# Patient Record
Sex: FEMALE | Race: WHITE | NOT HISPANIC OR LATINO | Employment: PART TIME | URBAN - METROPOLITAN AREA
[De-identification: names, ages, dates, MRNs, and addresses within clinical notes are randomized per-mention and may not be internally consistent; named-entity substitution may affect disease eponyms.]

---

## 2019-06-11 ENCOUNTER — OFFICE VISIT CONVERTED (OUTPATIENT)
Dept: OTHER | Facility: HOSPITAL | Age: 69
End: 2019-06-11
Attending: NURSE PRACTITIONER

## 2019-06-11 ENCOUNTER — CONVERSION ENCOUNTER (OUTPATIENT)
Dept: OTHER | Facility: HOSPITAL | Age: 69
End: 2019-06-11

## 2019-06-11 ENCOUNTER — HOSPITAL ENCOUNTER (OUTPATIENT)
Dept: OTHER | Facility: HOSPITAL | Age: 69
Discharge: HOME | End: 2019-06-11
Attending: NURSE PRACTITIONER

## 2019-06-11 LAB
ALBUMIN SERPL-MCNC: 4 G/DL (ref 3.5–5)
ALBUMIN/GLOB SERPL: 1 {RATIO} (ref 1.4–2.6)
ALP SERPL-CCNC: 106 U/L (ref 43–160)
ALT SERPL-CCNC: 30 U/L (ref 10–40)
ANION GAP SERPL CALC-SCNC: 19 MMOL/L (ref 8–19)
AST SERPL-CCNC: 32 U/L (ref 15–50)
BASOPHILS # BLD AUTO: 0.04 10*3/UL (ref 0–0.2)
BASOPHILS NFR BLD AUTO: 0.7 % (ref 0–3)
BILIRUB SERPL-MCNC: 0.34 MG/DL (ref 0.2–1.3)
BUN SERPL-MCNC: 20 MG/DL (ref 5–25)
BUN/CREAT SERPL: 22 {RATIO} (ref 6–20)
CALCIUM SERPL-MCNC: 9.1 MG/DL (ref 8.7–10.4)
CHLORIDE SERPL-SCNC: 104 MMOL/L (ref 99–111)
CHOLEST SERPL-MCNC: 167 MG/DL (ref 107–200)
CHOLEST/HDLC SERPL: 3.3 {RATIO} (ref 3–6)
CONV ABS IMM GRAN: 0.01 10*3/UL (ref 0–0.2)
CONV CO2: 21 MMOL/L (ref 22–32)
CONV CREATININE URINE, RANDOM: 171.6 MG/DL (ref 10–300)
CONV IMMATURE GRAN: 0.2 % (ref 0–1.8)
CONV MICROALBUM.,U,RANDOM: 26.6 MG/L (ref 0–20)
CONV TOTAL PROTEIN: 8 G/DL (ref 6.3–8.2)
CREAT UR-MCNC: 0.92 MG/DL (ref 0.5–0.9)
DEPRECATED RDW RBC AUTO: 45.9 FL (ref 36.4–46.3)
EOSINOPHIL # BLD AUTO: 0.08 10*3/UL (ref 0–0.7)
EOSINOPHIL # BLD AUTO: 1.3 % (ref 0–7)
ERYTHROCYTE [DISTWIDTH] IN BLOOD BY AUTOMATED COUNT: 12.8 % (ref 11.7–14.4)
EST. AVERAGE GLUCOSE BLD GHB EST-MCNC: 186 MG/DL
GFR SERPLBLD BASED ON 1.73 SQ M-ARVRAT: >60 ML/MIN/{1.73_M2}
GLOBULIN UR ELPH-MCNC: 4 G/DL (ref 2–3.5)
GLUCOSE SERPL-MCNC: 169 MG/DL (ref 65–99)
HBA1C MFR BLD: 14.8 G/DL (ref 12–16)
HBA1C MFR BLD: 8.1 % (ref 3.5–5.7)
HCT VFR BLD AUTO: 45.8 % (ref 37–47)
HDLC SERPL-MCNC: 50 MG/DL (ref 40–60)
LDLC SERPL CALC-MCNC: 90 MG/DL (ref 70–100)
LYMPHOCYTES # BLD AUTO: 2.3 10*3/UL (ref 1–5)
MCH RBC QN AUTO: 31.2 PG (ref 27–31)
MCHC RBC AUTO-ENTMCNC: 32.3 G/DL (ref 33–37)
MCV RBC AUTO: 96.6 FL (ref 81–99)
MICROALBUMIN/CREAT UR: 15.5 MG/G{CRE} (ref 0–35)
MONOCYTES # BLD AUTO: 0.39 10*3/UL (ref 0.2–1.2)
MONOCYTES NFR BLD AUTO: 6.4 % (ref 3–10)
NEUTROPHILS # BLD AUTO: 3.23 10*3/UL (ref 2–8)
NEUTROPHILS NFR BLD AUTO: 53.4 % (ref 30–85)
NRBC CBCN: 0 % (ref 0–0.7)
OSMOLALITY SERPL CALC.SUM OF ELEC: 295 MOSM/KG (ref 273–304)
PLATELET # BLD AUTO: 177 10*3/UL (ref 130–400)
PMV BLD AUTO: 12.2 FL (ref 9.4–12.3)
POTASSIUM SERPL-SCNC: 4.6 MMOL/L (ref 3.5–5.3)
RBC # BLD AUTO: 4.74 10*6/UL (ref 4.2–5.4)
SODIUM SERPL-SCNC: 139 MMOL/L (ref 135–147)
T4 FREE SERPL-MCNC: 1.2 NG/DL (ref 0.9–1.8)
TRIGL SERPL-MCNC: 134 MG/DL (ref 40–150)
TSH SERPL-ACNC: 2.56 M[IU]/L (ref 0.27–4.2)
VARIANT LYMPHS NFR BLD MANUAL: 38 % (ref 20–45)
VLDLC SERPL-MCNC: 27 MG/DL (ref 5–37)
WBC # BLD AUTO: 6.05 10*3/UL (ref 4.8–10.8)

## 2019-09-10 ENCOUNTER — OFFICE VISIT CONVERTED (OUTPATIENT)
Dept: OTHER | Facility: HOSPITAL | Age: 69
End: 2019-09-10
Attending: NURSE PRACTITIONER

## 2019-09-10 ENCOUNTER — HOSPITAL ENCOUNTER (OUTPATIENT)
Dept: OTHER | Facility: HOSPITAL | Age: 69
Discharge: HOME | End: 2019-09-10
Attending: NURSE PRACTITIONER

## 2019-09-10 ENCOUNTER — CONVERSION ENCOUNTER (OUTPATIENT)
Dept: OTHER | Facility: HOSPITAL | Age: 69
End: 2019-09-10

## 2019-09-10 LAB
ALBUMIN SERPL-MCNC: 3.8 G/DL (ref 3.5–5)
ALBUMIN/GLOB SERPL: 1.1 {RATIO} (ref 1.4–2.6)
ALP SERPL-CCNC: 94 U/L (ref 43–160)
ALT SERPL-CCNC: 29 U/L (ref 10–40)
ANION GAP SERPL CALC-SCNC: 19 MMOL/L (ref 8–19)
AST SERPL-CCNC: 31 U/L (ref 15–50)
BILIRUB SERPL-MCNC: 0.37 MG/DL (ref 0.2–1.3)
BUN SERPL-MCNC: 15 MG/DL (ref 5–25)
BUN/CREAT SERPL: 19 {RATIO} (ref 6–20)
CALCIUM SERPL-MCNC: 9.2 MG/DL (ref 8.7–10.4)
CHLORIDE SERPL-SCNC: 105 MMOL/L (ref 99–111)
CONV CO2: 21 MMOL/L (ref 22–32)
CONV TOTAL PROTEIN: 7.3 G/DL (ref 6.3–8.2)
CREAT UR-MCNC: 0.79 MG/DL (ref 0.5–0.9)
EST. AVERAGE GLUCOSE BLD GHB EST-MCNC: 194 MG/DL
GFR SERPLBLD BASED ON 1.73 SQ M-ARVRAT: >60 ML/MIN/{1.73_M2}
GLOBULIN UR ELPH-MCNC: 3.5 G/DL (ref 2–3.5)
GLUCOSE SERPL-MCNC: 177 MG/DL (ref 65–99)
HBA1C MFR BLD: 8.4 % (ref 3.5–5.7)
OSMOLALITY SERPL CALC.SUM OF ELEC: 295 MOSM/KG (ref 273–304)
POTASSIUM SERPL-SCNC: 4.7 MMOL/L (ref 3.5–5.3)
SODIUM SERPL-SCNC: 140 MMOL/L (ref 135–147)

## 2019-12-10 ENCOUNTER — OFFICE VISIT CONVERTED (OUTPATIENT)
Dept: OTHER | Facility: HOSPITAL | Age: 69
End: 2019-12-10
Attending: NURSE PRACTITIONER

## 2019-12-10 ENCOUNTER — HOSPITAL ENCOUNTER (OUTPATIENT)
Dept: OTHER | Facility: HOSPITAL | Age: 69
Discharge: HOME | End: 2019-12-10
Attending: NURSE PRACTITIONER

## 2019-12-10 LAB
ALBUMIN SERPL-MCNC: 3.6 G/DL (ref 3.5–5)
ALBUMIN/GLOB SERPL: 0.9 {RATIO} (ref 1.4–2.6)
ALP SERPL-CCNC: 105 U/L (ref 43–160)
ALT SERPL-CCNC: 34 U/L (ref 10–40)
ANION GAP SERPL CALC-SCNC: 18 MMOL/L (ref 8–19)
AST SERPL-CCNC: 33 U/L (ref 15–50)
BILIRUB SERPL-MCNC: 0.34 MG/DL (ref 0.2–1.3)
BUN SERPL-MCNC: 18 MG/DL (ref 5–25)
BUN/CREAT SERPL: 20 {RATIO} (ref 6–20)
CALCIUM SERPL-MCNC: 9.3 MG/DL (ref 8.7–10.4)
CHLORIDE SERPL-SCNC: 105 MMOL/L (ref 99–111)
CHOLEST SERPL-MCNC: 160 MG/DL (ref 107–200)
CHOLEST/HDLC SERPL: 3.6 {RATIO} (ref 3–6)
CONV CO2: 21 MMOL/L (ref 22–32)
CONV CREATININE URINE, RANDOM: 158.1 MG/DL (ref 10–300)
CONV MICROALBUM.,U,RANDOM: 12.2 MG/L (ref 0–20)
CONV TOTAL PROTEIN: 7.4 G/DL (ref 6.3–8.2)
CREAT UR-MCNC: 0.89 MG/DL (ref 0.5–0.9)
EST. AVERAGE GLUCOSE BLD GHB EST-MCNC: 189 MG/DL
GFR SERPLBLD BASED ON 1.73 SQ M-ARVRAT: >60 ML/MIN/{1.73_M2}
GLOBULIN UR ELPH-MCNC: 3.8 G/DL (ref 2–3.5)
GLUCOSE SERPL-MCNC: 181 MG/DL (ref 65–99)
HBA1C MFR BLD: 8.2 % (ref 3.5–5.7)
HDLC SERPL-MCNC: 45 MG/DL (ref 40–60)
LDLC SERPL CALC-MCNC: 93 MG/DL (ref 70–100)
MICROALBUMIN/CREAT UR: 7.7 MG/G{CRE} (ref 0–35)
OSMOLALITY SERPL CALC.SUM OF ELEC: 296 MOSM/KG (ref 273–304)
POTASSIUM SERPL-SCNC: 4.2 MMOL/L (ref 3.5–5.3)
SODIUM SERPL-SCNC: 140 MMOL/L (ref 135–147)
TRIGL SERPL-MCNC: 112 MG/DL (ref 40–150)
VLDLC SERPL-MCNC: 22 MG/DL (ref 5–37)

## 2020-03-10 ENCOUNTER — CONVERSION ENCOUNTER (OUTPATIENT)
Dept: FAMILY MEDICINE CLINIC | Facility: CLINIC | Age: 70
End: 2020-03-10

## 2020-03-10 ENCOUNTER — HOSPITAL ENCOUNTER (OUTPATIENT)
Dept: FAMILY MEDICINE CLINIC | Facility: CLINIC | Age: 70
Discharge: HOME | End: 2020-03-10
Attending: NURSE PRACTITIONER

## 2020-03-10 ENCOUNTER — OFFICE VISIT CONVERTED (OUTPATIENT)
Dept: FAMILY MEDICINE CLINIC | Facility: CLINIC | Age: 70
End: 2020-03-10
Attending: NURSE PRACTITIONER

## 2020-03-10 LAB
ALBUMIN SERPL-MCNC: 3.9 G/DL (ref 3.5–5)
ALBUMIN/GLOB SERPL: 1.1 {RATIO} (ref 1.4–2.6)
ALP SERPL-CCNC: 84 U/L (ref 43–160)
ALT SERPL-CCNC: 32 U/L (ref 10–40)
ANION GAP SERPL CALC-SCNC: 16 MMOL/L (ref 8–19)
AST SERPL-CCNC: 30 U/L (ref 15–50)
BILIRUB SERPL-MCNC: 0.38 MG/DL (ref 0.2–1.3)
BUN SERPL-MCNC: 19 MG/DL (ref 5–25)
BUN/CREAT SERPL: 23 {RATIO} (ref 6–20)
CALCIUM SERPL-MCNC: 9.2 MG/DL (ref 8.7–10.4)
CHLORIDE SERPL-SCNC: 104 MMOL/L (ref 99–111)
CHOLEST SERPL-MCNC: 171 MG/DL (ref 107–200)
CHOLEST/HDLC SERPL: 3.9 {RATIO} (ref 3–6)
CONV CO2: 23 MMOL/L (ref 22–32)
CONV TOTAL PROTEIN: 7.4 G/DL (ref 6.3–8.2)
CREAT UR-MCNC: 0.84 MG/DL (ref 0.5–0.9)
EST. AVERAGE GLUCOSE BLD GHB EST-MCNC: 192 MG/DL
GFR SERPLBLD BASED ON 1.73 SQ M-ARVRAT: >60 ML/MIN/{1.73_M2}
GLOBULIN UR ELPH-MCNC: 3.5 G/DL (ref 2–3.5)
GLUCOSE SERPL-MCNC: 167 MG/DL (ref 65–99)
HBA1C MFR BLD: 8.3 % (ref 3.5–5.7)
HDLC SERPL-MCNC: 44 MG/DL (ref 40–60)
LDLC SERPL CALC-MCNC: 98 MG/DL (ref 70–100)
OSMOLALITY SERPL CALC.SUM OF ELEC: 292 MOSM/KG (ref 273–304)
POTASSIUM SERPL-SCNC: 4.5 MMOL/L (ref 3.5–5.3)
SODIUM SERPL-SCNC: 138 MMOL/L (ref 135–147)
TRIGL SERPL-MCNC: 144 MG/DL (ref 40–150)
VLDLC SERPL-MCNC: 29 MG/DL (ref 5–37)

## 2020-06-10 ENCOUNTER — CONVERSION ENCOUNTER (OUTPATIENT)
Dept: FAMILY MEDICINE CLINIC | Facility: CLINIC | Age: 70
End: 2020-06-10

## 2020-06-10 ENCOUNTER — OFFICE VISIT CONVERTED (OUTPATIENT)
Dept: FAMILY MEDICINE CLINIC | Facility: CLINIC | Age: 70
End: 2020-06-10
Attending: NURSE PRACTITIONER

## 2020-06-10 ENCOUNTER — HOSPITAL ENCOUNTER (OUTPATIENT)
Dept: FAMILY MEDICINE CLINIC | Facility: CLINIC | Age: 70
Discharge: HOME | End: 2020-06-10
Attending: NURSE PRACTITIONER

## 2020-06-10 LAB
ALBUMIN SERPL-MCNC: 3.9 G/DL (ref 3.5–5)
ALBUMIN/GLOB SERPL: 1.1 {RATIO} (ref 1.4–2.6)
ALP SERPL-CCNC: 91 U/L (ref 43–160)
ALT SERPL-CCNC: 33 U/L (ref 10–40)
ANION GAP SERPL CALC-SCNC: 15 MMOL/L (ref 8–19)
AST SERPL-CCNC: 38 U/L (ref 15–50)
BASOPHILS # BLD AUTO: 0.04 10*3/UL (ref 0–0.2)
BASOPHILS NFR BLD AUTO: 0.7 % (ref 0–3)
BILIRUB SERPL-MCNC: 0.4 MG/DL (ref 0.2–1.3)
BUN SERPL-MCNC: 17 MG/DL (ref 5–25)
BUN/CREAT SERPL: 20 {RATIO} (ref 6–20)
CALCIUM SERPL-MCNC: 9.1 MG/DL (ref 8.7–10.4)
CHLORIDE SERPL-SCNC: 104 MMOL/L (ref 99–111)
CHOLEST SERPL-MCNC: 166 MG/DL (ref 107–200)
CHOLEST/HDLC SERPL: 4 {RATIO} (ref 3–6)
CONV ABS IMM GRAN: 0.02 10*3/UL (ref 0–0.2)
CONV CO2: 22 MMOL/L (ref 22–32)
CONV CREATININE URINE, RANDOM: 180.9 MG/DL (ref 10–300)
CONV IMMATURE GRAN: 0.3 % (ref 0–1.8)
CONV MICROALBUM.,U,RANDOM: <12 MG/L (ref 0–20)
CONV TOTAL PROTEIN: 7.6 G/DL (ref 6.3–8.2)
CREAT UR-MCNC: 0.83 MG/DL (ref 0.5–0.9)
DEPRECATED RDW RBC AUTO: 46.4 FL (ref 36.4–46.3)
EOSINOPHIL # BLD AUTO: 0.11 10*3/UL (ref 0–0.7)
EOSINOPHIL # BLD AUTO: 1.8 % (ref 0–7)
ERYTHROCYTE [DISTWIDTH] IN BLOOD BY AUTOMATED COUNT: 12.7 % (ref 11.7–14.4)
EST. AVERAGE GLUCOSE BLD GHB EST-MCNC: 197 MG/DL
FOLATE SERPL-MCNC: 13.2 NG/ML (ref 4.8–20)
GFR SERPLBLD BASED ON 1.73 SQ M-ARVRAT: >60 ML/MIN/{1.73_M2}
GLOBULIN UR ELPH-MCNC: 3.7 G/DL (ref 2–3.5)
GLUCOSE SERPL-MCNC: 150 MG/DL (ref 65–99)
HBA1C MFR BLD: 8.5 % (ref 3.5–5.7)
HCT VFR BLD AUTO: 46.5 % (ref 37–47)
HDLC SERPL-MCNC: 42 MG/DL (ref 40–60)
HGB BLD-MCNC: 15.2 G/DL (ref 12–16)
LDLC SERPL CALC-MCNC: 96 MG/DL (ref 70–100)
LYMPHOCYTES # BLD AUTO: 2.39 10*3/UL (ref 1–5)
LYMPHOCYTES NFR BLD AUTO: 40.1 % (ref 20–45)
MCH RBC QN AUTO: 31.7 PG (ref 27–31)
MCHC RBC AUTO-ENTMCNC: 32.7 G/DL (ref 33–37)
MCV RBC AUTO: 96.9 FL (ref 81–99)
MICROALBUMIN/CREAT UR: 6.6 MG/G{CRE} (ref 0–35)
MONOCYTES # BLD AUTO: 0.39 10*3/UL (ref 0.2–1.2)
MONOCYTES NFR BLD AUTO: 6.5 % (ref 3–10)
NEUTROPHILS # BLD AUTO: 3.01 10*3/UL (ref 2–8)
NEUTROPHILS NFR BLD AUTO: 50.6 % (ref 30–85)
NRBC CBCN: 0 % (ref 0–0.7)
OSMOLALITY SERPL CALC.SUM OF ELEC: 286 MOSM/KG (ref 273–304)
PLATELET # BLD AUTO: 187 10*3/UL (ref 130–400)
PMV BLD AUTO: 11.8 FL (ref 9.4–12.3)
POTASSIUM SERPL-SCNC: 4.7 MMOL/L (ref 3.5–5.3)
RBC # BLD AUTO: 4.8 10*6/UL (ref 4.2–5.4)
SODIUM SERPL-SCNC: 136 MMOL/L (ref 135–147)
T4 FREE SERPL-MCNC: 1.1 NG/DL (ref 0.9–1.8)
TRIGL SERPL-MCNC: 141 MG/DL (ref 40–150)
TSH SERPL-ACNC: 1.7 M[IU]/L (ref 0.27–4.2)
VIT B12 SERPL-MCNC: 362 PG/ML (ref 211–911)
VLDLC SERPL-MCNC: 28 MG/DL (ref 5–37)
WBC # BLD AUTO: 5.96 10*3/UL (ref 4.8–10.8)

## 2020-09-09 ENCOUNTER — CONVERSION ENCOUNTER (OUTPATIENT)
Dept: FAMILY MEDICINE CLINIC | Facility: CLINIC | Age: 70
End: 2020-09-09

## 2020-09-09 ENCOUNTER — OFFICE VISIT CONVERTED (OUTPATIENT)
Dept: FAMILY MEDICINE CLINIC | Facility: CLINIC | Age: 70
End: 2020-09-09
Attending: NURSE PRACTITIONER

## 2020-09-09 ENCOUNTER — HOSPITAL ENCOUNTER (OUTPATIENT)
Dept: FAMILY MEDICINE CLINIC | Facility: CLINIC | Age: 70
Discharge: HOME | End: 2020-09-09
Attending: NURSE PRACTITIONER

## 2020-09-10 LAB
25(OH)D3 SERPL-MCNC: 12.8 NG/ML (ref 30–100)
ALBUMIN SERPL-MCNC: 3.8 G/DL (ref 3.5–5)
ALBUMIN/GLOB SERPL: 1 {RATIO} (ref 1.4–2.6)
ALP SERPL-CCNC: 94 U/L (ref 43–160)
ALT SERPL-CCNC: 28 U/L (ref 10–40)
ANION GAP SERPL CALC-SCNC: 16 MMOL/L (ref 8–19)
AST SERPL-CCNC: 34 U/L (ref 15–50)
BILIRUB SERPL-MCNC: 0.39 MG/DL (ref 0.2–1.3)
BUN SERPL-MCNC: 16 MG/DL (ref 5–25)
BUN/CREAT SERPL: 16 {RATIO} (ref 6–20)
CALCIUM SERPL-MCNC: 9.3 MG/DL (ref 8.7–10.4)
CHLORIDE SERPL-SCNC: 105 MMOL/L (ref 99–111)
CHOLEST SERPL-MCNC: 170 MG/DL (ref 107–200)
CHOLEST/HDLC SERPL: 4 {RATIO} (ref 3–6)
CONV CO2: 23 MMOL/L (ref 22–32)
CONV TOTAL PROTEIN: 7.5 G/DL (ref 6.3–8.2)
CREAT UR-MCNC: 0.97 MG/DL (ref 0.5–0.9)
EST. AVERAGE GLUCOSE BLD GHB EST-MCNC: 189 MG/DL
FOLATE SERPL-MCNC: 11.4 NG/ML (ref 4.8–20)
GFR SERPLBLD BASED ON 1.73 SQ M-ARVRAT: 58 ML/MIN/{1.73_M2}
GLOBULIN UR ELPH-MCNC: 3.7 G/DL (ref 2–3.5)
GLUCOSE SERPL-MCNC: 142 MG/DL (ref 65–99)
HBA1C MFR BLD: 8.2 % (ref 3.5–5.7)
HDLC SERPL-MCNC: 42 MG/DL (ref 40–60)
LDLC SERPL CALC-MCNC: 102 MG/DL (ref 70–100)
OSMOLALITY SERPL CALC.SUM OF ELEC: 292 MOSM/KG (ref 273–304)
POTASSIUM SERPL-SCNC: 4.6 MMOL/L (ref 3.5–5.3)
SODIUM SERPL-SCNC: 139 MMOL/L (ref 135–147)
TRIGL SERPL-MCNC: 130 MG/DL (ref 40–150)
VIT B12 SERPL-MCNC: 842 PG/ML (ref 211–911)
VLDLC SERPL-MCNC: 26 MG/DL (ref 5–37)

## 2020-09-25 ENCOUNTER — OFFICE VISIT CONVERTED (OUTPATIENT)
Dept: SURGERY | Facility: CLINIC | Age: 70
End: 2020-09-25
Attending: NURSE PRACTITIONER

## 2020-12-09 ENCOUNTER — HOSPITAL ENCOUNTER (OUTPATIENT)
Dept: MAMMOGRAPHY | Facility: HOSPITAL | Age: 70
Discharge: HOME | End: 2020-12-09
Attending: NURSE PRACTITIONER

## 2020-12-09 ENCOUNTER — TELEMEDICINE CONVERTED (OUTPATIENT)
Dept: FAMILY MEDICINE CLINIC | Facility: CLINIC | Age: 70
End: 2020-12-09
Attending: NURSE PRACTITIONER

## 2021-03-02 ENCOUNTER — HOSPITAL ENCOUNTER (OUTPATIENT)
Dept: FAMILY MEDICINE CLINIC | Facility: CLINIC | Age: 71
Discharge: HOME | End: 2021-03-02
Attending: NURSE PRACTITIONER

## 2021-03-02 ENCOUNTER — OFFICE VISIT CONVERTED (OUTPATIENT)
Dept: FAMILY MEDICINE CLINIC | Facility: CLINIC | Age: 71
End: 2021-03-02
Attending: NURSE PRACTITIONER

## 2021-03-02 ENCOUNTER — CONVERSION ENCOUNTER (OUTPATIENT)
Dept: FAMILY MEDICINE CLINIC | Facility: CLINIC | Age: 71
End: 2021-03-02

## 2021-03-02 LAB
ALBUMIN SERPL-MCNC: 3.7 G/DL (ref 3.5–5)
ALBUMIN/GLOB SERPL: 1.1 {RATIO} (ref 1.4–2.6)
ALP SERPL-CCNC: 82 U/L (ref 43–160)
ALT SERPL-CCNC: 26 U/L (ref 10–40)
ANION GAP SERPL CALC-SCNC: 15 MMOL/L (ref 8–19)
AST SERPL-CCNC: 30 U/L (ref 15–50)
BILIRUB SERPL-MCNC: 0.38 MG/DL (ref 0.2–1.3)
BUN SERPL-MCNC: 17 MG/DL (ref 5–25)
BUN/CREAT SERPL: 15 {RATIO} (ref 6–20)
CALCIUM SERPL-MCNC: 8.9 MG/DL (ref 8.7–10.4)
CHLORIDE SERPL-SCNC: 103 MMOL/L (ref 99–111)
CHOLEST SERPL-MCNC: 113 MG/DL (ref 107–200)
CHOLEST/HDLC SERPL: 2.5 {RATIO} (ref 3–6)
CONV CO2: 23 MMOL/L (ref 22–32)
CONV TOTAL PROTEIN: 7 G/DL (ref 6.3–8.2)
CREAT UR-MCNC: 1.14 MG/DL (ref 0.5–0.9)
EST. AVERAGE GLUCOSE BLD GHB EST-MCNC: 212 MG/DL
GFR SERPLBLD BASED ON 1.73 SQ M-ARVRAT: 48 ML/MIN/{1.73_M2}
GLOBULIN UR ELPH-MCNC: 3.3 G/DL (ref 2–3.5)
GLUCOSE SERPL-MCNC: 236 MG/DL (ref 65–99)
HBA1C MFR BLD: 9 % (ref 3.5–5.7)
HDLC SERPL-MCNC: 45 MG/DL (ref 40–60)
LDLC SERPL CALC-MCNC: 41 MG/DL (ref 70–100)
OSMOLALITY SERPL CALC.SUM OF ELEC: 293 MOSM/KG (ref 273–304)
POTASSIUM SERPL-SCNC: 4.1 MMOL/L (ref 3.5–5.3)
SODIUM SERPL-SCNC: 137 MMOL/L (ref 135–147)
TRIGL SERPL-MCNC: 133 MG/DL (ref 40–150)
VLDLC SERPL-MCNC: 27 MG/DL (ref 5–37)

## 2021-03-03 LAB — 25(OH)D3 SERPL-MCNC: 22.9 NG/ML (ref 30–100)

## 2021-05-10 NOTE — H&P
History and Physical      Patient Name: Jane Huang   Patient ID: [id number]   Sex: Female   YOB: 1950    Primary Care Provider: Chrissy HOOKER   Referring Provider: Chrissy HOOKER    Visit Date: 2020    Provider: NHUNG Jain   Location: [facility]   Location Address: [address] [place] [address]  [id number]   Location Phone: [phone]          Chief Complaint  · Requesting colonoscopy  · Age 50 or over  · FH of colon cancer  · Here today for a pre-surgical colon screening visit     Hx of sigmoid resection       History Of Present Illness  The patient is a 70 year old /White female presenting to the Surgical Specialist office on a referral from Chrissy HOOKER.   Jane Huang needs to have a screening colonoscopy.   Patient states that they have had a colonoscopy. 7 years ago   Patient currently complains of: no complaints and   Patient Does have family history of colon cancer. Sibling      Patient presents today without complaints for screening colonoscopy.  She denies any abdominal pain, change in bowel habit or rectal bleeding.  She admits to family history of colon cancer with her sister that is  related to colon cancer.    Patient had a sigmoid colon resection on 3/06 related to a sigmoid colon stricture.    Patient has stage II CKD. Miralax prep.    3/13: Colonoscopy (Pankaj): Diverticulosis.       Past Medical History  Disease Name Date Onset Notes   Allergic rhinitis, chronic --  --    Allergies --  --    Arthritis --  --    Arthritis --  --    Broken Bones --  --    Cataracts, bilateral --  --    Colitis --  --    Depression 2020 --    Diabetes --  --    Diabetes mellitus, type 2 2019 --    Diverticulitis --  --    Essential hypertension 2019 --    Essential tremor 2019 --    Forgetfulness --  --    Gall stones --  --    GERD --  --    High blood pressure --  --   "  HTN (hypertension), benign --  --    Insomnia, unspecified 09/09/2020 --    Kidney stones --  --    Reflux --  --    Screening breast examination --  --    Screening for colon cancer 3-18-13 --    Sinus trouble --  --    Vitamin B12 deficiency 09/09/2020 --          Past Surgical History  Procedure Name Date Notes   *Metal Implant --  --    Appendectomy --  --    Cholecstectomy --  --    Colon --  --    Colon Resection --  --    Colonoscopy 3-18-13 --    Gallbladder --  --    Hysterectomy --  --    Hysterectomy-Abdominal --  --    Joint Surgery --  --    Rotator Cuff repair --  --          Medication List  Name Date Started Instructions   amlodipine 5 mg oral tablet 09/09/2020 take 1 tablet (5 mg) by oral route once daily for 90 days   atorvastatin 10 mg oral tablet 09/10/2020 take 1 tablet (10 mg) by oral route once daily at bedtime for 30 days   baclofen 10 mg oral tablet  take 1 tablet by oral route daily   BD Ultra-Fine Short Pen Needle 31 gauge x 5/16\" miscellaneous needle 12/10/2019 use as directed for 90 days   benazepril 40 mg oral tablet 09/09/2020 take 1 tablet (40 mg) by oral route once daily for 90 days   cetirizine 10 mg oral tablet  take 1 tablet (10 mg) by oral route once daily   colestipol 1 gram oral tablet 06/10/2020 take 1 tablet (1 gram) by oral route 2 times per day swallowing whole with any liquid. Do not crush, chew and/or divide. for 90 days   gabapentin 300 mg oral capsule  take 1 capsule by oral route 2 times a day   glipizide 5 mg oral tablet 09/09/2020 take 2 tablet (5 mg) by oral route in the morning and take 1 tablet in the evening   hydrocodone-acetaminophen  mg oral tablet  take 1 tablet by oral route every 12 hours as needed for pain   Lantus Solostar U-100 Insulin 100 unit/mL (3 mL) subcutaneous insulin pen 06/10/2020 inject 65 units by subcutaneous route once daily   trazodone 50 mg oral tablet 09/09/2020 take 1/2 to 1 tablet by oral route once daily at bedtime for 30 days " "  Vitamin B-12 1,000 mcg oral tablet 09/09/2020 take 1 tablet by oral route daily for 90 days   Vitamin D2 1,250 mcg (50,000 unit) oral capsule 09/10/2020 take 1 capsule by oral route once a week for 90 days         Allergy List  Allergen Name Date Reaction Notes   PENICILLINS --  --  --        Allergies Reconciled  Family Medical History  Disease Name Relative/Age Notes   Chronic Obstructive Pulmonary Disease  --    Breast Neoplasm, Malignant  --    Stomach Neoplasm, Malignant  --    Stroke  --    Heart Disease  --    Cancer, Unspecified  thyroid   Diabetes, unspecified type Brother/  Mother/  Sister/   Mother; Brother; Sister; Child   Prostate cancer Brother/   Brother   Renal Calculus  Child   Family history of colon cancer Sister/70s   Sister/70s   Diabetes  --          Social History  Finding Status Start/Stop Quantity Notes   Alcohol Never --/-- --  09/25/2020 -    Recreational Drug Use Never --/-- --  --    Tobacco Never --/-- --  --          Review of Systems  · Constitutional  o Denies  o : fever, chills  · Eyes  o Denies  o : yellowish discoloration of eyes  · HENT  o Denies  o : difficulty swallowing  · Cardiovascular  o Denies  o : chest pain, chest pain on exertion  · Respiratory  o Denies  o : shortness of breath  · Gastrointestinal  o Denies  o : nausea, vomiting, diarrhea, constipation  · Genitourinary  o Denies  o : abnormal color of urine  · Integument  o Denies  o : rash  · Neurologic  o Denies  o : tingling or numbness  · Musculoskeletal  o Denies  o : joint pain  · Endocrine  o Denies  o : weight gain, weight loss      Vitals  Date Time BP Position Site L\R Cuff Size HR RR TEMP (F) WT  HT  BMI kg/m2 BSA m2 O2 Sat FR L/min FiO2 HC       09/25/2020 10:17 AM         237lbs 8oz 5'  3\" 42.07 2.19             Physical Examination  · Constitutional  o Appearance  o : well developed, well-nourished, patient in no apparent distress  · Head and Face  o Head  o :   § Inspection  § : atraumatic, " normocephalic  o Face  o :   § Inspection  § : no facial lesions  · Eyes  o Conjunctivae  o : conjunctivae normal  o Sclerae  o : sclerae white  · Neck  o Inspection/Palpation  o : normal appearance, no masses or tenderness, trachea midline  · Respiratory  o Respiratory Effort  o : breathing unlabored  · Skin and Subcutaneous Tissue  o General Inspection  o : no lesions present, no areas of discoloration, skin turgor normal, texture normal  · Neurologic  o Mental Status Examination  o :   § Orientation  § : grossly oriented to person, place and time  § Attention  § : attention normal, concentration abilities normal  § Fund of Knowledge  § : fund of knowledge within normal limits, patient aware of current events  o Gait and Station  o : normal gait, able to stand without difficulty  · Psychiatric  o Judgement and Insight  o : judgment and insight intact  o Mood and Affect  o : mood normal, affect appropriate              Assessment  · Family History of Colon Cancer     V16.0/Z80.0  · Screening for colon cancer     V76.51/Z12.11  · Pre-op testing     V72.84/Z01.818  · History of colon resection     V45.89/Z90.49    Problems Reconciled  Plan  · Orders  o Consent for Colonoscopy Screening-Possible risk/complications, benefits, and alternatives to surgical or invasive procedure have been explained to patient and/or legal guardian. -Patient has been evaluated and can tolerate anesthesia and/or sedation. Risks, benefits, and alternatives to anesthesia and sedation have been explained to patient and/or legal guardian. () - V72.84/Z01.818, V76.51/Z12.11, V45.89/Z90.49, V16.0/Z80.0 - 01/08/2021  o [facility] Pre-Op Covid-19 Screening ([address]) - V72.84/Z01.818, V76.51/Z12.11, V45.89/Z90.49, V16.0/Z80.0 - 01/03/2021   [address] @ 8:00am  · Medications  o Medications have been Reconciled  o Transition of Care or Provider Policy  · Instructions  o Surgical Facility: [facility]  o Handouts Provided  Pre-Procedure Instructions including date, time, and location of procedure.   o PLAN: Proceeed with colonoscopy. Patient understands risks/benefits and is willing to proceed.   o ***Surgical Orders***  o RISK AND BENEFITS:  o Given these options, the patient has verbally expressed an understanding of the risks of the surgery and finds these risks acceptable. Will proceed with surgery as soon as possible.  o O.R. PREP: Per protocol   o IV: Per Anesthesia  o Please sign permit for: Colonoscopy with possible biopsies by Dr. Lira.  o The above History and Physical Examination has been completed within 30 days of admission.  o ***Patient Status***  o Outpatient  o Follow up in the in the office post procedure.  o Advised patient she would need COVID-19 testing prior to procedure. Encourage patient self isolate in between testing and procedure. Patient verbalizes understands willing to proceed.  o Electronically Identified Patient Education Materials Provided Electronically            Electronically Signed by: NHUNG Jain -Author on October 7, 2020 01:50:57 PM

## 2021-05-13 NOTE — PROGRESS NOTES
Progress Note      Patient Name: Jane Huang   Patient ID: [id number]   Sex: Female   YOB: 1950    Primary Care Provider: Chrissy HOOKER    Visit Date: September 9, 2020    Provider: NHUNG Toledo   Location: [facility]   Location Address: [address] [place] [address], [address] [place] [address]  [id number]   Location Phone: [phone]          Chief Complaint  · 3 month follow up      History Of Present Illness  Jane Huang is a 70 year old /White female who presents for evaluation and treatment of:      3-month follow-up and med refills.    Diabetes type 2, insulin-dependent: Her last A1c was 8.5% and we increased her glipizide dose at that time.  She is currently on Lantus 65 units daily and glipizide 5 mg 2 tablets in the morning and 1 tablet in the evening.  She states her blood sugars have improved since the increase in glipizide dose but she states her blood sugar was 182 this morning.    Hypertension: Blood pressure is stable 126/80 on benazepril 40 mg and amlodipine 5 mg daily.    Vitamin B12 deficiency: Her vitamin B12 level was low normal previously was 362.  She was started on vitamin B12 oral tabs.  She states she does feel some better.    PHQ 9 score 16 today, she admits that she is somewhat depressed.  She complains of difficulty falling asleep at night.  She states she has difficulty shutting her mind off sometimes when she goes to bed.  She states her depression is mostly due to being isolated due to the COVID-19 pandemic.  No suicidal or homicidal ideation.    She is due for mammogram and does not think she is ever had a bone density scan.    She states that [facility] has been calling her and telling her she needs to have a colorectal screening.  Her last one was in 2013.  She does have a family history of colon cancer.       Past Medical History  Disease Name Date Onset Notes   Allergies --  --    Arthritis --  --    Broken Bones  "--  --    Cataracts, bilateral --  --    Colitis --  --    Depression 09/09/2020 --    Diabetes --  --    Diabetes mellitus, type 2 06/11/2019 --    Diverticulitis --  --    Essential hypertension 06/11/2019 --    Essential tremor 06/11/2019 --    Forgetfulness --  --    Gall stones --  --    HTN (hypertension), benign --  --    Insomnia, unspecified 09/09/2020 --    Kidney stones --  --    Reflux --  --    Screening breast examination --  --    Screening for colon cancer 3-18-13 --    Sinus trouble --  --    Vitamin B12 deficiency 09/09/2020 --          Past Surgical History  Procedure Name Date Notes   Cholecstectomy --  --    Colon Resection --  --    Colonoscopy 3-18-13 --    Hysterectomy --  --    Joint Surgery --  --    Rotator Cuff repair --  --          Medication List  Name Date Started Instructions   amlodipine 5 mg oral tablet 09/09/2020 take 1 tablet (5 mg) by oral route once daily for 90 days   baclofen 10 mg oral tablet  take 1 tablet by oral route daily   BD Ultra-Fine Short Pen Needle 31 gauge x 5/16\" miscellaneous needle 12/10/2019 use as directed for 90 days   benazepril 40 mg oral tablet 09/09/2020 take 1 tablet (40 mg) by oral route once daily for 90 days   cetirizine 10 mg oral tablet  take 1 tablet (10 mg) by oral route once daily   colestipol 1 gram oral tablet 06/10/2020 take 1 tablet (1 gram) by oral route 2 times per day swallowing whole with any liquid. Do not crush, chew and/or divide. for 90 days   gabapentin 300 mg oral capsule  take 1 capsule by oral route 2 times a day   glipizide 5 mg oral tablet 09/09/2020 take 2 tablet (5 mg) by oral route in the morning and take 1 tablet in the evening   hydrocodone-acetaminophen  mg oral tablet  take 1 tablet by oral route every 12 hours as needed for pain   Lantus Solostar U-100 Insulin 100 unit/mL (3 mL) subcutaneous insulin pen 06/10/2020 inject 65 units by subcutaneous route once daily   Vitamin B-12 1,000 mcg oral tablet 09/09/2020 " "take 1 tablet by oral route daily for 90 days         Allergy List  Allergen Name Date Reaction Notes   PENICILLINS --  --  --          Family Medical History  Disease Name Relative/Age Notes   Chronic Obstructive Pulmonary Disease  --    Breast Neoplasm, Malignant  --    Stomach Neoplasm, Malignant  --    Stroke  --    Heart Disease  --    Cancer, Unspecified  thyroid   Prostate Cancer  --    Diabetes  --          Social History  Finding Status Start/Stop Quantity Notes   Alcohol Never --/-- --  --    Recreational Drug Use Never --/-- --  --    Tobacco Never --/-- --  --          Immunizations  NameDate Admin Mfg Trade Name Lot Number Route Inj VIS Given VIS Publication   Wkhtnzvlo21/10/2019 [facility] FLUZONE-HIGH DOSE [id number] IM LD 12/10/2019    Comments: patient tolerated well   Prevnar 1312/10/2019 [facility] PREVNAR 13 [id number] IM RD 12/10/2019    Comments: patient tolerated well         Review of Systems  · Constitutional  o Denies  o : fever, fatigue, weight loss, weight gain  · Cardiovascular  o Denies  o : lower extremity edema, claudication, chest pressure, palpitations  · Respiratory  o Denies  o : shortness of breath, wheezing, cough, hemoptysis, dyspnea on exertion  · Gastrointestinal  o Denies  o : nausea, vomiting, diarrhea, constipation, abdominal pain  · Integument  o Denies  o : rash, itching  · Endocrine  o Admits  o : central obesity  o Denies  o : polyuria, polydipsia  · Psychiatric  o Admits  o : anxiety, depression  o Denies  o : difficulty sleeping, suicidal ideation, homicidal ideation      Vitals  Date Time BP Position Site L\R Cuff Size HR RR TEMP (F) WT  HT  BMI kg/m2 BSA m2 O2 Sat        09/09/2020 11:23 /80 Sitting    70 - R  97.3 238lbs 6oz 5'  3\" 42.23 2.19 96 %          Physical Examination  · Constitutional  o Appearance  o : no acute distress, well-nourished  · Head and Face  o Head  o :   § Inspection  § : atraumatic, normocephalic  · Respiratory  o Respiratory Effort  o : breathing " comfortably, symmetric chest rise  o Auscultation of Lungs  o : clear to asculatation bilaterally, no wheezes, rales, or rhonchii  · Cardiovascular  o Heart  o :   § Auscultation of Heart  § : regular rate and rhythm, no murmurs, rubs, or gallops  o Peripheral Vascular System  o :   § Extremities  § : no edema  · Neurologic  o Mental Status Examination  o :   § Orientation  § : grossly oriented to person, place and time  o Gait and Station  o :   § Gait Screening  § : normal gait  · Psychiatric  o General  o : normal mood and affect  o Presence of Abnormal Thoughts  o : no hallucinations, no delusions present, no psychotic thoughts, no homicidal ideation, no suicidal ideation, no evidence of obsessional thinking          Assessment  · Screening for colon cancer     V76.51/Z12.11  · Visit for screening mammogram     V76.12/Z12.31  · Depression     311/F32.9  · Type 2 diabetes mellitus with stage 2 chronic kidney disease, with long-term current use of insulin       Type 2 diabetes mellitus with diabetic chronic kidney disease     250.40/E11.22  Chronic kidney disease, stage 2 (mild)     250.40/N18.2  Long term (current) use of insulin     250.40/Z79.4  · Essential hypertension     401.9/I10  · Insomnia, unspecified     780.52/G47.00  · Post-menopause     V49.81/Z78.0  · Vitamin B12 deficiency     266.2/E53.8    Problems Reconciled  Plan  · Orders  o COLONOSCOPY REFERRAL (COLON) - V76.51/Z12.11 - 09/09/2020   Dr. Lira; family hx colon cancer  o Screening Mammography; Bilateral 3D (94107, 80233, ) - V76.12/Z12.31 - 09/09/2020   please schedule with DEXA; previous mammo at [facility]  o Diabetes 1 Panel (CMP, Lipid, A1c) [facility] (80458, 79138, 26558) - 250.40/E11.22, 250.40/N18.2, 250.40/Z79.4 - 09/09/2020  o DEXA Bone Density, 1 or more sites, axial skeleton [facility] (56126) - V49.81/Z78.0 - 09/09/2020   please schedule with mammo  o Vitamin D Level (51561) - V49.81/Z78.0 - 09/09/2020  o ACO-39: Current  medications updated and reviewed () - - 09/09/2020  o ACO-18: Positive screen for clinical depression using a standardized tool and a follow-up plan documented () - 311/F32.9, 780.52/G47.00 - 09/09/2020   16 pts.  o ACO-19: Colorectal cancer screening results documented and reviewed (3017F) - - 09/09/2020 03/18/2013  o Vitamin B-12 (35883) - 266.2/E53.8 - 09/09/2020  o Folate (Folic Acid) (09272) - 266.2/E53.8 - 09/09/2020  · Medications  o trazodone 50 mg oral tablet   SIG: take 1/2 to 1 tablet by oral route once daily at bedtime for 30 days   DISP: (30) tablets with 2 refills  Prescribed on 09/09/2020     o amlodipine 5 mg oral tablet   SIG: take 1 tablet (5 mg) by oral route once daily for 90 days   DISP: (90) tablets with 1 refills  Adjusted on 09/09/2020     o benazepril 40 mg oral tablet   SIG: take 1 tablet (40 mg) by oral route once daily for 90 days   DISP: (90) tablets with 1 refills  Adjusted on 09/09/2020     o glipizide 5 mg oral tablet   SIG: take 2 tablet (5 mg) by oral route in the morning and take 1 tablet in the evening   DISP: (270) tablets with 1 refills  Adjusted on 09/09/2020     o Vitamin B-12 1,000 mcg oral tablet   SIG: take 1 tablet by oral route daily for 90 days   DISP: (90) tablets with 3 refills  Adjusted on 09/09/2020     o Medications have been Reconciled  o Transition of Care or Provider Policy  · Instructions  o Continue blood sugar monitoring daily and record. Bring your log to office visits. Call the office for readings below 70 and above 250 or any complications.  o Discussed with patient blood pressure monitoring, hemoglobin A1C levels need to be below 7.0, and LDL (Lipid) goals below 70.  o Stop taking calcium supplements for at least 48 hours prior to your exam. Failure to stop supplements could alter results, and the radiologists will require you to reschedule your test.  o Patient was educated/instructed on their diagnosis, treatment and medications prior to  discharge from the clinic today.  o Patient instructed to seek medical attention urgently for new or worsening symptoms.  o Call the office with any concerns or questions.  o Discussed Covid-19 precautions including, but not limited to, social distancing, avoid touching your face, and hand washing.   · Disposition  o Return to clinic in 3 months     We discussed we will start her on trazodone 50 mg, start with a half a tablet nightly.  We discussed this may also help with her depression.             Electronically Signed by: NHUNG Toledo -Author on September 9, 2020 01:34:53 PM

## 2021-05-13 NOTE — PROGRESS NOTES
Progress Note      Patient Name: Jane Huang   Patient ID: [id number]   Sex: Female   YOB: 1950    Primary Care Provider: Chrissy HOOKER    Visit Date: Vannesa 10, 2020    Provider: NHUNG Toledo   Location: [facility]   Location Address: [address] [place] [address], [address]  [id number]   Location Phone: [phone]          Chief Complaint  · 3 month follow up  · discuss insulin (fatigue)      History Of Present Illness  Jane Huang is a 70 year old /White female who presents for evaluation and treatment of:      3-month follow-up.    Diabetes type 2, insulin-dependent: She is currently on Lantus 65 units once daily and glipizide 5 mg twice daily.  Her last A1c 8.3% on 3/10/2020.  She is complaining of fatigue and believes it is related to insulin.  She states her blood sugars have been between 130 and 140 in the mornings.    Hypertension: Blood pressure is stable 124/70 on amlodipine 5 mg once daily and benazepril 40 mg once daily.    She does follow with pain management for her chronic back pain and she is on hydrocodone and gabapentin.  She also did receive some steroid injections in her back previously.  She states they are wanting to do some more steroid injections but she is declining at this time.    She has chronic diarrhea post having a colon resection, states Colestid helps but she cannot take it every day because it will cause her to get constipated.  She states she manages the best she can.       Past Medical History  Allergies; Arthritis; Broken Bones; Cataracts, bilateral; Colitis; Diabetes; Diabetes mellitus, type 2; Diverticulitis; Essential hypertension; Essential tremor; Forgetfulness; Gall stones; HTN (hypertension), benign; Kidney stones; Reflux; Screening breast examination; Screening for colon cancer; Sinus trouble         Past Surgical History  Cholecstectomy; Colon Resection; Colonoscopy; Hysterectomy; Joint Surgery; Rotator Cuff  "repair         Medication List  amlodipine 5 mg oral tablet; baclofen 10 mg oral tablet; BD Ultra-Fine Short Pen Needle 31 gauge x 5/16\" miscellaneous needle; benazepril 40 mg oral tablet; cetirizine 10 mg oral tablet; colestipol 1 gram oral tablet; gabapentin 300 mg oral capsule; glipizide 5 mg oral tablet; hydrocodone-acetaminophen  mg oral tablet; Lantus Solostar U-100 Insulin 100 unit/mL (3 mL) subcutaneous insulin pen         Allergy List  PENICILLINS         Family Medical History  Chronic Obstructive Pulmonary Disease; Breast Neoplasm, Malignant; Stomach Neoplasm, Malignant; Stroke; Heart Disease; Cancer, Unspecified; Prostate cancer; Diabetes         Social History  Alcohol (Never); Recreational Drug Use (Never); Tobacco (Never)         Immunizations  Name Date Admin   Influenza    Prevnar 13          Review of Systems  · Constitutional  o Admits  o : fatigue  o Denies  o : fever, weight loss, weight gain  · Cardiovascular  o Denies  o : lower extremity edema, claudication, chest pressure, palpitations  · Respiratory  o Denies  o : shortness of breath, wheezing, cough, hemoptysis, dyspnea on exertion  · Gastrointestinal  o Denies  o : nausea, vomiting, diarrhea, constipation, abdominal pain  · Integument  o Denies  o : rash, itching  · Endocrine  o Admits  o : central obesity  o Denies  o : polyuria, polydipsia      Vitals  Date Time BP Position Site L\R Cuff Size HR RR TEMP (F) WT  HT  BMI kg/m2 BSA m2 O2 Sat        06/10/2020 11:09 /70 Sitting    78 - R  97.5 235lbs 4oz 5'  3\" 41.67 2.18 95 %          Physical Examination  · Constitutional  o Appearance  o : no acute distress, well-nourished  · Head and Face  o Head  o :   § Inspection  § : atraumatic, normocephalic  · Neck  o Thyroid  o : gland size normal, nontender, no nodules or masses present on palpation, symmetric  · Respiratory  o Respiratory Effort  o : breathing comfortably, symmetric chest rise  o Auscultation of Lungs  o : clear " to asculatation bilaterally, no wheezes, rales, or rhonchii  · Cardiovascular  o Heart  o :   § Auscultation of Heart  § : regular rate and rhythm, no murmurs, rubs, or gallops  o Peripheral Vascular System  o :   § Extremities  § : no edema  · Lymphatic  o Neck  o : no lymphadenopathy present  · Neurologic  o Mental Status Examination  o :   § Orientation  § : grossly oriented to person, place and time  o Gait and Station  o :   § Gait Screening  § : normal gait  · Psychiatric  o General  o : normal mood and affect          Assessment  · Type 2 diabetes mellitus with stage 2 chronic kidney disease, with long-term current use of insulin       Type 2 diabetes mellitus with diabetic chronic kidney disease     250.40/E11.22  Chronic kidney disease, stage 2 (mild)     250.40/N18.2  Long term (current) use of insulin     250.40/Z79.4  · Essential hypertension     401.9/I10  · Fatigue     780.79/R53.83    Problems Reconciled  Plan  · Orders  o Diabetes 1 Panel (CMP, Lipid, A1c) [facility] (82610, 60995, 81264) - 250.40/E11.22, 250.40/N18.2, 250.40/Z79.4, 401.9/I10 - 06/10/2020  o Urine microalbumin (59464) - 250.40/E11.22, 250.40/N18.2, 250.40/Z79.4 - 06/10/2020  o CBC with Auto Diff [facility] (71821) - 780.79/R53.83 - 06/10/2020  o Thyroid Profile (47307, 98230, THYII) - 780.79/R53.83 - 06/10/2020  o B12 Folate levels (B12FO) - 780.79/R53.83 - 06/10/2020  o ACO-39: Current medications updated and reviewed () - - 06/10/2020  o ACO-15: Pneumococcal Vaccine Administered or Previously Received (4040F) - - 06/10/2020  o ACO-13: Fall Risk Screening with no falls in past year or only one fall without injury in the past year (1101F) - - 06/10/2020  o ACO - Pt declines to or was not able to provide an Advance Care Plan or name a Surrogate Decision Maker (1124F) - - 06/10/2020  · Medications  o colestipol 1 gram oral tablet   SIG: take 1 tablet (1 gram) by oral route 2 times per day swallowing whole with any liquid. Do not crush, chew  and/or divide. for 90 days   DISP: (180) tablet with 1 refills  Adjusted on 06/10/2020     o glipizide 5 mg oral tablet   SIG: take 1 tablet (5 mg) by oral route 2 times per day before meals for 90 days   DISP: (180) tablets with 1 refills  Adjusted on 06/10/2020     o Lantus Solostar U-100 Insulin 100 unit/mL (3 mL) subcutaneous insulin pen   SIG: inject 65 units by subcutaneous route once daily   DISP: (6) 3 ml syringe with 5 refills  Adjusted on 06/10/2020     o Medications have been Reconciled  o Transition of Care or Provider Policy  · Instructions  o Continue blood sugar monitoring daily and record. Bring your log to office visits. Call the office for readings below 70 and above 250 or any complications.  o Discussed with patient blood pressure monitoring, hemoglobin A1C levels need to be below 7.0, and LDL (Lipid) goals below 70.  o Patient was educated/instructed on their diagnosis, treatment and medications prior to discharge from the clinic today.  o Patient instructed to seek medical attention urgently for new or worsening symptoms.  o Call the office with any concerns or questions.  o Discussed Covid-19 precautions including, but not limited to, social distancing, avoid touching your face, and hand washing.   · Disposition  o Return to clinic in 3 months            Electronically Signed by: Chrissy Collins APRN -Author on Vannesa 10, 2020 12:18:33 PM

## 2021-05-13 NOTE — PROGRESS NOTES
Progress Note      Patient Name: Jane Huang   Patient ID: [id number]   Sex: Female   YOB: 1950    Primary Care Provider: Chrissy HOOKER   Referring Provider: Chrissy HOOKER    Visit Date: December 9, 2020    Provider: NHUNG Toledo   Location: [facility]   Location Address: [address] [place] [address], [address]  [id number]   Location Phone: [phone]          Chief Complaint  · 3-month follow-up      History Of Present Illness  Video Conferencing Visit  Jane Huang is a 70 year old /White female who is presenting for evaluation via video conferencing via [employer]. Verbal consent obtained before beginning visit.   The following staff were present during this visit: NHUNG Gardner.   Jane Huang is a 70 year old /White female who presents for evaluation and treatment of:      Diabetes type 2, insulin-dependent: Her last A1c was 8.2% on 9/10/2020, she is currently on glipizide 5 mg 2 tablets in the morning and 1 tablet in the evening.  And she is also on Lantus 65 units daily.  She states her blood sugars range between 110 and 177.    Hypertension: She states her blood pressures been good, usually low at 105/60.  She denies any dizziness.  She is currently on amlodipine 5 mg and benazepril 40 mg daily.    She states she is due for her pneumococcal vaccine this month and would like a prescription sent to [facility].    She is going to get her mammogram and bone density scan this week.    She states she is scheduled for colonoscopy next month.  She states they have told her to decrease her insulin dose to half of the amount she currently takes 2 days prior to her procedure and not to take any the day of her procedure.  She is question about whether she should continue glipizide.  I advised her to not take any glipizide the day before or the day of her procedure.    She continues to complain of fatigue.  Her  vitamin D level was very low and she is currently taking vitamin D2 50,000 units.       Review of Systems  · Constitutional  o Admits  o : fatigue  o Denies  o : fever, weight loss, weight gain  · Cardiovascular  o Denies  o : lower extremity edema, claudication, chest pressure, palpitations  · Respiratory  o Denies  o : shortness of breath, wheezing, cough, hemoptysis, dyspnea on exertion  · Gastrointestinal  o Denies  o : nausea, vomiting, diarrhea, constipation, abdominal pain  · Integument  o Denies  o : rash  · Endocrine  o Admits  o : central obesity  o Denies  o : polyuria, polydipsia      Physical Examination  · Constitutional  o Appearance  o : no acute distress, well-nourished  · Head and Face  o Head  o :   § Inspection  § : atraumatic, normocephalic  · Respiratory  o Respiratory Effort  o : breathing comfortably, symmetric chest rise  · Neurologic  o Mental Status Examination  o :   § Orientation  § : grossly oriented to person, place and time  · Psychiatric  o General  o : normal mood and affect          Assessment  · Diabetes mellitus, type 2     250.00/E11.9  · Essential hypertension     401.9/I10  · Vitamin D deficiency     268.9/E55.9  I reassured her that it takes time for her vitamin D to build up and that that may help with her fatigue as well.  · Need for pneumococcal vaccination     V03.82/Z23  She received Prevnar thirteen 1 year ago. She will get pneumococcal 23 vaccine at her pharmacy      Plan  · Orders  o ACO-39: Current medications updated and reviewed (1159F, ) - - 12/09/2020  · Medications  o Pneumovax-23 25 mcg/0.5 mL injection syringe   SIG: inject 0.5 milliliter by intramuscular route once for 1 day   DISP: (1) Milliliter with 0 refills  Prescribed on 12/09/2020     o amlodipine 5 mg oral tablet   SIG: take 1 tablet (5 mg) by oral route once daily for 90 days   DISP: (90) Tablet with 1 refills  Adjusted on 12/09/2020     o atorvastatin 10 mg oral tablet   SIG: take 1 tablet (10  mg) by oral route once daily at bedtime for 90 days   DISP: (90) Tablet with 1 refills  Adjusted on 12/09/2020     o benazepril 40 mg oral tablet   SIG: take 1 tablet (40 mg) by oral route once daily for 90 days   DISP: (90) Tablet with 1 refills  Adjusted on 12/09/2020     o Vitamin D2 1,250 mcg (50,000 unit) oral capsule   SIG: take 1 capsule by oral route once a week for 90 days   DISP: (13) Capsule with 1 refills  Adjusted on 12/09/2020     o Medications have been Reconciled  o Transition of Care or Provider Policy  · Instructions  o Continue blood sugar monitoring daily and record. Bring your log to office visits. Call the office for readings below 70 and above 250 or any complications.  o Discussed with patient blood pressure monitoring, hemoglobin A1C levels need to be below 7.0, and LDL (Lipid) goals below 70.  o Patient advised to monitor blood pressure (B/P) at home and journal readings. Patient informed that a B/P reading at home of more than 130/80 is considered hypertension. For readings greater erbb806/90 or higher patient is advised to follow up in the office with readings for management. Patient advised to limit sodium intake.  o Patient was educated/instructed on their diagnosis, treatment and medications.  o Patient instructed to seek medical attention urgently for new or worsening symptoms.  o Call the office with any concerns or questions.  · Disposition  o Return to clinic in 3 months            Electronically Signed by: NHUNG Toledo -Author on December 9, 2020 11:44:15 AM

## 2021-05-14 VITALS
OXYGEN SATURATION: 95 % | SYSTOLIC BLOOD PRESSURE: 124 MMHG | DIASTOLIC BLOOD PRESSURE: 70 MMHG | WEIGHT: 240.12 LBS | HEIGHT: 64 IN | TEMPERATURE: 98 F | HEART RATE: 84 BPM | BODY MASS INDEX: 41 KG/M2

## 2021-05-14 VITALS
TEMPERATURE: 97.3 F | BODY MASS INDEX: 42.23 KG/M2 | HEART RATE: 70 BPM | HEIGHT: 63 IN | OXYGEN SATURATION: 96 % | DIASTOLIC BLOOD PRESSURE: 80 MMHG | WEIGHT: 238.37 LBS | SYSTOLIC BLOOD PRESSURE: 126 MMHG

## 2021-05-14 VITALS — HEIGHT: 63 IN | BODY MASS INDEX: 42.08 KG/M2 | WEIGHT: 237.5 LBS

## 2021-05-14 NOTE — PROGRESS NOTES
Progress Note      Patient Name: Jane Huang   Patient ID: [id number]   Sex: Female   YOB: 1950    Primary Care Provider: Chrissy HOOKER   Referring Provider: Chrissy HOOKER    Visit Date: March 2, 2021    Provider: NHUNG Toledo   Location: [facility]   Location Address: [address] [place] [address], [address]  [id number]   Location Phone: [phone]          Chief Complaint  · follow up      History Of Present Illness  Jane Huang is a 71 year old /White female who presents for evaluation and treatment of:      She is here for 3-month follow-up.    Diabetes type 2, insulin-dependent: Her last A1c was 8.2% on 9/10/2020.  She is currently on glipizide 5 mg 2 tablets in the morning and 1 tablet in the evening and she is on Lantus 65 units daily.  Her LDL was 102 on 9/9/2020 and she was started on atorvastatin 10 mg once daily.    Hypertension: Blood pressure stable 124/70 on amlodipine 5 mg and benazepril 40 mg daily.    History of vitamin D deficiency: She is currently on vitamin D2 50,000 units weekly.  Her vitamin D was 12.8 on 9/9/2020.    She states she was seen at urgent care this past week for cough.  She states she was tested for flu and Covid and was negative for both.  She was darted on Mucinex and Tessalon Perles.  She states she is starting to feel better but she does complain of feeling tired but she states she always feels tired.    She states she is supposed to get the Covid vaccine this Thursday.    She states that she has seen Dr. Olsen podiatrist in the past.  She states that her left second toe has some artificial metal due to a history from years ago.  She states she recently jammed her toe against a piece of furniture and is been hurting and bothering her since.  She would like a referral to the podiatrist.    She states she is been having some tremors in her left arm and hand that is worse when she tries to  "write or carry something.  She does not want any more medications or intervention at this time.       Past Medical History  Disease Name Date Onset Notes   Allergic rhinitis, chronic --  --    Allergies --  --    Arthritis --  --    Arthritis --  --    Broken Bones --  --    Cataracts, bilateral --  --    Colitis --  --    Depression 09/09/2020 --    Diabetes --  --    Diabetes mellitus, type 2 06/11/2019 --    Diverticulitis --  --    Essential hypertension 06/11/2019 --    Essential tremor 06/11/2019 --    Forgetfulness --  --    Gall stones --  --    GERD --  --    High blood pressure --  --    HTN (hypertension), benign --  --    Insomnia, unspecified 09/09/2020 --    Kidney stones --  --    Reflux --  --    Screening breast examination --  --    Screening for colon cancer 3-18-13 --    Sinus trouble --  --    Vitamin B12 deficiency 09/09/2020 --    Vitamin D deficiency 12/09/2020 --          Past Surgical History  Procedure Name Date Notes   *Metal Implant --  --    Appendectomy --  --    Cholecstectomy --  --    Colon --  --    Colon Resection --  --    Colonoscopy 3-18-13 --    Gallbladder --  --    Hysterectomy --  --    Hysterectomy-Abdominal --  --    Joint Surgery --  --    Rotator Cuff repair --  --          Medication List  Name Date Started Instructions   amlodipine 5 mg oral tablet 12/09/2020 take 1 tablet (5 mg) by oral route once daily for 90 days   atorvastatin 10 mg oral tablet 12/09/2020 take 1 tablet (10 mg) by oral route once daily at bedtime for 90 days   baclofen 10 mg oral tablet  take 1 tablet by oral route daily   BD Ultra-Fine Short Pen Needle 31 gauge x 5/16\" miscellaneous needle 12/10/2019 use as directed for 90 days   benazepril 40 mg oral tablet 12/09/2020 take 1 tablet (40 mg) by oral route once daily for 90 days   cetirizine 10 mg oral tablet  take 1 tablet (10 mg) by oral route once daily   colestipol 1 gram oral tablet 06/10/2020 take 1 tablet (1 gram) by oral route 2 times per " day swallowing whole with any liquid. Do not crush, chew and/or divide. for 90 days   ergocalciferol (vitamin D2) 1,250 mcg (50,000 unit) oral capsule 01/20/2021 take 1 capsule by oral route once weekly   gabapentin 300 mg oral capsule  take 1 capsule by oral route 2 times a day   glipizide 5 mg oral tablet 09/09/2020 take 2 tablet (5 mg) by oral route in the morning and take 1 tablet in the evening   hydrocodone-acetaminophen  mg oral tablet  take 1 tablet by oral route every 12 hours as needed for pain   LANTUS SOLOSTAR PEN INJ 3ML 12/09/2020 ADMINISTER 65 UNITS UNDER THE SKIN EVERY DAY   Lantus Solostar U-100 Insulin 100 unit/mL (3 mL) subcutaneous insulin pen 06/10/2020 inject 65 units by subcutaneous route once daily   Pneumovax-23 25 mcg/0.5 mL injection syringe 12/09/2020 inject 0.5 milliliter by intramuscular route once for 1 day   Vitamin B-12 1,000 mcg oral tablet 09/09/2020 take 1 tablet by oral route daily for 90 days   Vitamin D2 1,250 mcg (50,000 unit) oral capsule 01/11/2021 take 1 capsule by oral route once a week for 30 days         Allergy List  Allergen Name Date Reaction Notes   PENICILLINS --  --  --          Family Medical History  Disease Name Relative/Age Notes   Chronic Obstructive Pulmonary Disease  --    Breast Neoplasm, Malignant  --    Stomach Neoplasm, Malignant  --    Stroke  --    Heart Disease  --    Cancer, Unspecified  thyroid   Diabetes, unspecified type Brother/  Mother/  Sister/   Mother; Brother; Sister; Child   Prostate cancer Brother/   Brother   Renal Calculus  Child   Family history of colon cancer Sister/70s   Sister/70s   Diabetes  --          Social History  Finding Status Start/Stop Quantity Notes   Alcohol Never --/-- --  09/25/2020 -    Recreational Drug Use Never --/-- --  --    Tobacco Never --/-- --  --          Immunizations  NameDate Admin Mfg Trade Name Lot Number Route Inj VIS Given VIS Publication   [id number]/04/2020 [facility] FLUZONE-HIGH DOSE [id number] IM   "11/04/2020    Comments: TOLERATED WELL, LEFT OFFICE STABLE   Prevnar 1312/10/2019 [facility] PREVNAR 13 [id number] IM RD 12/10/2019    Comments: patient tolerated well         Review of Systems  · Constitutional  o Denies  o : fever, fatigue, weight loss, weight gain  · Cardiovascular  o Denies  o : lower extremity edema, claudication, chest pressure, palpitations  · Respiratory  o Admits  o : productive cough  o Denies  o : shortness of breath, wheezing, abnormal sputum production, hemoptysis  · Gastrointestinal  o Denies  o : nausea, vomiting, diarrhea, constipation, abdominal pain  · Genitourinary  o Denies  o : urgency, frequency  · Integument  o Denies  o : rash, itching  · Endocrine  o Admits  o : central obesity  o Denies  o : polyuria, polydipsia      Vitals  Date Time BP Position Site L\R Cuff Size HR RR TEMP (F) WT  HT  BMI kg/m2 BSA m2 O2 Sat FR L/min FiO2 HC       03/02/2021 02:10 /70 Sitting    84 - R  98 240lbs 2oz 5'  4\" 41.22 2.22 95 %            Physical Examination  · Constitutional  o Appearance  o : no acute distress, well-nourished  · Head and Face  o Head  o :   § Inspection  § : atraumatic, normocephalic  · Neck  o Thyroid  o : gland size normal, nontender, no nodules or masses present on palpation, symmetric  · Respiratory  o Respiratory Effort  o : breathing comfortably, symmetric chest rise  o Auscultation of Lungs  o : clear to asculatation bilaterally, no wheezes, rales, or rhonchii  · Cardiovascular  o Heart  o :   § Auscultation of Heart  § : regular rate and rhythm, no murmurs, rubs, or gallops  o Peripheral Vascular System  o :   § Extremities  § : no edema  · Lymphatic  o Neck  o : no lymphadenopathy present  · Skin and Subcutaneous Tissue  o General Inspection  o : no rashes present, no areas of discoloration  · Neurologic  o Mental Status Examination  o :   § Orientation  § : grossly oriented to person, place and time  o Gait and Station  o :   § Gait Screening  § : normal " gait  · Psychiatric  o General  o : normal mood and affect          Assessment  · Type 2 diabetes mellitus with stage 3a chronic kidney disease, with long-term current use of insulin       Type 2 diabetes mellitus with diabetic chronic kidney disease     250.40/E11.22  Chronic kidney disease, stage 3a     250.40/N18.31  Long term (current) use of insulin     250.40/Z79.4  Stable on Lantus and glipizide as listed.  · Essential hypertension     401.9/I10  Stable on meds as listed.  · Vitamin D deficiency     268.9/E55.9  · Essential tremor     333.1/G25.0  Pt declines for intervention/treatment/meds at this time.   · Toe deformity     735.9/M20.60  I will get her referred to podiatrist.      Plan  · Orders  o Diabetes 1 Panel (CMP, Lipid, A1c) [facility] (30765, 95666, 13388) - 250.40/E11.22, 250.40/N18.31, 250.40/Z79.4, 401.9/I10 - 03/02/2021  o Vitamin D Level (04659) - 268.9/E55.9 - 03/02/2021  o ACO-39: Current medications updated and reviewed (, 1159F) - - 03/02/2021  o PODIATRY CONSULTATION (PODIA) - 735.9/M20.60, 250.40/E11.22 - 03/02/2021   HX metal implant in left 2nd toe, recently stumped toe, hx dm, has seen Dr. Olsen in the past  · Medications  o amlodipine 5 mg oral tablet   SIG: take 1 tablet (5 mg) by oral route once daily for 90 days   DISP: (90) Tablet with 1 refills  Adjusted on 03/02/2021     o benazepril 40 mg oral tablet   SIG: take 1 tablet (40 mg) by oral route once daily for 90 days   DISP: (90) Tablet with 1 refills  Adjusted on 03/02/2021     o Lantus Solostar U-100 Insulin 100 unit/mL (3 mL) subcutaneous insulin pen   SIG: inject 65 units by subcutaneous route once daily   DISP: (6) Pre-filled Pen Syringe with 5 refills  Adjusted on 03/02/2021     o LANTUS SOLOSTAR PEN INJ 3ML   SIG: ADMINISTER 65 UNITS UNDER THE SKIN EVERY DAY   DISP: (18) Milliliter with -1 refills  Discontinued on 03/02/2021     o Medications have been Reconciled  o Transition of Care or Provider  Policy  · Instructions  o Continue blood sugar monitoring daily and record. Bring your log to office visits. Call the office for readings below 70 and above 250 or any complications.  o Daily foot care. Avoid walking barefoot. Annual Dilated Eye Exam.  o Discussed with patient blood pressure monitoring, hemoglobin A1C levels need to be below 7.0, and LDL (Lipid) goals below 70.  o Patient was educated/instructed on their diagnosis, treatment and medications prior to discharge from the clinic today.  o Patient instructed to seek medical attention urgently for new or worsening symptoms.  o Call the office with any concerns or questions.  · Disposition  o Return to clinic in 3 months            Electronically Signed by: NHUNG Toledo -Author on March 2, 2021 03:53:13 PM

## 2021-05-15 VITALS
OXYGEN SATURATION: 98 % | WEIGHT: 233.37 LBS | HEART RATE: 71 BPM | BODY MASS INDEX: 41.35 KG/M2 | TEMPERATURE: 97.8 F | HEIGHT: 63 IN | SYSTOLIC BLOOD PRESSURE: 122 MMHG | DIASTOLIC BLOOD PRESSURE: 78 MMHG

## 2021-05-15 VITALS
RESPIRATION RATE: 16 BRPM | BODY MASS INDEX: 41.11 KG/M2 | SYSTOLIC BLOOD PRESSURE: 128 MMHG | DIASTOLIC BLOOD PRESSURE: 74 MMHG | WEIGHT: 232 LBS | TEMPERATURE: 96.8 F | HEART RATE: 68 BPM | OXYGEN SATURATION: 98 % | HEIGHT: 63 IN

## 2021-05-15 VITALS
HEIGHT: 63 IN | BODY MASS INDEX: 40.49 KG/M2 | SYSTOLIC BLOOD PRESSURE: 132 MMHG | TEMPERATURE: 97.1 F | WEIGHT: 228.5 LBS | RESPIRATION RATE: 16 BRPM | DIASTOLIC BLOOD PRESSURE: 54 MMHG | OXYGEN SATURATION: 97 % | HEART RATE: 86 BPM

## 2021-05-15 VITALS
DIASTOLIC BLOOD PRESSURE: 70 MMHG | TEMPERATURE: 97.5 F | BODY MASS INDEX: 41.68 KG/M2 | HEIGHT: 63 IN | HEART RATE: 78 BPM | OXYGEN SATURATION: 95 % | SYSTOLIC BLOOD PRESSURE: 124 MMHG | WEIGHT: 235.25 LBS

## 2021-05-15 VITALS
DIASTOLIC BLOOD PRESSURE: 64 MMHG | HEART RATE: 68 BPM | TEMPERATURE: 97.1 F | HEIGHT: 63 IN | WEIGHT: 231 LBS | SYSTOLIC BLOOD PRESSURE: 128 MMHG | BODY MASS INDEX: 40.93 KG/M2 | OXYGEN SATURATION: 97 % | RESPIRATION RATE: 16 BRPM

## 2021-06-02 ENCOUNTER — OFFICE VISIT CONVERTED (OUTPATIENT)
Dept: FAMILY MEDICINE CLINIC | Facility: CLINIC | Age: 71
End: 2021-06-02
Attending: NURSE PRACTITIONER

## 2021-06-05 NOTE — PROGRESS NOTES
Progress Note      Patient Name: Jane Huang   Patient ID: [id number]   Sex: Female   YOB: 1950    Primary Care Provider: Chrissy HOOKER   Referring Provider: Chrissy HOOKER    Visit Date: June 2, 2021    Provider: NHUNG Toledo   Location: [facility]   Location Address: [address] [place] [address], [address]  [id number]   Location Phone: [phone]          Chief Complaint  · follow up      History Of Present Illness  Jane Huang is a 71 year old /White female who presents for evaluation and treatment of:      She is here for 3-month follow-up.    Diabetes type 2, insulin-dependent: She states her blood sugars have been fluctuating anywhere from 98-1 85 in the mornings.  She is currently on Lantus 70 units once daily and she is on glipizide 5 mg 2 tablets in the morning and 1 tablet in the evening.  Her last A1c was elevated at 9% 3 months ago.  She has been on steroids with her pain management.  She does have chronic kidney disease with her last GFR at 48.    Hypertension: Her blood pressure stable at 134/72 on amlodipine 5 mg and benazepril 40 mg daily.    Hyperlipidemia: Her last LDL was good at 41 3 months ago.    She states that the podiatrist wants to amputate the end of her second toe on her left foot because it drops down.  She does not want to do the surgery at this time.  She states is really not too bothersome for her.  She does not have any sores or blisters.    She has been complaining of acid reflux especially at nighttime.  She states she used to be on Zantac before it was recalled.       Past Medical History  Disease Name Date Onset Notes   Allergic rhinitis, chronic --  --    Allergies --  --    Arthritis --  --    Arthritis --  --    Broken Bones --  --    Cataracts, bilateral --  --    Colitis --  --    Depression 09/09/2020 --    Diabetes --  --    Diabetes mellitus, type 2 06/11/2019 --    Diverticulitis --  " --    Essential hypertension 06/11/2019 --    Essential tremor 06/11/2019 --    Forgetfulness --  --    Gall stones --  --    GERD --  --    High blood pressure --  --    HTN (hypertension), benign --  --    Insomnia, unspecified 09/09/2020 --    Kidney Stones --  --    Reflux --  --    Screening breast examination --  --    Screening for colon cancer 3-18-13 --    Sinus trouble --  --    Toe deformity 03/02/2021 --    Vitamin B12 deficiency 09/09/2020 --    Vitamin D deficiency 12/09/2020 --          Past Surgical History  Procedure Name Date Notes   *Metal Implant --  --    Appendectomy --  --    Cholecstectomy --  --    Colon --  --    Colon Resection --  --    Colonoscopy 3-18-13 --    Gallbladder --  --    Hysterectomy --  --    Hysterectomy-Abdominal --  --    Joint Surgery --  --    Rotator Cuff repair --  --          Medication List  Name Date Started Instructions   amlodipine 5 mg oral tablet 03/02/2021 take 1 tablet (5 mg) by oral route once daily for 90 days   atorvastatin 10 mg oral tablet 12/09/2020 take 1 tablet (10 mg) by oral route once daily at bedtime for 90 days   baclofen 10 mg oral tablet  take 1 tablet by oral route daily   BD Ultra-Fine Short Pen Needle 31 gauge x 5/16\" miscellaneous needle 12/10/2019 use as directed for 90 days   benazepril 40 mg oral tablet 03/02/2021 take 1 tablet (40 mg) by oral route once daily for 90 days   Calcium 600 600 mg calcium (1,500 mg) oral tablet  take 1 tablet by oral route 2 times a day   cetirizine 10 mg oral tablet  take 1 tablet (10 mg) by oral route once daily   colestipol 1 gram oral tablet 05/10/2021 take 1 tablet (1 gram) by oral route 2 times per day swallowing whole with any liquid. Do not crush, chew and/or divide. for 90 days   ergocalciferol (vitamin D2) 1,250 mcg (50,000 unit) oral capsule 01/20/2021 take 1 capsule by oral route once weekly   gabapentin 300 mg oral capsule  take 1 capsule by oral route 2 times a day   glipizide 5 mg oral tablet " 05/10/2021 take 2 tablet (5 mg) by oral route in the morning and take 1 tablet in the evening   hydrocodone-acetaminophen  mg oral tablet  take 1 tablet by oral route every 12 hours as needed for pain   Lantus Solostar U-100 Insulin 100 unit/mL (3 mL) subcutaneous insulin pen 06/02/2021 inject 70 units by subcutaneous route once daily   Pneumovax-23 25 mcg/0.5 mL injection syringe 12/09/2020 inject 0.5 milliliter by intramuscular route once for 1 day   Vitamin B-12 1,000 mcg oral tablet 09/09/2020 take 1 tablet by oral route daily for 90 days         Allergy List  Allergen Name Date Reaction Notes   PENICILLINS --  --  --        Allergies Reconciled  Family Medical History  Disease Name Relative/Age Notes   Chronic Obstructive Pulmonary Disease  --    Breast Neoplasm, Malignant  --    Stomach Neoplasm, Malignant  --    Stroke  --    Heart Disease  --    Cancer, Unspecified  thyroid   Diabetes, unspecified type Brother/  Mother/  Sister/   Mother; Brother; Sister; Child   Prostate cancer Brother/   Brother   Renal Calculus  Child   Family history of colon cancer Sister/70s   Sister/70s   Diabetes  --          Social History  Finding Status Start/Stop Quantity Notes   Alcohol Never --/-- --  09/25/2020 -    Recreational Drug Use Never --/-- --  --    Tobacco Never --/-- --  --          Immunizations  NameDate Admin Mfg Trade Name Lot Number Route Inj VIS Given VIS Publication   Llfzgmtsp90/04/2020 [facility] FLUZONE-HIGH DOSE [id number] IM  11/04/2020    Comments: TOLERATED WELL, LEFT OFFICE STABLE   Prevnar 1312/10/2019 [facility] PREVNAR 13 [id number] IM RD 12/10/2019    Comments: patient tolerated well         Review of Systems  · Constitutional  o Denies  o : fever, fatigue, weight loss, weight gain  · Cardiovascular  o Denies  o : lower extremity edema, claudication, chest pressure, palpitations  · Respiratory  o Denies  o : shortness of breath, wheezing, cough, hemoptysis, dyspnea on exertion  · Gastrointestinal  o Admits  o :  "heartburn  o Denies  o : nausea, vomiting, diarrhea, constipation  · Integument  o Denies  o : rash, itching  · Endocrine  o Admits  o : central obesity  o Denies  o : polyuria, polydipsia      Vitals  Date Time BP Position Site L\R Cuff Size HR RR TEMP (F) WT  HT  BMI kg/m2 BSA m2 O2 Sat FR L/min FiO2 HC       06/02/2021 11:20 /72 Sitting    72 - R  98.7 238lbs 6oz 5'  4\" 40.92 2.21 96 %            Physical Examination  · Constitutional  o Appearance  o : no acute distress, well-nourished  · Head and Face  o Head  o :   § Inspection  § : atraumatic, normocephalic  · Neck  o Thyroid  o : gland size normal, nontender, no nodules or masses present on palpation, symmetric  · Respiratory  o Respiratory Effort  o : breathing comfortably, symmetric chest rise  o Auscultation of Lungs  o : clear to asculatation bilaterally, no wheezes, rales, or rhonchii  · Cardiovascular  o Heart  o :   § Auscultation of Heart  § : regular rate and rhythm, no murmurs, rubs, or gallops  o Peripheral Vascular System  o :   § Extremities  § : no edema  · Lymphatic  o Neck  o : no lymphadenopathy present  · Skin and Subcutaneous Tissue  o General Inspection  o : Left second toe drops downward, no skin breakdown, no discoloration.  · Neurologic  o Mental Status Examination  o :   § Orientation  § : grossly oriented to person, place and time  o Gait and Station  o :   § Gait Screening  § : normal gait  · Psychiatric  o General  o : normal mood and affect  · Left Foot Exam  o Left Foot Exam  o : The appearance of the left foot is normal compared to the right foot, There is no swelling, atrophy, or skin discoloration, Full Range of Motion, Good strength of quadriceps, hamstrings, dorsiflexors, and plantar flexors, Sensation is grossly intact, Neurovascularly intact to the left lower extremity, Dorsalis pedis pulse is 2+              Assessment  · Diabetes mellitus, type 2     250.00/E11.9  Diabetes not well controlled, currently on Lantus " 70 units and glipizide as prescribed. We will check her A1c today, will call with results. I did discuss with her I might possibly refer her to Joan Farmer, diabetic nurse practitioner.  · Essential hypertension     401.9/I10  Blood pressure is stable on meds as listed.  · GERD (gastroesophageal reflux disease)     530.81/K21.9  I will start her on Pepcid 40 mg p.o. at bedtime. Advised not to lay down after eating for at least 2 hours.      Plan  · Orders  o CMP [facility] (76413) - 250.00/E11.9 - 06/02/2021  o Hgb A1c [facility] ([id number]) - 250.00/E11.9 - 06/02/2021  o Urine microalbumin (91332) - 250.00/E11.9 - 06/02/2021  o ACO-39: Current medications updated and reviewed (1159F, ) - - 06/02/2021  · Medications  o Pepcid 40 mg oral tablet   SIG: take 1 tablet (40 mg) by oral route once daily at bedtime for 90 days   DISP: (90) Tablet with 1 refills  Prescribed on 06/02/2021     o atorvastatin 10 mg oral tablet   SIG: take 1 tablet (10 mg) by oral route once daily at bedtime for 90 days   DISP: (90) Tablet with 1 refills  Adjusted on 06/02/2021     o Lantus Solostar U-100 Insulin 100 unit/mL (3 mL) subcutaneous insulin pen   SIG: inject 70 units by subcutaneous route once daily   DISP: (6) Pre-filled Pen Syringe with 5 refills  Adjusted on 06/02/2021     o Pneumovax-23 25 mcg/0.5 mL injection syringe   SIG: inject 0.5 milliliter by intramuscular route once for 1 day   DISP: (1) Milliliter with 0 refills  Discontinued on 06/02/2021     o Medications have been Reconciled  o Transition of Care or Provider Policy  · Instructions  o Continue blood sugar monitoring daily and record. Bring your log to office visits. Call the office for readings below 70 and above 250 or any complications.  o Daily foot care. Avoid walking barefoot. Annual Dilated Eye Exam.  o Discussed with patient blood pressure monitoring, hemoglobin A1C levels need to be below 7.0, and LDL (Lipid) goals below 70.  o Maintain a healthy weight. Avoid tight  fitting clothes. Avoid fried, fatty foods, tomato sauce, chocolate, mint, garlic, onion, alcohol. caffeine. Eat smaller meals, dont lie down after a meal, dont smoke. Elevate the head of your bed 6-9 inches.  o Patient was educated/instructed on their diagnosis, treatment and medications prior to discharge from the clinic today.  o Patient instructed to seek medical attention urgently for new or worsening symptoms.  o Call the office with any concerns or questions.  · Disposition  o Return to clinic in 3 months            Electronically Signed by: NHUNG Toledo -Author on June 2, 2021 01:00:23 PM

## 2021-07-06 RX ORDER — ATORVASTATIN CALCIUM 10 MG/1
TABLET, FILM COATED ORAL
Qty: 90 TABLET | Refills: 1 | Status: SHIPPED | OUTPATIENT
Start: 2021-07-06 | End: 2021-12-28

## 2021-07-15 VITALS
SYSTOLIC BLOOD PRESSURE: 134 MMHG | BODY MASS INDEX: 40.69 KG/M2 | HEIGHT: 64 IN | WEIGHT: 238.37 LBS | OXYGEN SATURATION: 96 % | DIASTOLIC BLOOD PRESSURE: 72 MMHG | TEMPERATURE: 98.7 F | HEART RATE: 72 BPM

## 2021-07-28 RX ORDER — LANOLIN ALCOHOL/MO/W.PET/CERES
CREAM (GRAM) TOPICAL
Qty: 90 TABLET | Refills: 1 | Status: SHIPPED | OUTPATIENT
Start: 2021-07-28 | End: 2022-01-28

## 2021-09-07 ENCOUNTER — OFFICE VISIT (OUTPATIENT)
Dept: FAMILY MEDICINE CLINIC | Facility: CLINIC | Age: 71
End: 2021-09-07

## 2021-09-07 VITALS
TEMPERATURE: 99.1 F | SYSTOLIC BLOOD PRESSURE: 124 MMHG | HEART RATE: 83 BPM | HEIGHT: 64 IN | OXYGEN SATURATION: 95 % | DIASTOLIC BLOOD PRESSURE: 74 MMHG | BODY MASS INDEX: 41.21 KG/M2 | WEIGHT: 241.4 LBS

## 2021-09-07 DIAGNOSIS — L57.0 ACTINIC KERATOSIS: ICD-10-CM

## 2021-09-07 DIAGNOSIS — Z79.4 TYPE 2 DIABETES MELLITUS WITH HYPERGLYCEMIA, WITH LONG-TERM CURRENT USE OF INSULIN (HCC): Primary | ICD-10-CM

## 2021-09-07 DIAGNOSIS — E55.9 VITAMIN D DEFICIENCY: ICD-10-CM

## 2021-09-07 DIAGNOSIS — Z23 NEED FOR PNEUMOCOCCAL VACCINATION: ICD-10-CM

## 2021-09-07 DIAGNOSIS — E11.65 TYPE 2 DIABETES MELLITUS WITH HYPERGLYCEMIA, WITH LONG-TERM CURRENT USE OF INSULIN (HCC): Primary | ICD-10-CM

## 2021-09-07 DIAGNOSIS — I10 ESSENTIAL HYPERTENSION: ICD-10-CM

## 2021-09-07 PROBLEM — K21.9 GERD (GASTROESOPHAGEAL REFLUX DISEASE): Status: ACTIVE | Noted: 2021-06-02

## 2021-09-07 PROBLEM — E11.9 DIABETES MELLITUS, TYPE 2: Status: ACTIVE | Noted: 2019-06-11

## 2021-09-07 PROBLEM — H26.9 CATARACTS, BILATERAL: Status: ACTIVE | Noted: 2021-09-07

## 2021-09-07 PROBLEM — E53.8 VITAMIN B12 DEFICIENCY: Status: ACTIVE | Noted: 2020-09-09

## 2021-09-07 PROBLEM — K57.92 DIVERTICULITIS: Status: ACTIVE | Noted: 2021-09-07

## 2021-09-07 PROBLEM — N20.0 KIDNEY STONES: Status: ACTIVE | Noted: 2021-09-07

## 2021-09-07 PROBLEM — K52.3 INDETERMINATE COLITIS: Status: ACTIVE | Noted: 2021-09-07

## 2021-09-07 PROBLEM — G25.0 ESSENTIAL TREMOR: Status: ACTIVE | Noted: 2019-06-11

## 2021-09-07 PROBLEM — K80.20 GALL STONES: Status: ACTIVE | Noted: 2021-09-07

## 2021-09-07 PROBLEM — M19.90 ARTHRITIS: Status: ACTIVE | Noted: 2021-09-07

## 2021-09-07 PROBLEM — T14.8XXA BROKEN BONES: Status: ACTIVE | Noted: 2021-09-07

## 2021-09-07 PROBLEM — G47.00 INSOMNIA, UNSPECIFIED: Status: ACTIVE | Noted: 2020-09-09

## 2021-09-07 PROBLEM — M20.60 TOE DEFORMITY: Status: ACTIVE | Noted: 2021-03-02

## 2021-09-07 PROBLEM — F32.A DEPRESSION: Status: ACTIVE | Noted: 2020-09-09

## 2021-09-07 PROBLEM — J01.80 OTHER ACUTE SINUSITIS: Status: ACTIVE | Noted: 2021-09-07

## 2021-09-07 LAB
25(OH)D3 SERPL-MCNC: 44.5 NG/ML
ALBUMIN SERPL-MCNC: 4 G/DL (ref 3.5–5.2)
ALBUMIN/GLOB SERPL: 1.1 G/DL
ALP SERPL-CCNC: 79 U/L (ref 39–117)
ALT SERPL W P-5'-P-CCNC: 35 U/L (ref 1–33)
ANION GAP SERPL CALCULATED.3IONS-SCNC: 8.7 MMOL/L (ref 5–15)
AST SERPL-CCNC: 38 U/L (ref 1–32)
BASOPHILS # BLD AUTO: 0.05 10*3/MM3 (ref 0–0.2)
BASOPHILS NFR BLD AUTO: 0.8 % (ref 0–1.5)
BILIRUB SERPL-MCNC: 0.4 MG/DL (ref 0–1.2)
BUN SERPL-MCNC: 16 MG/DL (ref 8–23)
BUN/CREAT SERPL: 13.9 (ref 7–25)
CALCIUM SPEC-SCNC: 9.4 MG/DL (ref 8.6–10.5)
CHLORIDE SERPL-SCNC: 105 MMOL/L (ref 98–107)
CHOLEST SERPL-MCNC: 116 MG/DL (ref 0–200)
CO2 SERPL-SCNC: 25.3 MMOL/L (ref 22–29)
CREAT SERPL-MCNC: 1.15 MG/DL (ref 0.57–1)
DEPRECATED RDW RBC AUTO: 45.9 FL (ref 37–54)
EOSINOPHIL # BLD AUTO: 0.11 10*3/MM3 (ref 0–0.4)
EOSINOPHIL NFR BLD AUTO: 1.7 % (ref 0.3–6.2)
ERYTHROCYTE [DISTWIDTH] IN BLOOD BY AUTOMATED COUNT: 12.6 % (ref 12.3–15.4)
GFR SERPL CREATININE-BSD FRML MDRD: 46 ML/MIN/1.73
GLOBULIN UR ELPH-MCNC: 3.7 GM/DL
GLUCOSE SERPL-MCNC: 176 MG/DL (ref 65–99)
HBA1C MFR BLD: 8 % (ref 4.8–5.6)
HCT VFR BLD AUTO: 47 % (ref 34–46.6)
HDLC SERPL-MCNC: 37 MG/DL (ref 40–60)
HGB BLD-MCNC: 14.8 G/DL (ref 12–15.9)
IMM GRANULOCYTES # BLD AUTO: 0.02 10*3/MM3 (ref 0–0.05)
IMM GRANULOCYTES NFR BLD AUTO: 0.3 % (ref 0–0.5)
LDLC SERPL CALC-MCNC: 55 MG/DL (ref 0–100)
LDLC/HDLC SERPL: 1.39 {RATIO}
LYMPHOCYTES # BLD AUTO: 2.31 10*3/MM3 (ref 0.7–3.1)
LYMPHOCYTES NFR BLD AUTO: 36.4 % (ref 19.6–45.3)
MCH RBC QN AUTO: 31 PG (ref 26.6–33)
MCHC RBC AUTO-ENTMCNC: 31.5 G/DL (ref 31.5–35.7)
MCV RBC AUTO: 98.3 FL (ref 79–97)
MONOCYTES # BLD AUTO: 0.39 10*3/MM3 (ref 0.1–0.9)
MONOCYTES NFR BLD AUTO: 6.1 % (ref 5–12)
NEUTROPHILS NFR BLD AUTO: 3.47 10*3/MM3 (ref 1.7–7)
NEUTROPHILS NFR BLD AUTO: 54.7 % (ref 42.7–76)
NRBC BLD AUTO-RTO: 0 /100 WBC (ref 0–0.2)
PLATELET # BLD AUTO: 181 10*3/MM3 (ref 140–450)
PMV BLD AUTO: 11.8 FL (ref 6–12)
POTASSIUM SERPL-SCNC: 4.5 MMOL/L (ref 3.5–5.2)
PROT SERPL-MCNC: 7.7 G/DL (ref 6–8.5)
RBC # BLD AUTO: 4.78 10*6/MM3 (ref 3.77–5.28)
SODIUM SERPL-SCNC: 139 MMOL/L (ref 136–145)
T4 FREE SERPL-MCNC: 1.03 NG/DL (ref 0.93–1.7)
TRIGL SERPL-MCNC: 138 MG/DL (ref 0–150)
TSH SERPL DL<=0.05 MIU/L-ACNC: 2.65 UIU/ML (ref 0.27–4.2)
VLDLC SERPL-MCNC: 24 MG/DL (ref 5–40)
WBC # BLD AUTO: 6.35 10*3/MM3 (ref 3.4–10.8)

## 2021-09-07 PROCEDURE — 84443 ASSAY THYROID STIM HORMONE: CPT | Performed by: NURSE PRACTITIONER

## 2021-09-07 PROCEDURE — 84439 ASSAY OF FREE THYROXINE: CPT | Performed by: NURSE PRACTITIONER

## 2021-09-07 PROCEDURE — 80053 COMPREHEN METABOLIC PANEL: CPT | Performed by: NURSE PRACTITIONER

## 2021-09-07 PROCEDURE — 80061 LIPID PANEL: CPT | Performed by: NURSE PRACTITIONER

## 2021-09-07 PROCEDURE — 99214 OFFICE O/P EST MOD 30 MIN: CPT | Performed by: NURSE PRACTITIONER

## 2021-09-07 PROCEDURE — 85025 COMPLETE CBC W/AUTO DIFF WBC: CPT | Performed by: NURSE PRACTITIONER

## 2021-09-07 PROCEDURE — 83036 HEMOGLOBIN GLYCOSYLATED A1C: CPT | Performed by: NURSE PRACTITIONER

## 2021-09-07 PROCEDURE — 82306 VITAMIN D 25 HYDROXY: CPT | Performed by: NURSE PRACTITIONER

## 2021-09-07 RX ORDER — GABAPENTIN 300 MG/1
300 CAPSULE ORAL 3 TIMES DAILY PRN
COMMUNITY
Start: 2021-08-30

## 2021-09-07 RX ORDER — GLIPIZIDE 5 MG/1
TABLET ORAL
COMMUNITY
Start: 2021-08-01 | End: 2021-10-27

## 2021-09-07 RX ORDER — AMLODIPINE BESYLATE 5 MG/1
1 TABLET ORAL DAILY
COMMUNITY
Start: 2021-09-01 | End: 2022-03-08 | Stop reason: SDUPTHER

## 2021-09-07 RX ORDER — MONTELUKAST SODIUM 4 MG/1
1 TABLET, CHEWABLE ORAL DAILY PRN
COMMUNITY
Start: 2021-08-18 | End: 2021-12-28

## 2021-09-07 RX ORDER — ERGOCALCIFEROL 1.25 MG/1
50000 CAPSULE ORAL
COMMUNITY
Start: 2021-09-02 | End: 2022-03-30

## 2021-09-07 RX ORDER — FAMOTIDINE 40 MG/1
TABLET, FILM COATED ORAL
COMMUNITY
Start: 2021-08-29 | End: 2021-11-29

## 2021-09-07 RX ORDER — BENAZEPRIL HYDROCHLORIDE 20 MG/1
20 TABLET ORAL DAILY
Qty: 90 TABLET | Refills: 1 | Status: SHIPPED | OUTPATIENT
Start: 2021-09-07 | End: 2021-12-16

## 2021-09-07 RX ORDER — BENAZEPRIL HYDROCHLORIDE 40 MG/1
1 TABLET, FILM COATED ORAL DAILY
COMMUNITY
Start: 2021-09-01 | End: 2021-09-07 | Stop reason: SDUPTHER

## 2021-09-07 RX ORDER — PEN NEEDLE, DIABETIC 31 GX5/16"
NEEDLE, DISPOSABLE MISCELLANEOUS SEE ADMIN INSTRUCTIONS
COMMUNITY
Start: 2021-08-22 | End: 2022-01-28

## 2021-09-07 RX ORDER — INSULIN GLARGINE 100 [IU]/ML
70 INJECTION, SOLUTION SUBCUTANEOUS DAILY
COMMUNITY
Start: 2021-09-06 | End: 2022-02-22 | Stop reason: SDUPTHER

## 2021-09-07 RX ORDER — BACLOFEN 10 MG/1
10 TABLET ORAL 3 TIMES DAILY
COMMUNITY
Start: 2021-08-24

## 2021-09-07 RX ORDER — HYDROCODONE BITARTRATE AND ACETAMINOPHEN 10; 325 MG/1; MG/1
1 TABLET ORAL EVERY 12 HOURS PRN
COMMUNITY
Start: 2021-08-29

## 2021-09-07 RX ORDER — CETIRIZINE HYDROCHLORIDE 10 MG/1
1 TABLET ORAL DAILY
COMMUNITY

## 2021-09-07 NOTE — ASSESSMENT & PLAN NOTE
Hypertension is Having hypotension with symptoms.  Medication changes per orders.  Ambulatory blood pressure monitoring.  I will have her decrease benazepril 40 mg dose to 20 mg once daily, continue amlodipine 5 mg daily.  Blood pressure will be reassessed in 3 months.

## 2021-09-07 NOTE — PROGRESS NOTES
"Chief Complaint  Follow-up, Diabetes, and Hypertension    Subjective          Jane Huang presents to [facility]  History of Present Illness  She is here for 3-month follow-up on diabetes and hypertension.    Hypertension: She states that her blood pressure has been running low at home and that she has been feeling very tired and off balance.  She states her blood pressure has been systolic around 70 and diastolic around 48.  Her blood pressure is stable today at 124/74.  She is currently on amlodipine 5 mg daily and benazepril 40 mg daily.    Diabetes type 2, insulin-dependent, uncontrolled: Her last A1c was 8.8%, her GFR was 51.  She states her blood sugars 145 this morning.  She is currently on Lantus 70 units daily and glipizide 5 mg 2 tablets in the morning and 1 tablet in the evening.    History of vitamin D deficiency: Her last vitamin D level was 22.9, she is on vitamin D 50,000 units weekly.    She is complaining of multiple spots on her back that seems to be getting worse.  She is questing a dermatology referral.    She has had her Prevnar 13 pneumonia vaccine but has not had the pneumococcal 23 and would like to get that today.    Patient was also seen by Riana Jeronimo, NP student.    Objective   Vital Signs:   /74   Pulse 83   Temp 99.1 °F (37.3 °C)   Ht 162.6 cm (64\")   Wt 109 kg (241 lb 6.4 oz)   SpO2 95%   BMI 41.44 kg/m²     Physical Exam  Vitals reviewed.   Constitutional:       Appearance: Normal appearance. She is well-developed.   Neck:      Thyroid: No thyroid mass, thyromegaly or thyroid tenderness.   Cardiovascular:      Rate and Rhythm: Normal rate and regular rhythm.      Heart sounds: No murmur heard.   No friction rub. No gallop.    Pulmonary:      Effort: Pulmonary effort is normal.      Breath sounds: Normal breath sounds. No wheezing or rhonchi.   Lymphadenopathy:      Cervical: No cervical adenopathy.   Skin:     General: Skin is warm " and dry.      Comments: Multiple raised dark lesions, dry and scaly.   Neurological:      Mental Status: She is alert and oriented to person, place, and time.      Cranial Nerves: No cranial nerve deficit.   Psychiatric:         Mood and Affect: Mood and affect normal.         Behavior: Behavior normal.         Thought Content: Thought content normal. Thought content does not include homicidal or suicidal ideation.         Judgment: Judgment normal.        Result Review :                 Assessment and Plan    Diagnoses and all orders for this visit:    1. Type 2 diabetes mellitus with hyperglycemia, with long-term current use of insulin (CMS/[facility]) (Primary)  -     Hemoglobin A1c  -     Comprehensive Metabolic Panel  -     Lipid Panel  -     CBC & Differential  -     TSH+Free T4    2. Essential hypertension  Assessment & Plan:  Hypertension is Having hypotension with symptoms.  Medication changes per orders.  Ambulatory blood pressure monitoring.  I will have her decrease benazepril 40 mg dose to 20 mg once daily, continue amlodipine 5 mg daily.  Blood pressure will be reassessed in 3 months.    Orders:  -     CBC & Differential    3. Vitamin D deficiency  Assessment & Plan:  We will check vitamin D level today with her labs.    Orders:  -     Vitamin D 25 Hydroxy    4. Actinic keratosis  Assessment & Plan:  We will send her to dermatology due to multiple lesions.    Orders:  -     Ambulatory Referral to Dermatology    5. Need for pneumococcal vaccination  -     pneumococcal polysaccharide 23-valent (PNEUMOVAX-23) vaccine 0.5 mL    Other orders  -     benazepril (LOTENSIN) 20 MG tablet; Take 1 tablet by mouth Daily.  Dispense: 90 tablet; Refill: 1      Follow Up   Return in about 3 months (around 12/7/2021) for Next scheduled follow up.  Patient was given instructions and counseling regarding her condition or for health maintenance advice. Please see specific information pulled into the AVS if appropriate.

## 2021-10-27 RX ORDER — GLIPIZIDE 5 MG/1
TABLET ORAL
Qty: 270 TABLET | Refills: 0 | Status: SHIPPED | OUTPATIENT
Start: 2021-10-27 | End: 2022-01-28

## 2021-11-29 RX ORDER — FAMOTIDINE 40 MG/1
TABLET, FILM COATED ORAL
Qty: 90 TABLET | Refills: 0 | Status: SHIPPED | OUTPATIENT
Start: 2021-11-29 | End: 2022-02-25

## 2021-12-07 ENCOUNTER — OFFICE VISIT (OUTPATIENT)
Dept: FAMILY MEDICINE CLINIC | Facility: CLINIC | Age: 71
End: 2021-12-07

## 2021-12-07 VITALS
SYSTOLIC BLOOD PRESSURE: 142 MMHG | OXYGEN SATURATION: 97 % | WEIGHT: 243.4 LBS | DIASTOLIC BLOOD PRESSURE: 84 MMHG | HEIGHT: 64 IN | BODY MASS INDEX: 41.55 KG/M2 | TEMPERATURE: 98 F | HEART RATE: 74 BPM

## 2021-12-07 DIAGNOSIS — E11.65 TYPE 2 DIABETES MELLITUS WITH HYPERGLYCEMIA, WITH LONG-TERM CURRENT USE OF INSULIN (HCC): Primary | ICD-10-CM

## 2021-12-07 DIAGNOSIS — I10 ESSENTIAL HYPERTENSION: ICD-10-CM

## 2021-12-07 DIAGNOSIS — Z79.4 TYPE 2 DIABETES MELLITUS WITH HYPERGLYCEMIA, WITH LONG-TERM CURRENT USE OF INSULIN (HCC): Primary | ICD-10-CM

## 2021-12-07 PROBLEM — M47.816 LUMBAR SPONDYLOSIS: Status: ACTIVE | Noted: 2019-01-16

## 2021-12-07 PROBLEM — G89.4 CHRONIC PAIN DISORDER: Status: ACTIVE | Noted: 2019-01-16

## 2021-12-07 PROBLEM — M46.1 INFLAMMATION OF SACROILIAC JOINT: Status: ACTIVE | Noted: 2021-12-07

## 2021-12-07 PROBLEM — M51.36 DEGENERATION OF LUMBAR INTERVERTEBRAL DISC: Status: ACTIVE | Noted: 2019-01-16

## 2021-12-07 PROBLEM — M51.369 DEGENERATION OF LUMBAR INTERVERTEBRAL DISC: Status: ACTIVE | Noted: 2019-01-16

## 2021-12-07 LAB — HBA1C MFR BLD: 8.02 % (ref 4.8–5.6)

## 2021-12-07 PROCEDURE — G0008 ADMIN INFLUENZA VIRUS VAC: HCPCS | Performed by: NURSE PRACTITIONER

## 2021-12-07 PROCEDURE — 83036 HEMOGLOBIN GLYCOSYLATED A1C: CPT | Performed by: NURSE PRACTITIONER

## 2021-12-07 PROCEDURE — 36415 COLL VENOUS BLD VENIPUNCTURE: CPT | Performed by: NURSE PRACTITIONER

## 2021-12-07 PROCEDURE — 90662 IIV NO PRSV INCREASED AG IM: CPT | Performed by: NURSE PRACTITIONER

## 2021-12-07 PROCEDURE — 80053 COMPREHEN METABOLIC PANEL: CPT | Performed by: NURSE PRACTITIONER

## 2021-12-07 PROCEDURE — 99214 OFFICE O/P EST MOD 30 MIN: CPT | Performed by: NURSE PRACTITIONER

## 2021-12-07 NOTE — ASSESSMENT & PLAN NOTE
Hypertension is unchanged.  Continue current treatment regimen.  Continue current medications.  Blood pressure will be reassessed in 3 months.

## 2021-12-07 NOTE — ASSESSMENT & PLAN NOTE
Diabetes is improving with treatment.   Continue current treatment regimen.  Discussed foot care.  Reminded to get yearly retinal exam.  Diabetes will be reassessed With A1c today and again in 3 months.

## 2021-12-07 NOTE — PROGRESS NOTES
"Chief Complaint  3 month follow up, Hypertension, and Diabetes    Subjective          Jane Huang presents to [facility]  History of Present Illness   She presents today for 3-month follow-up.    Diabetes type 2, insulin-dependent: She is currently on Lantus 70 units daily.  Her last A1c was 8% 3 months ago.  She is also on glipizide 5 mg 2 tablets in the morning and 1 tablet in the evening.  She states her blood sugars have been better.  She states her fasting blood sugars are ranging between 104-110 and she states her evening blood sugars average around 150.    Vitamin D deficiency: She is currently on vitamin D 50,000 units weekly.    Hypertension: Blood pressure stable on benazepril 20 mg daily and amlodipine 5 mg daily.  Objective   Vital Signs:   /84   Pulse 74   Temp 98 °F (36.7 °C)   Ht 162.6 cm (64\")   Wt 110 kg (243 lb 6.4 oz)   SpO2 97%   BMI 41.78 kg/m²     Physical Exam  Vitals reviewed.   Constitutional:       Appearance: Normal appearance. She is well-developed.   Neck:      Thyroid: No thyroid mass, thyromegaly or thyroid tenderness.   Cardiovascular:      Rate and Rhythm: Normal rate and regular rhythm.      Heart sounds: No murmur heard.  No friction rub. No gallop.    Pulmonary:      Effort: Pulmonary effort is normal.      Breath sounds: Normal breath sounds. No wheezing or rhonchi.   Lymphadenopathy:      Cervical: No cervical adenopathy.   Skin:     General: Skin is warm and dry.   Neurological:      Mental Status: She is alert and oriented to person, place, and time.      Cranial Nerves: No cranial nerve deficit.   Psychiatric:         Mood and Affect: Mood and affect normal.         Behavior: Behavior normal.         Thought Content: Thought content normal. Thought content does not include homicidal or suicidal ideation.         Judgment: Judgment normal.        Result Review :                 Assessment and Plan    Diagnoses and all orders for " this visit:    1. Type 2 diabetes mellitus with hyperglycemia, with long-term current use of insulin (HCC) (Primary)  Assessment & Plan:  Diabetes is improving with treatment.   Continue current treatment regimen.  Discussed foot care.  Reminded to get yearly retinal exam.  Diabetes will be reassessed With A1c today and again in 3 months.    Orders:  -     Hemoglobin A1c  -     Comprehensive Metabolic Panel    2. Essential hypertension  Assessment & Plan:  Hypertension is unchanged.  Continue current treatment regimen.  Continue current medications.  Blood pressure will be reassessed in 3 months.    Orders:  -     Comprehensive Metabolic Panel    Other orders  -     Fluzone High-Dose 65+yrs ([id number])      Follow Up   Return in about 3 months (around 3/7/2022) for Medicare Wellness.  Patient was given instructions and counseling regarding her condition or for health maintenance advice. Please see specific information pulled into the AVS if appropriate.

## 2021-12-08 LAB
ALBUMIN SERPL-MCNC: 3.7 G/DL (ref 3.5–5.2)
ALBUMIN/GLOB SERPL: 1 G/DL
ALP SERPL-CCNC: 76 U/L (ref 39–117)
ALT SERPL W P-5'-P-CCNC: 32 U/L (ref 1–33)
ANION GAP SERPL CALCULATED.3IONS-SCNC: 9.2 MMOL/L (ref 5–15)
AST SERPL-CCNC: 32 U/L (ref 1–32)
BILIRUB SERPL-MCNC: 0.3 MG/DL (ref 0–1.2)
BUN SERPL-MCNC: 15 MG/DL (ref 8–23)
BUN/CREAT SERPL: 16.5 (ref 7–25)
CALCIUM SPEC-SCNC: 9.1 MG/DL (ref 8.6–10.5)
CHLORIDE SERPL-SCNC: 103 MMOL/L (ref 98–107)
CO2 SERPL-SCNC: 25.8 MMOL/L (ref 22–29)
CREAT SERPL-MCNC: 0.91 MG/DL (ref 0.57–1)
GFR SERPL CREATININE-BSD FRML MDRD: 61 ML/MIN/1.73
GLOBULIN UR ELPH-MCNC: 3.7 GM/DL
GLUCOSE SERPL-MCNC: 167 MG/DL (ref 65–99)
POTASSIUM SERPL-SCNC: 4.3 MMOL/L (ref 3.5–5.2)
PROT SERPL-MCNC: 7.4 G/DL (ref 6–8.5)
SODIUM SERPL-SCNC: 138 MMOL/L (ref 136–145)

## 2021-12-16 RX ORDER — BENAZEPRIL HYDROCHLORIDE 20 MG/1
20 TABLET ORAL DAILY
Qty: 90 TABLET | Refills: 1 | Status: SHIPPED | OUTPATIENT
Start: 2021-12-16 | End: 2022-03-08 | Stop reason: SDUPTHER

## 2021-12-28 RX ORDER — ATORVASTATIN CALCIUM 10 MG/1
TABLET, FILM COATED ORAL
Qty: 90 TABLET | Refills: 1 | Status: SHIPPED | OUTPATIENT
Start: 2021-12-28 | End: 2022-06-20

## 2021-12-28 RX ORDER — MONTELUKAST SODIUM 4 MG/1
TABLET, CHEWABLE ORAL
Qty: 180 TABLET | Refills: 1 | Status: SHIPPED | OUTPATIENT
Start: 2021-12-28 | End: 2022-09-02

## 2022-01-28 RX ORDER — PEN NEEDLE, DIABETIC 31 GX5/16"
NEEDLE, DISPOSABLE MISCELLANEOUS
Qty: 100 EACH | Refills: 2 | Status: SHIPPED | OUTPATIENT
Start: 2022-01-28 | End: 2022-11-27 | Stop reason: SDUPTHER

## 2022-01-28 RX ORDER — LANOLIN ALCOHOL/MO/W.PET/CERES
CREAM (GRAM) TOPICAL
Qty: 90 TABLET | Refills: 1 | Status: SHIPPED | OUTPATIENT
Start: 2022-01-28

## 2022-01-28 RX ORDER — GLIPIZIDE 5 MG/1
TABLET ORAL
Qty: 270 TABLET | Refills: 1 | Status: SHIPPED | OUTPATIENT
Start: 2022-01-28 | End: 2022-07-29

## 2022-02-22 ENCOUNTER — PATIENT MESSAGE (OUTPATIENT)
Dept: FAMILY MEDICINE CLINIC | Facility: CLINIC | Age: 72
End: 2022-02-22

## 2022-02-22 RX ORDER — INSULIN GLARGINE 100 [IU]/ML
70 INJECTION, SOLUTION SUBCUTANEOUS DAILY
Qty: 21 PEN | Refills: 1 | Status: SHIPPED | OUTPATIENT
Start: 2022-02-22 | End: 2022-08-18 | Stop reason: SDUPTHER

## 2022-02-22 NOTE — TELEPHONE ENCOUNTER
From: Jane Huang  To: NHUNG Ramos  Sent: 2/22/2022 1:06 PM EST  Subject: Lantus rx    Could you please call in a refill for my Lantus. I do not have enough to last until my appointment in March. The pharmacy says they have tried to request refills unsuccessful.

## 2022-02-25 RX ORDER — FAMOTIDINE 40 MG/1
TABLET, FILM COATED ORAL
Qty: 90 TABLET | Refills: 0 | Status: SHIPPED | OUTPATIENT
Start: 2022-02-25 | End: 2022-05-24

## 2022-03-08 ENCOUNTER — OFFICE VISIT (OUTPATIENT)
Dept: FAMILY MEDICINE CLINIC | Facility: CLINIC | Age: 72
End: 2022-03-08

## 2022-03-08 VITALS
HEIGHT: 64 IN | OXYGEN SATURATION: 97 % | DIASTOLIC BLOOD PRESSURE: 78 MMHG | WEIGHT: 242.6 LBS | TEMPERATURE: 99.3 F | SYSTOLIC BLOOD PRESSURE: 132 MMHG | HEART RATE: 77 BPM | BODY MASS INDEX: 41.42 KG/M2

## 2022-03-08 DIAGNOSIS — R42 DIZZINESS ON STANDING: ICD-10-CM

## 2022-03-08 DIAGNOSIS — E11.65 TYPE 2 DIABETES MELLITUS WITH HYPERGLYCEMIA, WITH LONG-TERM CURRENT USE OF INSULIN: ICD-10-CM

## 2022-03-08 DIAGNOSIS — I10 ESSENTIAL HYPERTENSION: ICD-10-CM

## 2022-03-08 DIAGNOSIS — R26.81 UNSTEADY GAIT: ICD-10-CM

## 2022-03-08 DIAGNOSIS — R42 VERTIGO: ICD-10-CM

## 2022-03-08 DIAGNOSIS — Z11.59 NEED FOR HEPATITIS C SCREENING TEST: ICD-10-CM

## 2022-03-08 DIAGNOSIS — E53.8 VITAMIN B12 DEFICIENCY: ICD-10-CM

## 2022-03-08 DIAGNOSIS — Z12.31 ENCOUNTER FOR SCREENING MAMMOGRAM FOR MALIGNANT NEOPLASM OF BREAST: ICD-10-CM

## 2022-03-08 DIAGNOSIS — Z23 NEED FOR TDAP VACCINATION: ICD-10-CM

## 2022-03-08 DIAGNOSIS — R29.6 FREQUENT FALLS: ICD-10-CM

## 2022-03-08 DIAGNOSIS — Z79.4 TYPE 2 DIABETES MELLITUS WITH HYPERGLYCEMIA, WITH LONG-TERM CURRENT USE OF INSULIN: ICD-10-CM

## 2022-03-08 DIAGNOSIS — Z00.00 ENCOUNTER FOR MEDICARE ANNUAL WELLNESS EXAM: Primary | ICD-10-CM

## 2022-03-08 DIAGNOSIS — E55.9 VITAMIN D DEFICIENCY: ICD-10-CM

## 2022-03-08 LAB
25(OH)D3 SERPL-MCNC: 43.9 NG/ML (ref 30–100)
ALBUMIN SERPL-MCNC: 4.3 G/DL (ref 3.5–5.2)
ALBUMIN/GLOB SERPL: 1.3 G/DL
ALP SERPL-CCNC: 106 U/L (ref 39–117)
ALT SERPL W P-5'-P-CCNC: 29 U/L (ref 1–33)
ANION GAP SERPL CALCULATED.3IONS-SCNC: 11.8 MMOL/L (ref 5–15)
AST SERPL-CCNC: 29 U/L (ref 1–32)
BASOPHILS # BLD AUTO: 0.04 10*3/MM3 (ref 0–0.2)
BASOPHILS NFR BLD AUTO: 0.6 % (ref 0–1.5)
BILIRUB SERPL-MCNC: 0.4 MG/DL (ref 0–1.2)
BUN SERPL-MCNC: 19 MG/DL (ref 8–23)
BUN/CREAT SERPL: 14 (ref 7–25)
CALCIUM SPEC-SCNC: 9.9 MG/DL (ref 8.6–10.5)
CHLORIDE SERPL-SCNC: 103 MMOL/L (ref 98–107)
CHOLEST SERPL-MCNC: 116 MG/DL (ref 0–200)
CO2 SERPL-SCNC: 25.2 MMOL/L (ref 22–29)
CREAT SERPL-MCNC: 1.36 MG/DL (ref 0.57–1)
DEPRECATED RDW RBC AUTO: 43.5 FL (ref 37–54)
EGFRCR SERPLBLD CKD-EPI 2021: 41 ML/MIN/1.73
EOSINOPHIL # BLD AUTO: 0.1 10*3/MM3 (ref 0–0.4)
EOSINOPHIL NFR BLD AUTO: 1.4 % (ref 0.3–6.2)
ERYTHROCYTE [DISTWIDTH] IN BLOOD BY AUTOMATED COUNT: 12.3 % (ref 12.3–15.4)
FOLATE SERPL-MCNC: 7.82 NG/ML (ref 4.78–24.2)
GLOBULIN UR ELPH-MCNC: 3.4 GM/DL
GLUCOSE SERPL-MCNC: 212 MG/DL (ref 65–99)
HBA1C MFR BLD: 8.1 % (ref 4.8–5.6)
HCT VFR BLD AUTO: 46.5 % (ref 34–46.6)
HCV AB SER DONR QL: NORMAL
HDLC SERPL-MCNC: 37 MG/DL (ref 40–60)
HGB BLD-MCNC: 15.3 G/DL (ref 12–15.9)
IMM GRANULOCYTES # BLD AUTO: 0.02 10*3/MM3 (ref 0–0.05)
IMM GRANULOCYTES NFR BLD AUTO: 0.3 % (ref 0–0.5)
LDLC SERPL CALC-MCNC: 47 MG/DL (ref 0–100)
LDLC/HDLC SERPL: 1.07 {RATIO}
LYMPHOCYTES # BLD AUTO: 2.7 10*3/MM3 (ref 0.7–3.1)
LYMPHOCYTES NFR BLD AUTO: 39.1 % (ref 19.6–45.3)
MCH RBC QN AUTO: 31.3 PG (ref 26.6–33)
MCHC RBC AUTO-ENTMCNC: 32.9 G/DL (ref 31.5–35.7)
MCV RBC AUTO: 95.1 FL (ref 79–97)
MONOCYTES # BLD AUTO: 0.38 10*3/MM3 (ref 0.1–0.9)
MONOCYTES NFR BLD AUTO: 5.5 % (ref 5–12)
NEUTROPHILS NFR BLD AUTO: 3.67 10*3/MM3 (ref 1.7–7)
NEUTROPHILS NFR BLD AUTO: 53.1 % (ref 42.7–76)
NRBC BLD AUTO-RTO: 0 /100 WBC (ref 0–0.2)
PLATELET # BLD AUTO: 178 10*3/MM3 (ref 140–450)
PMV BLD AUTO: 12.2 FL (ref 6–12)
POTASSIUM SERPL-SCNC: 4.6 MMOL/L (ref 3.5–5.2)
PROT SERPL-MCNC: 7.7 G/DL (ref 6–8.5)
RBC # BLD AUTO: 4.89 10*6/MM3 (ref 3.77–5.28)
SODIUM SERPL-SCNC: 140 MMOL/L (ref 136–145)
TRIGL SERPL-MCNC: 197 MG/DL (ref 0–150)
VIT B12 BLD-MCNC: 1338 PG/ML (ref 211–946)
VLDLC SERPL-MCNC: 32 MG/DL (ref 5–40)
WBC NRBC COR # BLD: 6.91 10*3/MM3 (ref 3.4–10.8)

## 2022-03-08 PROCEDURE — 96160 PT-FOCUSED HLTH RISK ASSMT: CPT | Performed by: NURSE PRACTITIONER

## 2022-03-08 PROCEDURE — 99397 PER PM REEVAL EST PAT 65+ YR: CPT | Performed by: NURSE PRACTITIONER

## 2022-03-08 PROCEDURE — 80061 LIPID PANEL: CPT | Performed by: NURSE PRACTITIONER

## 2022-03-08 PROCEDURE — 1170F FXNL STATUS ASSESSED: CPT | Performed by: NURSE PRACTITIONER

## 2022-03-08 PROCEDURE — 1126F AMNT PAIN NOTED NONE PRSNT: CPT | Performed by: NURSE PRACTITIONER

## 2022-03-08 PROCEDURE — 82306 VITAMIN D 25 HYDROXY: CPT | Performed by: NURSE PRACTITIONER

## 2022-03-08 PROCEDURE — 83036 HEMOGLOBIN GLYCOSYLATED A1C: CPT | Performed by: NURSE PRACTITIONER

## 2022-03-08 PROCEDURE — 82607 VITAMIN B-12: CPT | Performed by: NURSE PRACTITIONER

## 2022-03-08 PROCEDURE — G0439 PPPS, SUBSEQ VISIT: HCPCS | Performed by: NURSE PRACTITIONER

## 2022-03-08 PROCEDURE — 82746 ASSAY OF FOLIC ACID SERUM: CPT | Performed by: NURSE PRACTITIONER

## 2022-03-08 PROCEDURE — 85025 COMPLETE CBC W/AUTO DIFF WBC: CPT | Performed by: NURSE PRACTITIONER

## 2022-03-08 PROCEDURE — 86803 HEPATITIS C AB TEST: CPT | Performed by: NURSE PRACTITIONER

## 2022-03-08 PROCEDURE — 36415 COLL VENOUS BLD VENIPUNCTURE: CPT | Performed by: NURSE PRACTITIONER

## 2022-03-08 PROCEDURE — 99214 OFFICE O/P EST MOD 30 MIN: CPT | Performed by: NURSE PRACTITIONER

## 2022-03-08 PROCEDURE — 1159F MED LIST DOCD IN RCRD: CPT | Performed by: NURSE PRACTITIONER

## 2022-03-08 PROCEDURE — 80053 COMPREHEN METABOLIC PANEL: CPT | Performed by: NURSE PRACTITIONER

## 2022-03-08 RX ORDER — DIPHENHYDRAMINE HCL 25 MG
25 TABLET ORAL EVERY 6 HOURS PRN
COMMUNITY
End: 2022-06-14

## 2022-03-08 RX ORDER — AMLODIPINE BESYLATE 5 MG/1
5 TABLET ORAL DAILY
Qty: 90 TABLET | Refills: 1 | Status: SHIPPED | OUTPATIENT
Start: 2022-03-08 | End: 2022-06-14 | Stop reason: SDUPTHER

## 2022-03-08 RX ORDER — BENAZEPRIL HYDROCHLORIDE 40 MG/1
TABLET, FILM COATED ORAL
COMMUNITY
Start: 2021-12-07 | End: 2022-03-08 | Stop reason: SDUPTHER

## 2022-03-08 RX ORDER — BENAZEPRIL HYDROCHLORIDE 20 MG/1
20 TABLET ORAL DAILY
Qty: 90 TABLET | Refills: 1 | Status: SHIPPED | OUTPATIENT
Start: 2022-03-08 | End: 2022-06-14 | Stop reason: SDUPTHER

## 2022-03-08 RX ORDER — MECLIZINE HCL 25MG 25 MG/1
TABLET, CHEWABLE ORAL
COMMUNITY
Start: 2022-03-04

## 2022-03-08 RX ORDER — ONDANSETRON 4 MG/1
TABLET, ORALLY DISINTEGRATING ORAL
COMMUNITY
Start: 2022-03-04

## 2022-03-08 NOTE — ASSESSMENT & PLAN NOTE
Diabetes is improving with treatment.   Continue current treatment regimen.  Dietary recommendations for ADA diet.  We will check an A1c today.  Diabetes will be reassessed in 3 months.

## 2022-03-08 NOTE — PROGRESS NOTES
The ABCs of the Annual Wellness Visit  Subsequent Medicare Wellness Visit    Chief Complaint   Patient presents with   • medicare annual wellness exam      Subjective    History of Present Illness:  Jane Huang is a 72 y.o. female who presents for a Subsequent Medicare Wellness Visit.  She is also presenting for a 3-month follow-up on diabetes.    She is complaining of dizziness.  She states that she went to the urgent care last Friday.  She states they told her she had dehydration and that her ears were full.  She states that she has dizziness especially when she turns her head to the left and when she gets up from a sitting position.  Her daughter states that she is very unsteady on her feet and has fallen a few times since then.  She states prior to the dizzy spells she had a sharp pain in her eye with a blood vessel.  She states they gave her Benadryl, Antivert and Zofran.  She does not have a walker and is unsteady on her feet.    Diabetes type 2, insulin-dependent: Her last A1c was 8%.  She states that her blood sugars have improved, states usually  fasting and around 180 postprandial.    Hypertension: Blood pressure is stable 132/78.  She did state that they did orthostatic blood pressures at urgent care and that it did change by 20 points from sitting to standing.  She does not follow with a cardiologist.  She is currently on benazepril 20 mg daily and amlodipine 5 mg daily.    The following portions of the patient's history were reviewed and   updated as appropriate: allergies, current medications, past family history, past medical history, past social history, past surgical history and problem list.    Compared to one year ago, the patient feels her physical   health is the same.    Compared to one year ago, the patient feels her mental   health is the same.    Recent Hospitalizations:  She was not admitted to the hospital during the last year.       Current Medical Providers:  Patient Care  Team:  Chrissy Collins APRN as PCP - General (Nurse Practitioner)    Outpatient Medications Prior to Visit   Medication Sig Dispense Refill   • atorvastatin (LIPITOR) 10 MG tablet TAKE 1 TABLET(10 MG) BY MOUTH EVERY DAY AT BEDTIME 90 tablet 1   • B-D ULTRAFINE III SHORT PEN 31G X 8 MM misc USE AS DIRECTED 100 each 2   • baclofen (LIORESAL) 10 MG tablet Take 10 mg by mouth 3 (Three) Times a Day.     • Calcium Carbonate 1500 (600 Ca) MG tablet Take 1 tablet by mouth 2 (two) times a day.     • cetirizine (zyrTEC) 10 MG tablet Take 1 tablet by mouth Daily.     • colestipol (COLESTID) 1 g tablet TAKE 1 TABLET BY MOUTH TWICE DAILY SWALLOWING WHOLE WITH ANY LIQUID. DO NOT CRUSH CHEW OR DIVIDE FOR 90 DAYS 180 tablet 1   • diphenhydrAMINE (BENADRYL) 25 MG tablet Take 25 mg by mouth Every 6 (Six) Hours As Needed for Itching.     • famotidine (PEPCID) 40 MG tablet TAKE 1 TABLET(40 MG) BY MOUTH EVERY DAY AT BEDTIME 90 tablet 0   • gabapentin (NEURONTIN) 300 MG capsule 300 mg 3 (Three) Times a Day As Needed (pain).     • glipizide (GLUCOTROL) 5 MG tablet TAKE 2 TABLETS BY MOUTH EVERY MORNING AND 1 TABLET BY MOUTH EVERY EVENING 270 tablet 1   • HYDROcodone-acetaminophen (NORCO)  MG per tablet Take 1 tablet by mouth Every 12 (Twelve) Hours As Needed.     • Lantus SoloStar 100 UNIT/ML injection pen Inject 70 Units under the skin into the appropriate area as directed Daily for 90 days. 21 pen 1   • meclizine 25 MG chewable tablet chewable tablet CHEW AND SWALLOW 1 TABLET BY MOUTH EVERY 6 HOURS AS NEEDED     • ondansetron ODT (ZOFRAN-ODT) 4 MG disintegrating tablet      • vitamin B-12 (CYANOCOBALAMIN) 1000 MCG tablet TAKE 1 TABLET BY MOUTH EVERY DAY 90 tablet 1   • vitamin D (ERGOCALCIFEROL) 1.25 MG (33175 UT) capsule capsule Take 50,000 Units by mouth Every 7 (Seven) Days.     • amLODIPine (NORVASC) 5 MG tablet Take 1 tablet by mouth Daily.     • benazepril (LOTENSIN) 20 MG tablet TAKE 1 TABLET BY MOUTH DAILY 90 tablet 1    • benazepril (LOTENSIN) 40 MG tablet        No facility-administered medications prior to visit.       Opioid medication/s are on active medication list.  and I have evaluated her active treatment plan and pain score trends (see table).  Vitals:    03/08/22 1438   PainSc: 0-No pain     I have reviewed the chart for potential of high risk medication and harmful drug interactions in the elderly.          Aspirin is not on active medication list.  Aspirin use is not indicated based on review of current medical condition/s. Risk of harm outweighs potential benefits.  .    Patient Active Problem List   Diagnosis   • Arthritis   • Broken bones   • Cataracts, bilateral   • Depression   • Diabetes mellitus, type 2 (HCC)   • Diverticulitis   • Essential tremor   • Gall stones   • GERD (gastroesophageal reflux disease)   • Essential hypertension   • Indeterminate colitis   • Insomnia, unspecified   • Kidney stones   • Toe deformity   • Vitamin B12 deficiency   • Vitamin D deficiency   • Actinic keratosis   • Chronic pain disorder   • Degeneration of lumbar intervertebral disc   • Inflammation of sacroiliac joint (HCC)   • Lumbar spondylosis   • Dizziness on standing   • Unsteady gait     Advance Care Planning  Advance Directive is not on file.  ACP discussion was declined by the patient. Patient does not have an advance directive, declines further assistance.    Review of Systems   Constitutional: Negative for appetite change, chills and fever.   HENT: Negative for ear discharge, ear pain, postnasal drip and rhinorrhea.    Respiratory: Negative for cough, shortness of breath and wheezing.    Cardiovascular: Negative for chest pain and palpitations.   Genitourinary: Negative for difficulty urinating and urgency.   Neurological: Positive for dizziness. Negative for weakness and confusion.   Psychiatric/Behavioral: Negative for suicidal ideas. The patient is not nervous/anxious.         Objective    Vitals:    03/08/22 1438  "  BP: 132/78   Pulse: 77   Temp: 99.3 °F (37.4 °C)   SpO2: 97%   Weight: 110 kg (242 lb 9.6 oz)   Height: 162.6 cm (64\")   PainSc: 0-No pain     BMI Readings from Last 1 Encounters:   03/08/22 41.64 kg/m²   BMI is above normal parameters. Recommendations include: nutrition counseling    Does the patient have evidence of cognitive impairment? No    Physical Exam  Vitals reviewed.   Constitutional:       Appearance: Normal appearance. She is well-developed.   HENT:      Right Ear: Tympanic membrane, ear canal and external ear normal.      Left Ear: Tympanic membrane, ear canal and external ear normal.      Mouth/Throat:      Mouth: Mucous membranes are moist.      Pharynx: No pharyngeal swelling, oropharyngeal exudate or posterior oropharyngeal erythema.   Neck:      Thyroid: No thyroid mass, thyromegaly or thyroid tenderness.   Cardiovascular:      Rate and Rhythm: Normal rate and regular rhythm.      Heart sounds: No murmur heard.    No friction rub. No gallop.   Pulmonary:      Effort: Pulmonary effort is normal.      Breath sounds: Normal breath sounds. No wheezing or rhonchi.   Lymphadenopathy:      Cervical: No cervical adenopathy.   Skin:     General: Skin is warm and dry.   Neurological:      Mental Status: She is alert and oriented to person, place, and time.      Cranial Nerves: No cranial nerve deficit.      Gait: Gait abnormal.      Comments: Unsteady gait noted.   Psychiatric:         Mood and Affect: Mood and affect normal.         Behavior: Behavior normal.         Thought Content: Thought content normal. Thought content does not include homicidal or suicidal ideation.         Judgment: Judgment normal.                 HEALTH RISK ASSESSMENT    Smoking Status:  Social History     Tobacco Use   Smoking Status Never Smoker   Smokeless Tobacco Never Used     Alcohol Consumption:  Social History     Substance and Sexual Activity   Alcohol Use Never     [facility] Risk Screen:    [facility], and patient is at HIGH risk for falls. Assessment completed on:3/8/2022    Depression Screening:  No flowsheet data found.    Health Habits and Functional and Cognitive Screening:  Functional & Cognitive Status 3/8/2022   Do you have difficulty preparing food and eating? No   Do you have difficulty bathing yourself, getting dressed or grooming yourself? No   Do you have difficulty using the toilet? No   Do you have difficulty moving around from place to place? No   Do you have trouble with steps or getting out of a bed or a chair? Yes   Current Diet Well Balanced Diet   Dental Exam Up to date   Eye Exam Up to date   Exercise (times per week) 2 times per week   Current Exercises Include Walking   Do you need help using the phone?  No   Are you deaf or do you have serious difficulty hearing?  Yes   Do you need help with transportation? No   Do you need help shopping? No   Do you need help preparing meals?  No   Do you need help with housework?  No   Do you need help with laundry? No   Do you need help taking your medications? Yes   Do you need help managing money? No   Do you ever drive or ride in a car without wearing a seat belt? No   Have you felt unusual stress, anger or loneliness in the last month? No   Who do you live with? Other   If you need help, do you have trouble finding someone available to you? No   Have you been bothered in the last four weeks by sexual problems? No   Do you have difficulty concentrating, remembering or making decisions? Yes       Age-appropriate Screening Schedule:  Refer to the list below for future screening recommendations based on patient's age, sex and/or medical conditions. Orders for these recommended tests are listed in the plan section. The patient has been provided with a written plan.    Health Maintenance   Topic Date Due   • TDAP/TD VACCINES (1 - Tdap) Never done   • DIABETIC FOOT EXAM  Never done   • ZOSTER VACCINE (1 of 2) 03/08/2023 (Originally 1/16/1998)    • DIABETIC EYE EXAM  05/12/2022   • URINE MICROALBUMIN  06/02/2022   • HEMOGLOBIN A1C  06/07/2022   • DXA SCAN  12/09/2022   • MAMMOGRAM  01/05/2023   • INFLUENZA VACCINE  Completed              Assessment/Plan   CMS Preventative Services Quick Reference  Risk Factors Identified During Encounter  Fall Risk-High or Moderate  Immunizations Discussed/Encouraged (specific Immunizations; Tdap  Obesity/Overweight   Polypharmacy  The above risks/problems have been discussed with the patient.  Follow up actions/plans if indicated are seen below in the Assessment/Plan Section.  Pertinent information has been shared with the patient in the After Visit Summary.    Diagnoses and all orders for this visit:    1. Encounter for Medicare annual wellness exam (Primary)    2. Frequent falls  Comments:  Advised to change positions slowly to help prevent falls, advised to use the walker as prescribed.  Orders:  -     Walker    3. Vertigo  Comments:  I discussed with her that if vertigo continues, may want to refer her to physical therapy.    4. Type 2 diabetes mellitus with hyperglycemia, with long-term current use of insulin (HCC)  Assessment & Plan:  Diabetes is improving with treatment.   Continue current treatment regimen.  Dietary recommendations for ADA diet.  We will check an A1c today.  Diabetes will be reassessed in 3 months.    Orders:  -     Hemoglobin A1c  -     Comprehensive Metabolic Panel  -     Lipid Panel  -     CBC Auto Differential    5. Vitamin D deficiency  -     Vitamin D 25 Hydroxy    6. Need for hepatitis C screening test  -     Hepatitis C Antibody    7. Vitamin B12 deficiency  -     CBC Auto Differential  -     Vitamin B12 & Folate    8. Need for Tdap vaccination    9. Encounter for screening mammogram for malignant neoplasm of breast  -     Mammo Screening Digital Tomosynthesis Bilateral With CAD; Future    10. Essential hypertension  Assessment & Plan:  Hypertension is improving with treatment.  Continue  current treatment regimen.  Continue current medications.  Blood pressure will be reassessed at the next regular appointment.    Orders:  -     Ambulatory Referral to Cardiology    11. Dizziness on standing  Comments:  Due to her dizziness, I am going to refer her to cardiology for further evaluation.  Orders:  -     Ambulatory Referral to Cardiology    12. Unsteady gait  Comments:  Due to her unsteady gait, I am going to give her a prescription for a walker and advised her to use this.  Orders:  -     Walker    Other orders  -     Tdap (BOOSTRIX) 5-2.5-18.5 LF-MCG/0.5 injection; Inject 0.5 mL into the appropriate muscle as directed by prescriber 1 (One) Time for 1 dose.  Dispense: 0.5 mL; Refill: 0  -     amLODIPine (NORVASC) 5 MG tablet; Take 1 tablet by mouth Daily.  Dispense: 90 tablet; Refill: 1  -     benazepril (LOTENSIN) 20 MG tablet; Take 1 tablet by mouth Daily.  Dispense: 90 tablet; Refill: 1      Follow Up:   Return in about 3 months (around 6/8/2022) for Next scheduled follow up.     An After Visit Summary and PPPS were made available to the patient.

## 2022-03-29 ENCOUNTER — APPOINTMENT (OUTPATIENT)
Dept: NUTRITION | Facility: HOSPITAL | Age: 72
End: 2022-03-29

## 2022-03-30 RX ORDER — ERGOCALCIFEROL 1.25 MG/1
CAPSULE ORAL
Qty: 4 CAPSULE | Refills: 2 | Status: SHIPPED | OUTPATIENT
Start: 2022-03-30 | End: 2022-05-26

## 2022-05-24 RX ORDER — FAMOTIDINE 40 MG/1
TABLET, FILM COATED ORAL
Qty: 90 TABLET | Refills: 0 | Status: SHIPPED | OUTPATIENT
Start: 2022-05-24 | End: 2022-08-30 | Stop reason: SDUPTHER

## 2022-05-26 RX ORDER — ERGOCALCIFEROL 1.25 MG/1
CAPSULE ORAL
Qty: 4 CAPSULE | Refills: 2 | Status: SHIPPED | OUTPATIENT
Start: 2022-05-26 | End: 2022-09-15 | Stop reason: SDUPTHER

## 2022-06-14 ENCOUNTER — OFFICE VISIT (OUTPATIENT)
Dept: FAMILY MEDICINE CLINIC | Facility: CLINIC | Age: 72
End: 2022-06-14

## 2022-06-14 VITALS
RESPIRATION RATE: 20 BRPM | BODY MASS INDEX: 42.12 KG/M2 | DIASTOLIC BLOOD PRESSURE: 76 MMHG | HEIGHT: 63 IN | WEIGHT: 237.7 LBS | SYSTOLIC BLOOD PRESSURE: 126 MMHG

## 2022-06-14 DIAGNOSIS — E11.65 TYPE 2 DIABETES MELLITUS WITH HYPERGLYCEMIA, WITH LONG-TERM CURRENT USE OF INSULIN: Primary | ICD-10-CM

## 2022-06-14 DIAGNOSIS — R42 VERTIGO: ICD-10-CM

## 2022-06-14 DIAGNOSIS — I10 ESSENTIAL HYPERTENSION: ICD-10-CM

## 2022-06-14 DIAGNOSIS — Z79.4 TYPE 2 DIABETES MELLITUS WITH HYPERGLYCEMIA, WITH LONG-TERM CURRENT USE OF INSULIN: Primary | ICD-10-CM

## 2022-06-14 LAB
ALBUMIN SERPL-MCNC: 4 G/DL (ref 3.5–5.2)
ALBUMIN UR-MCNC: 1.2 MG/DL
ALBUMIN/GLOB SERPL: 1.2 G/DL
ALP SERPL-CCNC: 84 U/L (ref 39–117)
ALT SERPL W P-5'-P-CCNC: 30 U/L (ref 1–33)
ANION GAP SERPL CALCULATED.3IONS-SCNC: 11.4 MMOL/L (ref 5–15)
AST SERPL-CCNC: 40 U/L (ref 1–32)
BILIRUB SERPL-MCNC: 0.4 MG/DL (ref 0–1.2)
BUN SERPL-MCNC: 19 MG/DL (ref 8–23)
BUN/CREAT SERPL: 20.2 (ref 7–25)
CALCIUM SPEC-SCNC: 9.2 MG/DL (ref 8.6–10.5)
CHLORIDE SERPL-SCNC: 104 MMOL/L (ref 98–107)
CO2 SERPL-SCNC: 22.6 MMOL/L (ref 22–29)
CREAT SERPL-MCNC: 0.94 MG/DL (ref 0.57–1)
CREAT UR-MCNC: 205.6 MG/DL
EGFRCR SERPLBLD CKD-EPI 2021: 63.8 ML/MIN/1.73
GLOBULIN UR ELPH-MCNC: 3.3 GM/DL
GLUCOSE SERPL-MCNC: 123 MG/DL (ref 65–99)
HBA1C MFR BLD: 8.8 % (ref 4.8–5.6)
MICROALBUMIN/CREAT UR: 5.8 MG/G
POTASSIUM SERPL-SCNC: 4.2 MMOL/L (ref 3.5–5.2)
PROT SERPL-MCNC: 7.3 G/DL (ref 6–8.5)
SODIUM SERPL-SCNC: 138 MMOL/L (ref 136–145)

## 2022-06-14 PROCEDURE — 82043 UR ALBUMIN QUANTITATIVE: CPT | Performed by: NURSE PRACTITIONER

## 2022-06-14 PROCEDURE — 83036 HEMOGLOBIN GLYCOSYLATED A1C: CPT | Performed by: NURSE PRACTITIONER

## 2022-06-14 PROCEDURE — 36415 COLL VENOUS BLD VENIPUNCTURE: CPT | Performed by: NURSE PRACTITIONER

## 2022-06-14 PROCEDURE — 80053 COMPREHEN METABOLIC PANEL: CPT | Performed by: NURSE PRACTITIONER

## 2022-06-14 PROCEDURE — 82570 ASSAY OF URINE CREATININE: CPT | Performed by: NURSE PRACTITIONER

## 2022-06-14 PROCEDURE — 99214 OFFICE O/P EST MOD 30 MIN: CPT | Performed by: NURSE PRACTITIONER

## 2022-06-14 RX ORDER — BENAZEPRIL HYDROCHLORIDE 20 MG/1
20 TABLET ORAL DAILY
Qty: 90 TABLET | Refills: 1 | Status: SHIPPED | OUTPATIENT
Start: 2022-06-14 | End: 2022-12-09 | Stop reason: SDUPTHER

## 2022-06-14 RX ORDER — AMLODIPINE BESYLATE 5 MG/1
5 TABLET ORAL DAILY
Qty: 90 TABLET | Refills: 1 | Status: SHIPPED | OUTPATIENT
Start: 2022-06-14 | End: 2022-12-09 | Stop reason: SDUPTHER

## 2022-06-14 NOTE — PATIENT INSTRUCTIONS
Diabetes Mellitus and Nutrition, Adult  When you have diabetes, or diabetes mellitus, it is very important to have healthy eating habits because your blood sugar (glucose) levels are greatly affected by what you eat and drink. Eating healthy foods in the right amounts, at about the same times every day, can help you:  Control your blood glucose.  Lower your risk of heart disease.  Improve your blood pressure.  Reach or maintain a healthy weight.  What can affect my meal plan?  Every person with diabetes is different, and each person has different needs for a meal plan. Your health care provider may recommend that you work with a dietitian to make a meal plan that is best for you. Your meal plan may vary depending on factors such as:  The calories you need.  The medicines you take.  Your weight.  Your blood glucose, blood pressure, and cholesterol levels.  Your activity level.  Other health conditions you have, such as heart or kidney disease.  How do carbohydrates affect me?  Carbohydrates, also called carbs, affect your blood glucose level more than any other type of food. Eating carbs naturally raises the amount of glucose in your blood. Carb counting is a method for keeping track of how many carbs you eat. Counting carbs is important to keep your blood glucose at a healthy level, especially if you use insulin or take certain oral diabetes medicines.  It is important to know how many carbs you can safely have in each meal. This is different for every person. Your dietitian can help you calculate how many carbs you should have at each meal and for each snack.  How does alcohol affect me?  Alcohol can cause a sudden decrease in blood glucose (hypoglycemia), especially if you use insulin or take certain oral diabetes medicines. Hypoglycemia can be a life-threatening condition. Symptoms of hypoglycemia, such as sleepiness, dizziness, and confusion, are similar to symptoms of having too much alcohol.  Do not drink  "alcohol if:  Your health care provider tells you not to drink.  You are pregnant, may be pregnant, or are planning to become pregnant.  If you drink alcohol:  Do not drink on an empty stomach.  Limit how much you use to:  0-1 drink a day for women.  0-2 drinks a day for men.  Be aware of how much alcohol is in your drink. In the [place] one drink equals one 12 oz bottle of beer (355 mL), one 5 oz glass of wine (148 mL), or one 1½ oz glass of hard liquor (44 mL).  Keep yourself hydrated with water, diet soda, or unsweetened iced tea.  Keep in mind that regular soda, juice, and other mixers may contain a lot of sugar and must be counted as carbs.  What are tips for following this plan?    Reading food labels  Start by checking the serving size on the \"Nutrition Facts\" label of packaged foods and drinks. The amount of calories, carbs, fats, and other nutrients listed on the label is based on one serving of the item. Many items contain more than one serving per package.  Check the total grams (g) of carbs in one serving. You can calculate the number of servings of carbs in one serving by dividing the total carbs by 15. For example, if a food has 30 g of total carbs per serving, it would be equal to 2 servings of carbs.  Check the number of grams (g) of saturated fats and trans fats in one serving. Choose foods that have a low amount or none of these fats.  Check the number of milligrams (mg) of salt (sodium) in one serving. Most people should limit total sodium intake to less than 2,300 mg per day.  Always check the nutrition information of foods labeled as \"low-fat\" or \"nonfat.\" These foods may be higher in added sugar or refined carbs and should be avoided.  Talk to your dietitian to identify your daily goals for nutrients listed on the label.  Shopping  Avoid buying canned, pre-made, or processed foods. These foods tend to be high in fat, sodium, and added sugar.  Shop around the outside edge of the grocery store. This " is where you will most often find fresh fruits and vegetables, bulk grains, fresh meats, and fresh dairy.  Cooking  Use low-heat cooking methods, such as baking, instead of high-heat cooking methods like deep frying.  Cook using healthy oils, such as olive, canola, or sunflower oil.  Avoid cooking with butter, cream, or high-fat meats.  Meal planning  Eat meals and snacks regularly, preferably at the same times every day. Avoid going long periods of time without eating.  Eat foods that are high in fiber, such as fresh fruits, vegetables, beans, and whole grains. Talk with your dietitian about how many servings of carbs you can eat at each meal.  Eat 4-6 oz (112-168 g) of lean protein each day, such as lean meat, chicken, fish, eggs, or tofu. One ounce (oz) of lean protein is equal to:  1 oz (28 g) of meat, chicken, or fish.  1 egg.  ¼ cup (62 g) of tofu.  Eat some foods each day that contain healthy fats, such as avocado, nuts, seeds, and fish.  What foods should I eat?  Fruits  Berries. Apples. Oranges. Peaches. Apricots. Plums. Grapes. Crisman. Papaya. Pomegranate. Kiwi. Cherries.  Vegetables  Lettuce. Spinach. Leafy greens, including kale, chard, jose greens, and mustard greens. Beets. Cauliflower. Cabbage. Broccoli. Carrots. Green beans. Tomatoes. Peppers. Onions. Cucumbers. Ashley sprouts.  Grains  Whole grains, such as whole-wheat or whole-grain bread, crackers, tortillas, cereal, and pasta. Unsweetened oatmeal. Quinoa. Brown or wild rice.  Meats and other proteins  Seafood. Poultry without skin. Lean cuts of poultry and beef. Tofu. Nuts. Seeds.  Dairy  Low-fat or fat-free dairy products such as milk, yogurt, and cheese.  The items listed above may not be a complete list of foods and beverages you can eat. Contact a dietitian for more information.  What foods should I avoid?  Fruits  Fruits canned with syrup.  Vegetables  Canned vegetables. Frozen vegetables with butter or cream sauce.  Grains  Refined  white flour and flour products such as bread, pasta, snack foods, and cereals. Avoid all processed foods.  Meats and other proteins  Fatty cuts of meat. Poultry with skin. Breaded or fried meats. Processed meat. Avoid saturated fats.  Dairy  Full-fat yogurt, cheese, or milk.  Beverages  Sweetened drinks, such as soda or iced tea.  The items listed above may not be a complete list of foods and beverages you should avoid. Contact a dietitian for more information.  Questions to ask a health care provider  Do I need to meet with a diabetes educator?  Do I need to meet with a dietitian?  What number can I call if I have questions?  When are the best times to check my blood glucose?  Where to find more information:  American Diabetes Association: diabetes.org  Academy of Nutrition and Dietetics: www.eatright.org  National [place] of Diabetes and Digestive and Kidney Diseases: www.niddk.nih.gov  Association of Diabetes Care and Education Specialists: www.diabeteseducator.org  Summary  It is important to have healthy eating habits because your blood sugar (glucose) levels are greatly affected by what you eat and drink.  A healthy meal plan will help you control your blood glucose and maintain a healthy lifestyle.  Your health care provider may recommend that you work with a dietitian to make a meal plan that is best for you.  Keep in mind that carbohydrates (carbs) and alcohol have immediate effects on your blood glucose levels. It is important to count carbs and to use alcohol carefully.  This information is not intended to replace advice given to you by your health care provider. Make sure you discuss any questions you have with your health care provider.  Document Revised: 11/24/2020 Document Reviewed: 11/24/2020  Elsevier Patient Education © 2021 [employer]

## 2022-06-14 NOTE — PROGRESS NOTES
"Chief Complaint  Diabetes (Pt stated she is here to discuss diabetes with PCP. Pt als complaining of dizziness)    Subjective          Jane Huang, 72 y.o. female presents to [facility]  History of Present Illness   She presents today for a 3-month follow-up.    She continues to complain of dizziness, states it happens when she bends over or with quick movements of her head.  She states that she takes the meclizine as needed and it does help.  She denies any ear pain or sinus congestion.    Diabetes type 2, insulin-dependent: Her last A1c was 8.1%, she is on Lantus 70 units daily and glipizide 5 mg daily.  She was told to decrease her carbs and sugars in her diet.  She is due for urine microalbumin, diabetic eye exam and diabetic foot exam.  She does have chronic kidney disease, her last GFR was 41.  She was recently on prednisone 20 mg 2x/day for one week for her back pain.  She states her blood sugars have been as high as 200s in the afternoon.    Hyperlipidemia: Her last LDL level was 47 and at goal 3 months ago, currently on atorvastatin 10 mg every evening.  She is also on colestipol 1 g twice daily.    Vitamin D deficiency: Her vitamin D level did improve 3 months ago, she is on vitamin D 50,000 units weekly.    Hypertension: Blood pressure stable on amlodipine and benazepril as listed.    Objective   Vital Signs:   /76 (BP Location: Left arm, Patient Position: Sitting, Cuff Size: Large Adult)   Resp 20   Ht 160 cm (63\")   Wt 108 kg (237 lb 11.2 oz)   BMI 42.11 kg/m²     Current Outpatient Medications on File Prior to Visit   Medication Sig Dispense Refill   • atorvastatin (LIPITOR) 10 MG tablet TAKE 1 TABLET(10 MG) BY MOUTH EVERY DAY AT BEDTIME 90 tablet 1   • B-D ULTRAFINE III SHORT PEN 31G X 8 MM misc USE AS DIRECTED 100 each 2   • baclofen (LIORESAL) 10 MG tablet Take 10 mg by mouth 3 (Three) Times a Day.     • Calcium Carbonate 1500 (600 Ca) MG tablet Take 1 " tablet by mouth 2 (two) times a day.     • cetirizine (zyrTEC) 10 MG tablet Take 1 tablet by mouth Daily.     • colestipol (COLESTID) 1 g tablet TAKE 1 TABLET BY MOUTH TWICE DAILY SWALLOWING WHOLE WITH ANY LIQUID. DO NOT CRUSH CHEW OR DIVIDE FOR 90 DAYS 180 tablet 1   • famotidine (PEPCID) 40 MG tablet TAKE 1 TABLET(40 MG) BY MOUTH EVERY DAY AT BEDTIME 90 tablet 0   • gabapentin (NEURONTIN) 300 MG capsule 300 mg 3 (Three) Times a Day As Needed (pain).     • glipizide (GLUCOTROL) 5 MG tablet TAKE 2 TABLETS BY MOUTH EVERY MORNING AND 1 TABLET BY MOUTH EVERY EVENING 270 tablet 1   • HYDROcodone-acetaminophen (NORCO)  MG per tablet Take 1 tablet by mouth Every 12 (Twelve) Hours As Needed.     • meclizine 25 MG chewable tablet chewable tablet CHEW AND SWALLOW 1 TABLET BY MOUTH EVERY 6 HOURS AS NEEDED     • ondansetron ODT (ZOFRAN-ODT) 4 MG disintegrating tablet      • vitamin B-12 (CYANOCOBALAMIN) 1000 MCG tablet TAKE 1 TABLET BY MOUTH EVERY DAY 90 tablet 1   • vitamin D (ERGOCALCIFEROL) 1.25 MG (39298 UT) capsule capsule TAKE 1 CAPSULE BY MOUTH 1 TIME WEEKLY 4 capsule 2   • [DISCONTINUED] amLODIPine (NORVASC) 5 MG tablet Take 1 tablet by mouth Daily. 90 tablet 1   • [DISCONTINUED] benazepril (LOTENSIN) 20 MG tablet Take 1 tablet by mouth Daily. 90 tablet 1   • Lantus SoloStar 100 UNIT/ML injection pen Inject 70 Units under the skin into the appropriate area as directed Daily for 90 days. 21 pen 1   • [DISCONTINUED] diphenhydrAMINE (BENADRYL) 25 MG tablet Take 25 mg by mouth Every 6 (Six) Hours As Needed for Itching.       No current facility-administered medications on file prior to visit.     Past Medical History:   Diagnosis Date   • Acid reflux    • Allergies    • Arthritis    • Broken bones    • Cataracts, bilateral    • Chronic allergic rhinitis    • Colitis    • Depression 09/09/2020   • Diabetes (HCC)    • Diabetes mellitus, type 2 ([facility]) 06/11/2019   • Diverticulitis    • Encounter for screening breast  examination    • Essential hypertension 06/11/2019   • Essential tremor 06/11/2019   • Forgetfulness    • Gall stones    • GERD (gastroesophageal reflux disease) 06/02/2021   • High blood pressure    • HTN (hypertension), benign    • Insomnia, unspecified 09/09/2020   • Kidney stones    • Screening for colon cancer 03/18/2013   • Sinus trouble    • Toe deformity 03/02/2021   • Vitamin B12 deficiency 09/09/2020   • Vitamin D deficiency 12/09/2020      Physical Exam  Vitals reviewed.   Constitutional:       Appearance: Normal appearance. She is well-developed.   HENT:      Right Ear: Tympanic membrane, ear canal and external ear normal.      Left Ear: Tympanic membrane, ear canal and external ear normal.   Neck:      Thyroid: No thyroid mass, thyromegaly or thyroid tenderness.   Cardiovascular:      Rate and Rhythm: Normal rate and regular rhythm.      Heart sounds: No murmur heard.    No friction rub. No gallop.   Pulmonary:      Effort: Pulmonary effort is normal.      Breath sounds: Normal breath sounds. No wheezing or rhonchi.   Lymphadenopathy:      Cervical: No cervical adenopathy.   Skin:     General: Skin is warm and dry.   Neurological:      Mental Status: She is alert and oriented to person, place, and time.      Cranial Nerves: No cranial nerve deficit.   Psychiatric:         Mood and Affect: Mood and affect normal.         Behavior: Behavior normal.         Thought Content: Thought content normal. Thought content does not include homicidal or suicidal ideation.         Judgment: Judgment normal.        Result Review :     CMP    CMP 9/7/21 12/7/21 3/8/22   Glucose 176 (A) 167 (A) 212 (A)   BUN 16 15 19   Creatinine 1.15 (A) 0.91 1.36 (A)   eGFR Non  Am 46 (A) 61    Sodium 139 138 140   Potassium 4.5 4.3 4.6   Chloride 105 103 103   Calcium 9.4 9.1 9.9   Albumin 4.00 3.70 4.30   Total Bilirubin 0.4 0.3 0.4   Alkaline Phosphatase 79 76 106   AST (SGOT) 38 (A) 32 29   ALT (SGPT) 35 (A) 32 29   (A)  Abnormal value            CBC    CBC 9/7/21 3/8/22   WBC 6.35 6.91   RBC 4.78 4.89   Hemoglobin 14.8 15.3   Hematocrit 47.0 (A) 46.5   MCV 98.3 (A) 95.1   MCH 31.0 31.3   MCHC 31.5 32.9   RDW 12.6 12.3   Platelets 181 178   (A) Abnormal value            Lipid Panel    Lipid Panel 9/7/21 3/8/22   Total Cholesterol 116 116   Triglycerides 138 197 (A)   HDL Cholesterol 37 (A) 37 (A)   VLDL Cholesterol 24 32   LDL Cholesterol  55 47   LDL/HDL Ratio 1.39 1.07   (A) Abnormal value            TSH    TSH 9/7/21   TSH 2.650           A1C Last 3 Results    HGBA1C Last 3 Results 9/7/21 12/7/21 3/8/22   Hemoglobin A1C 8.00 (A) 8.02 (A) 8.10 (A)   (A) Abnormal value                          Assessment and Plan    Diagnoses and all orders for this visit:    1. Type 2 diabetes mellitus with hyperglycemia, with long-term current use of insulin (HCC) (Primary)  Assessment & Plan:  Diabetes is unchanged.   Continue current treatment regimen.  Dietary recommendations for ADA diet.  Reminded to get yearly retinal exam.   We will check an A1c today.  Diabetes will be reassessed in 3 months.    Orders:  -     Microalbumin / Creatinine Urine Ratio - Urine, Clean Catch  -     Comprehensive Metabolic Panel  -     Hemoglobin A1c    2. Essential hypertension  Assessment & Plan:  Hypertension is improving with treatment.  Continue current treatment regimen.  Continue current medications.  Blood pressure will be reassessed in 3 months.    Orders:  -     Comprehensive Metabolic Panel    3. Vertigo  Assessment & Plan:  We discussed different options, offered to refer to ENT or physical therapy.  Patient will try physical therapy first.    Orders:  -     Ambulatory Referral to Physical Therapy Evaluate and treat, Vestibular    Other orders  -     amLODIPine (NORVASC) 5 MG tablet; Take 1 tablet by mouth Daily.  Dispense: 90 tablet; Refill: 1  -     benazepril (LOTENSIN) 20 MG tablet; Take 1 tablet by mouth Daily.  Dispense: 90 tablet; Refill:  1      Follow Up   Return in about 3 months (around 9/14/2022) for 30 min apt for complex pt.  Patient was given instructions and counseling regarding her condition or for health maintenance advice. Please see specific information pulled into the AVS if appropriate.

## 2022-06-14 NOTE — ASSESSMENT & PLAN NOTE
We discussed different options, offered to refer to ENT or physical therapy.  Patient will try physical therapy first.

## 2022-06-14 NOTE — ASSESSMENT & PLAN NOTE
Diabetes is unchanged.   Continue current treatment regimen.  Dietary recommendations for ADA diet.  Reminded to get yearly retinal exam.   We will check an A1c today.  Diabetes will be reassessed in 3 months.

## 2022-06-20 RX ORDER — ATORVASTATIN CALCIUM 10 MG/1
TABLET, FILM COATED ORAL
Qty: 90 TABLET | Refills: 1 | Status: SHIPPED | OUTPATIENT
Start: 2022-06-20 | End: 2022-12-09 | Stop reason: SDUPTHER

## 2022-06-24 ENCOUNTER — PATIENT ROUNDING (BHMG ONLY) (OUTPATIENT)
Dept: FAMILY MEDICINE CLINIC | Facility: CLINIC | Age: 72
End: 2022-06-24

## 2022-06-24 NOTE — PROGRESS NOTES
June 24, 2022    Hello, may I speak with Jane Huang?    My name is Nury    No answer, LM     I am  with [facility]  [address] [place] [address] [phone].    Before we get started may I verify your date of birth? 1950    I am calling to officially welcome you to our practice and ask about your recent visit. Is this a good time to talk?     Tell me about your visit with us. What things went well?         We're always looking for ways to make our patients' experiences even better. Do you have recommendations on ways we may improve?      Overall were you satisfied with your first visit to our practice?        I appreciate you taking the time to speak with me today. Is there anything else I can do for you?       Thank you, and have a great day.

## 2022-07-27 RX ORDER — GLIPIZIDE 5 MG/1
TABLET ORAL
Qty: 270 TABLET | Refills: 1 | OUTPATIENT
Start: 2022-07-27

## 2022-07-29 RX ORDER — GLIPIZIDE 5 MG/1
TABLET ORAL
Qty: 270 TABLET | Refills: 1 | Status: SHIPPED | OUTPATIENT
Start: 2022-07-29 | End: 2023-01-13

## 2022-08-18 RX ORDER — INSULIN GLARGINE 100 [IU]/ML
70 INJECTION, SOLUTION SUBCUTANEOUS DAILY
Qty: 21 PEN | Refills: 1 | Status: SHIPPED | OUTPATIENT
Start: 2022-08-18 | End: 2022-11-16

## 2022-08-22 RX ORDER — FAMOTIDINE 40 MG/1
TABLET, FILM COATED ORAL
Qty: 90 TABLET | Refills: 0 | OUTPATIENT
Start: 2022-08-22

## 2022-08-25 DIAGNOSIS — Z12.31 ENCOUNTER FOR SCREENING MAMMOGRAM FOR MALIGNANT NEOPLASM OF BREAST: Primary | ICD-10-CM

## 2022-08-26 ENCOUNTER — TRANSCRIBE ORDERS (OUTPATIENT)
Dept: ADMINISTRATIVE | Facility: HOSPITAL | Age: 72
End: 2022-08-26

## 2022-08-26 DIAGNOSIS — Z12.31 BREAST CANCER SCREENING BY MAMMOGRAM: Primary | ICD-10-CM

## 2022-08-30 RX ORDER — FAMOTIDINE 40 MG/1
40 TABLET, FILM COATED ORAL 2 TIMES DAILY PRN
Qty: 90 TABLET | Refills: 0 | Status: SHIPPED | OUTPATIENT
Start: 2022-08-30 | End: 2022-09-15 | Stop reason: SDUPTHER

## 2022-08-31 NOTE — TELEPHONE ENCOUNTER
Spoke with [facility] they have received pepcid refill will be ready for patient to pickup today. Val patient daughter aware.

## 2022-09-02 RX ORDER — MONTELUKAST SODIUM 4 MG/1
TABLET, CHEWABLE ORAL
Qty: 180 TABLET | Refills: 1 | Status: SHIPPED | OUTPATIENT
Start: 2022-09-02 | End: 2023-03-09 | Stop reason: SDUPTHER

## 2022-09-15 ENCOUNTER — OFFICE VISIT (OUTPATIENT)
Dept: FAMILY MEDICINE CLINIC | Facility: CLINIC | Age: 72
End: 2022-09-15

## 2022-09-15 VITALS
TEMPERATURE: 96.4 F | BODY MASS INDEX: 42.28 KG/M2 | RESPIRATION RATE: 20 BRPM | WEIGHT: 238.6 LBS | SYSTOLIC BLOOD PRESSURE: 136 MMHG | HEIGHT: 63 IN | OXYGEN SATURATION: 98 % | HEART RATE: 79 BPM | DIASTOLIC BLOOD PRESSURE: 74 MMHG

## 2022-09-15 DIAGNOSIS — G51.32 CLONIC HEMIFACIAL SPASM OF MUSCLE OF LEFT SIDE OF FACE: ICD-10-CM

## 2022-09-15 DIAGNOSIS — E78.2 MIXED HYPERLIPIDEMIA: ICD-10-CM

## 2022-09-15 DIAGNOSIS — Z79.4 TYPE 2 DIABETES MELLITUS WITH HYPERGLYCEMIA, WITH LONG-TERM CURRENT USE OF INSULIN: Primary | ICD-10-CM

## 2022-09-15 DIAGNOSIS — I10 ESSENTIAL HYPERTENSION: ICD-10-CM

## 2022-09-15 DIAGNOSIS — E55.9 VITAMIN D DEFICIENCY: ICD-10-CM

## 2022-09-15 DIAGNOSIS — E11.65 TYPE 2 DIABETES MELLITUS WITH HYPERGLYCEMIA, WITH LONG-TERM CURRENT USE OF INSULIN: Primary | ICD-10-CM

## 2022-09-15 LAB
25(OH)D3 SERPL-MCNC: 43.1 NG/ML (ref 30–100)
ALBUMIN SERPL-MCNC: 4 G/DL (ref 3.5–5.2)
ALBUMIN/GLOB SERPL: 1.3 G/DL
ALP SERPL-CCNC: 72 U/L (ref 39–117)
ALT SERPL W P-5'-P-CCNC: 30 U/L (ref 1–33)
ANION GAP SERPL CALCULATED.3IONS-SCNC: 11 MMOL/L (ref 5–15)
AST SERPL-CCNC: 36 U/L (ref 1–32)
BILIRUB SERPL-MCNC: 0.3 MG/DL (ref 0–1.2)
BUN SERPL-MCNC: 15 MG/DL (ref 8–23)
BUN/CREAT SERPL: 15.3 (ref 7–25)
CALCIUM SPEC-SCNC: 9.3 MG/DL (ref 8.6–10.5)
CHLORIDE SERPL-SCNC: 103 MMOL/L (ref 98–107)
CHOLEST SERPL-MCNC: 108 MG/DL (ref 0–200)
CO2 SERPL-SCNC: 25 MMOL/L (ref 22–29)
CREAT SERPL-MCNC: 0.98 MG/DL (ref 0.57–1)
EGFRCR SERPLBLD CKD-EPI 2021: 60.7 ML/MIN/1.73
GLOBULIN UR ELPH-MCNC: 3.1 GM/DL
GLUCOSE SERPL-MCNC: 151 MG/DL (ref 65–99)
HBA1C MFR BLD: 7.8 % (ref 4.8–5.6)
HDLC SERPL-MCNC: 39 MG/DL (ref 40–60)
LDLC SERPL CALC-MCNC: 48 MG/DL (ref 0–100)
LDLC/HDLC SERPL: 1.17 {RATIO}
MAGNESIUM SERPL-MCNC: 1.9 MG/DL (ref 1.6–2.4)
PHOSPHATE SERPL-MCNC: 4.1 MG/DL (ref 2.5–4.5)
POTASSIUM SERPL-SCNC: 4.3 MMOL/L (ref 3.5–5.2)
PROT SERPL-MCNC: 7.1 G/DL (ref 6–8.5)
SODIUM SERPL-SCNC: 139 MMOL/L (ref 136–145)
T4 FREE SERPL-MCNC: 1.09 NG/DL (ref 0.93–1.7)
TRIGL SERPL-MCNC: 116 MG/DL (ref 0–150)
TSH SERPL DL<=0.05 MIU/L-ACNC: 2.06 UIU/ML (ref 0.27–4.2)
VLDLC SERPL-MCNC: 21 MG/DL (ref 5–40)

## 2022-09-15 PROCEDURE — 84100 ASSAY OF PHOSPHORUS: CPT | Performed by: NURSE PRACTITIONER

## 2022-09-15 PROCEDURE — 84443 ASSAY THYROID STIM HORMONE: CPT | Performed by: NURSE PRACTITIONER

## 2022-09-15 PROCEDURE — 83036 HEMOGLOBIN GLYCOSYLATED A1C: CPT | Performed by: NURSE PRACTITIONER

## 2022-09-15 PROCEDURE — 80061 LIPID PANEL: CPT | Performed by: NURSE PRACTITIONER

## 2022-09-15 PROCEDURE — 83735 ASSAY OF MAGNESIUM: CPT | Performed by: NURSE PRACTITIONER

## 2022-09-15 PROCEDURE — 80053 COMPREHEN METABOLIC PANEL: CPT | Performed by: NURSE PRACTITIONER

## 2022-09-15 PROCEDURE — 99214 OFFICE O/P EST MOD 30 MIN: CPT | Performed by: NURSE PRACTITIONER

## 2022-09-15 PROCEDURE — 84439 ASSAY OF FREE THYROXINE: CPT | Performed by: NURSE PRACTITIONER

## 2022-09-15 PROCEDURE — 82306 VITAMIN D 25 HYDROXY: CPT | Performed by: NURSE PRACTITIONER

## 2022-09-15 RX ORDER — ERGOCALCIFEROL 1.25 MG/1
50000 CAPSULE ORAL
Qty: 13 CAPSULE | Refills: 0 | Status: SHIPPED | OUTPATIENT
Start: 2022-09-15 | End: 2022-12-09

## 2022-09-15 RX ORDER — FAMOTIDINE 40 MG/1
40 TABLET, FILM COATED ORAL 2 TIMES DAILY PRN
Qty: 90 TABLET | Refills: 1 | Status: SHIPPED | OUTPATIENT
Start: 2022-09-15 | End: 2022-12-09 | Stop reason: SDUPTHER

## 2022-09-15 NOTE — ASSESSMENT & PLAN NOTE
Diabetes is Stable.   Continue current treatment regimen.  We will check an A1c today with her labs.  Diabetes will be reassessed in 3 months.

## 2022-09-15 NOTE — ASSESSMENT & PLAN NOTE
Lipid abnormalities are improving with treatment.  Pharmacotherapy as ordered.  We will check a lipid panel today.  Lipids will be reassessed in 6 months.

## 2022-09-15 NOTE — ASSESSMENT & PLAN NOTE
We will check her vitamin D level today.  She will continue vitamin D 50,000 units weekly at this time until further notice.

## 2022-09-15 NOTE — PROGRESS NOTES
"Chief Complaint  Hypertension    Subjective          Jane Huang, 72 y.o. female presents to [facility]  History of Present Illness   She presents today for 3-month follow-up.    Diabetes type 2, insulin-dependent: Her last A1c was 8.8% which was an increase.  She had previously been getting steroid injections in her back.  She states she thinks her blood sugars are better and actually having some hypoglycemic episodes.  Per patient: Blood glucose trend: fluctuating minimally  breakfast time: 6-7 am  breakfast glucose level:   High score: 140-180  Overall: 130-140    Other chronic illnesses include: Vitamin D deficiency, vitamin B12 deficiency, hypertension, hyperlipidemia, and essential tremor.    She states she is having occasional spasms on the left side of her face.  Her daughter states she had the symptoms previously when she had low potassium.  She denies any weakness, facial drooping or any other symptoms.    Objective   Vital Signs:   /74   Pulse 79   Temp 96.4 °F (35.8 °C)   Resp 20   Ht 160 cm (63\")   Wt 108 kg (238 lb 9.6 oz)   SpO2 98%   BMI 42.27 kg/m²     Current Outpatient Medications on File Prior to Visit   Medication Sig Dispense Refill   • amLODIPine (NORVASC) 5 MG tablet Take 1 tablet by mouth Daily. 90 tablet 1   • atorvastatin (LIPITOR) 10 MG tablet TAKE 1 TABLET(10 MG) BY MOUTH EVERY DAY AT BEDTIME 90 tablet 1   • B-D ULTRAFINE III SHORT PEN 31G X 8 MM misc USE AS DIRECTED 100 each 2   • baclofen (LIORESAL) 10 MG tablet Take 10 mg by mouth 3 (Three) Times a Day.     • benazepril (LOTENSIN) 20 MG tablet Take 1 tablet by mouth Daily. 90 tablet 1   • Calcium Carbonate 1500 (600 Ca) MG tablet Take 1 tablet by mouth 2 (two) times a day.     • cetirizine (zyrTEC) 10 MG tablet Take 1 tablet by mouth Daily.     • colestipol (COLESTID) 1 g tablet TAKE 1 TABLET BY MOUTH TWICE DAILY. SWALLOW WHOLE WITH ANY LIQUID. DO NOT CRUSH CHEW OR DIVIDE FOR 90 " DAYS 180 tablet 1   • gabapentin (NEURONTIN) 300 MG capsule 300 mg 3 (Three) Times a Day As Needed (pain).     • glipizide (GLUCOTROL) 5 MG tablet TAKE 2 TABLETS BY MOUTH EVERY MORNING AND 1 TABLET BY MOUTH EVERY EVENING 270 tablet 1   • HYDROcodone-acetaminophen (NORCO)  MG per tablet Take 1 tablet by mouth Every 12 (Twelve) Hours As Needed.     • Lantus SoloStar 100 UNIT/ML injection pen Inject 70 Units under the skin into the appropriate area as directed Daily for 90 days. 21 pen 1   • meclizine 25 MG chewable tablet chewable tablet CHEW AND SWALLOW 1 TABLET BY MOUTH EVERY 6 HOURS AS NEEDED     • ondansetron ODT (ZOFRAN-ODT) 4 MG disintegrating tablet      • vitamin B-12 (CYANOCOBALAMIN) 1000 MCG tablet TAKE 1 TABLET BY MOUTH EVERY DAY 90 tablet 1   • [DISCONTINUED] famotidine (PEPCID) 40 MG tablet Take 1 tablet by mouth 2 (Two) Times a Day As Needed for Heartburn. 90 tablet 0   • [DISCONTINUED] vitamin D (ERGOCALCIFEROL) 1.25 MG (75953 UT) capsule capsule TAKE 1 CAPSULE BY MOUTH 1 TIME WEEKLY 4 capsule 2     No current facility-administered medications on file prior to visit.     Past Medical History:   Diagnosis Date   • Acid reflux    • Allergies    • Arthritis    • Broken bones    • Cataracts, bilateral    • Chronic allergic rhinitis    • Colitis    • Depression 09/09/2020   • Diabetes (HCC)    • Diabetes mellitus, type 2 (HCC) 06/11/2019   • Diverticulitis    • Encounter for screening breast examination    • Essential hypertension 06/11/2019   • Essential tremor 06/11/2019   • Forgetfulness    • Gall stones    • GERD (gastroesophageal reflux disease) 06/02/2021   • High blood pressure    • HTN (hypertension), benign    • Insomnia, unspecified 09/09/2020   • Kidney stones    • Screening for colon cancer 03/18/2013   • Sinus trouble    • Toe deformity 03/02/2021   • Vitamin B12 deficiency 09/09/2020   • Vitamin D deficiency 12/09/2020      Physical Exam  Vitals reviewed.   Constitutional:        Appearance: Normal appearance. She is well-developed. She is morbidly obese.   Neck:      Thyroid: No thyroid mass, thyromegaly or thyroid tenderness.   Cardiovascular:      Rate and Rhythm: Normal rate and regular rhythm.      Heart sounds: No murmur heard.    No friction rub. No gallop.   Pulmonary:      Effort: Pulmonary effort is normal.      Breath sounds: Normal breath sounds. No wheezing or rhonchi.   Lymphadenopathy:      Cervical: No cervical adenopathy.   Skin:     General: Skin is warm and dry.   Neurological:      Mental Status: She is alert and oriented to person, place, and time.      Cranial Nerves: No cranial nerve deficit.   Psychiatric:         Mood and Affect: Mood and affect normal.         Behavior: Behavior normal.         Thought Content: Thought content normal. Thought content does not include homicidal or suicidal ideation.         Judgment: Judgment normal.        Result Review :     CMP    CMP 12/7/21 3/8/22 6/14/22   Glucose 167 (A) 212 (A) 123 (A)   BUN 15 19 19   Creatinine 0.91 1.36 (A) 0.94   eGFR Non African Am 61     Sodium 138 140 138   Potassium 4.3 4.6 4.2   Chloride 103 103 104   Calcium 9.1 9.9 9.2   Albumin 3.70 4.30 4.00   Total Bilirubin 0.3 0.4 0.4   Alkaline Phosphatase 76 106 84   AST (SGOT) 32 29 40 (A)   ALT (SGPT) 32 29 30   (A) Abnormal value            CBC    CBC 3/8/22   WBC 6.91   RBC 4.89   Hemoglobin 15.3   Hematocrit 46.5   MCV 95.1   MCH 31.3   MCHC 32.9   RDW 12.3   Platelets 178           Lipid Panel    Lipid Panel 3/8/22   Total Cholesterol 116   Triglycerides 197 (A)   HDL Cholesterol 37 (A)   VLDL Cholesterol 32   LDL Cholesterol  47   LDL/HDL Ratio 1.07   (A) Abnormal value                A1C Last 3 Results    HGBA1C Last 3 Results 12/7/21 3/8/22 6/14/22   Hemoglobin A1C 8.02 (A) 8.10 (A) 8.80 (A)   (A) Abnormal value            Microalbumin    Microalbumin 6/14/22   Microalbumin, Urine 1.2                     Assessment and Plan    Diagnoses and all  orders for this visit:    1. Type 2 diabetes mellitus with hyperglycemia, with long-term current use of insulin (HCC) (Primary)  Assessment & Plan:  Diabetes is Stable.   Continue current treatment regimen.  We will check an A1c today with her labs.  Diabetes will be reassessed in 3 months.    Orders:  -     Hemoglobin A1c  -     Comprehensive Metabolic Panel  -     Lipid Panel  -     TSH+Free T4    2. Vitamin D deficiency  Assessment & Plan:  We will check her vitamin D level today.  She will continue vitamin D 50,000 units weekly at this time until further notice.    Orders:  -     Vitamin D 25 Hydroxy    3. Essential hypertension  Assessment & Plan:  Hypertension is improving with treatment.  Continue current treatment regimen.  Continue current medications.  Blood pressure will be reassessed in 3 months.    Orders:  -     Comprehensive Metabolic Panel  -     Lipid Panel  -     TSH+Free T4    4. Mixed hyperlipidemia  Assessment & Plan:  Lipid abnormalities are improving with treatment.  Pharmacotherapy as ordered.  We will check a lipid panel today.  Lipids will be reassessed in 6 months.    Orders:  -     Comprehensive Metabolic Panel  -     Lipid Panel    5. 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  Dispense: 90 tablet; Refill: 1  -     vitamin D (ERGOCALCIFEROL) 1.25 MG (74612 UT) capsule capsule; Take 1 capsule by mouth Every 7 (Seven) Days.  Dispense: 13 capsule; Refill: 0      Follow Up   Return in about 3 months (around 12/15/2022) for Next scheduled follow up.  Patient was given instructions and counseling regarding her condition or for health maintenance advice. Please see specific information pulled into the AVS if appropriate.

## 2022-10-18 ENCOUNTER — HOSPITAL ENCOUNTER (OUTPATIENT)
Dept: MAMMOGRAPHY | Facility: HOSPITAL | Age: 72
Discharge: HOME | End: 2022-10-18
Admitting: NURSE PRACTITIONER

## 2022-10-18 DIAGNOSIS — Z12.31 BREAST CANCER SCREENING BY MAMMOGRAM: ICD-10-CM

## 2022-10-18 PROCEDURE — 77067 SCR MAMMO BI INCL CAD: CPT

## 2022-10-18 PROCEDURE — 77063 BREAST TOMOSYNTHESIS BI: CPT

## 2022-11-23 RX ORDER — FAMOTIDINE 40 MG/1
40 TABLET, FILM COATED ORAL 2 TIMES DAILY PRN
Qty: 90 TABLET | Refills: 1 | OUTPATIENT
Start: 2022-11-23

## 2022-11-28 RX ORDER — PEN NEEDLE, DIABETIC 31 GX5/16"
1 NEEDLE, DISPOSABLE MISCELLANEOUS SEE ADMIN INSTRUCTIONS
Qty: 100 EACH | Refills: 5 | Status: SHIPPED | OUTPATIENT
Start: 2022-11-28

## 2022-12-09 ENCOUNTER — OFFICE VISIT (OUTPATIENT)
Dept: FAMILY MEDICINE CLINIC | Facility: CLINIC | Age: 72
End: 2022-12-09

## 2022-12-09 VITALS
DIASTOLIC BLOOD PRESSURE: 80 MMHG | HEIGHT: 63 IN | TEMPERATURE: 97.5 F | WEIGHT: 238.8 LBS | OXYGEN SATURATION: 98 % | SYSTOLIC BLOOD PRESSURE: 140 MMHG | BODY MASS INDEX: 42.31 KG/M2 | HEART RATE: 80 BPM

## 2022-12-09 DIAGNOSIS — E55.9 VITAMIN D DEFICIENCY: ICD-10-CM

## 2022-12-09 DIAGNOSIS — Z78.0 POSTMENOPAUSAL: ICD-10-CM

## 2022-12-09 DIAGNOSIS — K21.9 GASTROESOPHAGEAL REFLUX DISEASE, UNSPECIFIED WHETHER ESOPHAGITIS PRESENT: ICD-10-CM

## 2022-12-09 DIAGNOSIS — E78.2 MIXED HYPERLIPIDEMIA: ICD-10-CM

## 2022-12-09 DIAGNOSIS — E11.65 TYPE 2 DIABETES MELLITUS WITH HYPERGLYCEMIA, WITH LONG-TERM CURRENT USE OF INSULIN: Primary | ICD-10-CM

## 2022-12-09 DIAGNOSIS — I10 ESSENTIAL HYPERTENSION: ICD-10-CM

## 2022-12-09 DIAGNOSIS — Z79.4 TYPE 2 DIABETES MELLITUS WITH HYPERGLYCEMIA, WITH LONG-TERM CURRENT USE OF INSULIN: Primary | ICD-10-CM

## 2022-12-09 LAB
ALBUMIN SERPL-MCNC: 3.7 G/DL (ref 3.5–5.2)
ALBUMIN/GLOB SERPL: 1.2 G/DL
ALP SERPL-CCNC: 96 U/L (ref 39–117)
ALT SERPL W P-5'-P-CCNC: 28 U/L (ref 1–33)
ANION GAP SERPL CALCULATED.3IONS-SCNC: 7 MMOL/L (ref 5–15)
AST SERPL-CCNC: 33 U/L (ref 1–32)
BILIRUB SERPL-MCNC: 0.3 MG/DL (ref 0–1.2)
BUN SERPL-MCNC: 19 MG/DL (ref 8–23)
BUN/CREAT SERPL: 19.2 (ref 7–25)
CALCIUM SPEC-SCNC: 9.8 MG/DL (ref 8.6–10.5)
CHLORIDE SERPL-SCNC: 104 MMOL/L (ref 98–107)
CO2 SERPL-SCNC: 27 MMOL/L (ref 22–29)
CREAT SERPL-MCNC: 0.99 MG/DL (ref 0.57–1)
EGFRCR SERPLBLD CKD-EPI 2021: 60 ML/MIN/1.73
GLOBULIN UR ELPH-MCNC: 3.2 GM/DL
GLUCOSE SERPL-MCNC: 120 MG/DL (ref 65–99)
HBA1C MFR BLD: 7.9 % (ref 4.8–5.6)
POTASSIUM SERPL-SCNC: 4.5 MMOL/L (ref 3.5–5.2)
PROT SERPL-MCNC: 6.9 G/DL (ref 6–8.5)
SODIUM SERPL-SCNC: 138 MMOL/L (ref 136–145)

## 2022-12-09 PROCEDURE — 80053 COMPREHEN METABOLIC PANEL: CPT | Performed by: NURSE PRACTITIONER

## 2022-12-09 PROCEDURE — 99214 OFFICE O/P EST MOD 30 MIN: CPT | Performed by: NURSE PRACTITIONER

## 2022-12-09 PROCEDURE — 83036 HEMOGLOBIN GLYCOSYLATED A1C: CPT | Performed by: NURSE PRACTITIONER

## 2022-12-09 RX ORDER — FAMOTIDINE 40 MG/1
40 TABLET, FILM COATED ORAL DAILY
Qty: 90 TABLET | Refills: 1 | Status: SHIPPED | OUTPATIENT
Start: 2022-12-09

## 2022-12-09 RX ORDER — ATORVASTATIN CALCIUM 10 MG/1
10 TABLET, FILM COATED ORAL NIGHTLY
Qty: 90 TABLET | Refills: 1 | Status: SHIPPED | OUTPATIENT
Start: 2022-12-09

## 2022-12-09 RX ORDER — MULTIVIT-MIN/IRON/FOLIC ACID/K 18-600-40
2000 CAPSULE ORAL DAILY
Qty: 90 CAPSULE | Refills: 3 | Status: SHIPPED | OUTPATIENT
Start: 2022-12-09 | End: 2023-03-21 | Stop reason: SDUPTHER

## 2022-12-09 RX ORDER — AMLODIPINE BESYLATE 5 MG/1
5 TABLET ORAL DAILY
Qty: 90 TABLET | Refills: 1 | Status: SHIPPED | OUTPATIENT
Start: 2022-12-09

## 2022-12-09 RX ORDER — BENAZEPRIL HYDROCHLORIDE 20 MG/1
20 TABLET ORAL DAILY
Qty: 90 TABLET | Refills: 1 | Status: SHIPPED | OUTPATIENT
Start: 2022-12-09

## 2022-12-09 NOTE — ASSESSMENT & PLAN NOTE
Diabetes is improving with treatment.   Continue current treatment regimen.  Reminded to bring in blood sugar diary at next visit.  Dietary recommendations for ADA diet.  A1c and CMP will be checked today.  Diabetes will be reassessed in 3 months.

## 2022-12-09 NOTE — PROGRESS NOTES
"Chief Complaint  Diabetes, Hypertension, and Hyperlipidemia    Subjective          Jane Huang, 72 y.o. female presents to [facility]  History of Present Illness   She presents today for 3-month follow-up.    Diabetes type 2, insulin-dependent: Her last A1c did improve to 7.8%.  She had previously been getting steroid injections in her back.  She states she thinks her blood sugars are better and actually having some hypoglycemic episodes. Patient states that her blood sugars do fluctuate between .    GERD: She needs a refill on her Pepcid, states she takes it every day.  She states she was having difficulty getting the refill from the pharmacy.    Vitamin D deficiency: She is currently taking vitamin D 50,000 units.  Her last vitamin D level was stable at 43.    Other chronic illnesses include: vitamin B12 deficiency, hypertension, hyperlipidemia, and essential tremor.    She is complaining of muscle spasm to the left side of the neck drawing up towards her face but states this is improving.    She is due for a bone density scan.  Her last bone density scan showed some osteopenia.  She is taking calcium and vitamin D.    Objective   Vital Signs:   /80   Pulse 80   Temp 97.5 °F (36.4 °C)   Ht 160 cm (63\")   Wt 108 kg (238 lb 12.8 oz)   SpO2 98%   BMI 42.30 kg/m²       Current Outpatient Medications:   •  amLODIPine (NORVASC) 5 MG tablet, Take 1 tablet by mouth Daily., Disp: 90 tablet, Rfl: 1  •  atorvastatin (LIPITOR) 10 MG tablet, Take 1 tablet by mouth Every Night., Disp: 90 tablet, Rfl: 1  •  baclofen (LIORESAL) 10 MG tablet, Take 10 mg by mouth 3 (Three) Times a Day., Disp: , Rfl:   •  benazepril (LOTENSIN) 20 MG tablet, Take 1 tablet by mouth Daily., Disp: 90 tablet, Rfl: 1  •  Calcium Carbonate 1500 (600 Ca) MG tablet, Take 1 tablet by mouth 2 (two) times a day., Disp: , Rfl:   •  cetirizine (zyrTEC) 10 MG tablet, Take 1 tablet by mouth Daily., Disp: , Rfl: "   •  colestipol (COLESTID) 1 g tablet, TAKE 1 TABLET BY MOUTH TWICE DAILY. SWALLOW WHOLE WITH ANY LIQUID. DO NOT CRUSH CHEW OR DIVIDE FOR 90 DAYS, Disp: 180 tablet, Rfl: 1  •  famotidine (PEPCID) 40 MG tablet, Take 1 tablet by mouth Daily., Disp: 90 tablet, Rfl: 1  •  gabapentin (NEURONTIN) 300 MG capsule, 300 mg 3 (Three) Times a Day As Needed (pain)., Disp: , Rfl:   •  glipizide (GLUCOTROL) 5 MG tablet, TAKE 2 TABLETS BY MOUTH EVERY MORNING AND 1 TABLET BY MOUTH EVERY EVENING, Disp: 270 tablet, Rfl: 1  •  HYDROcodone-acetaminophen (NORCO)  MG per tablet, Take 1 tablet by mouth Every 12 (Twelve) Hours As Needed., Disp: , Rfl:   •  Insulin Pen Needle (B-D ULTRAFINE III SHORT PEN) 31G X 8 MM misc, Inject 1 each under the skin into the appropriate area as directed See Admin Instructions., Disp: 100 each, Rfl: 5  •  meclizine 25 MG chewable tablet chewable tablet, CHEW AND SWALLOW 1 TABLET BY MOUTH EVERY 6 HOURS AS NEEDED, Disp: , Rfl:   •  ondansetron ODT (ZOFRAN-ODT) 4 MG disintegrating tablet, , Disp: , Rfl:   •  vitamin B-12 (CYANOCOBALAMIN) 1000 MCG tablet, TAKE 1 TABLET BY MOUTH EVERY DAY, Disp: 90 tablet, Rfl: 1  •  Lantus SoloStar 100 UNIT/ML injection pen, Inject 70 Units under the skin into the appropriate area as directed Daily for 90 days., Disp: 21 pen, Rfl: 1  •  Vitamin D, Cholecalciferol, 50 MCG (2000 UT) capsule, Take 1 capsule by mouth Daily., Disp: 90 capsule, Rfl: 3   Past Medical History:   Diagnosis Date   • Acid reflux    • Allergies    • Arthritis    • Broken bones    • Cataracts, bilateral    • Chronic allergic rhinitis    • Colitis    • Depression 09/09/2020   • Diabetes (HCC)    • Diabetes mellitus, type 2 (HCC) 06/11/2019   • Diverticulitis    • Encounter for screening breast examination    • Essential hypertension 06/11/2019   • Essential tremor 06/11/2019   • Forgetfulness    • Gall stones    • GERD (gastroesophageal reflux disease) 06/02/2021   • High blood pressure    • HTN  (hypertension), benign    • Insomnia, unspecified 09/09/2020   • Kidney stones    • Screening for colon cancer 03/18/2013   • Sinus trouble    • Toe deformity 03/02/2021   • Vitamin B12 deficiency 09/09/2020   • Vitamin D deficiency 12/09/2020      Physical Exam  Vitals reviewed.   Constitutional:       Appearance: Normal appearance. She is well-developed. She is morbidly obese.   Neck:      Thyroid: No thyroid mass, thyromegaly or thyroid tenderness.   Cardiovascular:      Rate and Rhythm: Normal rate and regular rhythm.      Heart sounds: No murmur heard.    No friction rub. No gallop.   Pulmonary:      Effort: Pulmonary effort is normal.      Breath sounds: Normal breath sounds. No wheezing or rhonchi.   Lymphadenopathy:      Cervical: No cervical adenopathy.   Skin:     General: Skin is warm and dry.   Neurological:      Mental Status: She is alert and oriented to person, place, and time.      Cranial Nerves: No cranial nerve deficit.   Psychiatric:         Mood and Affect: Mood and affect normal.         Behavior: Behavior normal.         Thought Content: Thought content normal. Thought content does not include homicidal or suicidal ideation.         Judgment: Judgment normal.        Result Review :     CMP    CMP 3/8/22 6/14/22 9/15/22   Glucose 212 (A) 123 (A) 151 (A)   BUN 19 19 15   Creatinine 1.36 (A) 0.94 0.98   Sodium 140 138 139   Potassium 4.6 4.2 4.3   Chloride 103 104 103   Calcium 9.9 9.2 9.3   Albumin 4.30 4.00 4.00   Total Bilirubin 0.4 0.4 0.3   Alkaline Phosphatase 106 84 72   AST (SGOT) 29 40 (A) 36 (A)   ALT (SGPT) 29 30 30   (A) Abnormal value            CBC    CBC 3/8/22   WBC 6.91   RBC 4.89   Hemoglobin 15.3   Hematocrit 46.5   MCV 95.1   MCH 31.3   MCHC 32.9   RDW 12.3   Platelets 178           Lipid Panel    Lipid Panel 3/8/22 9/15/22   Total Cholesterol 116 108   Triglycerides 197 (A) 116   HDL Cholesterol 37 (A) 39 (A)   VLDL Cholesterol 32 21   LDL Cholesterol  47 48   LDL/HDL Ratio  1.07 1.17   (A) Abnormal value            TSH    TSH 9/15/22   TSH 2.060           A1C Last 3 Results    HGBA1C Last 3 Results 3/8/22 6/14/22 9/15/22   Hemoglobin A1C 8.10 (A) 8.80 (A) 7.80 (A)   (A) Abnormal value                      Assessment and Plan    Diagnoses and all orders for this visit:    1. Type 2 diabetes mellitus with hyperglycemia, with long-term current use of insulin (HCC) (Primary)  Assessment & Plan:  Diabetes is improving with treatment.   Continue current treatment regimen.  Reminded to bring in blood sugar diary at next visit.  Dietary recommendations for ADA diet.  A1c and CMP will be checked today.  Diabetes will be reassessed in 3 months.    Orders:  -     Hemoglobin A1c  -     Comprehensive Metabolic Panel    2. Essential hypertension  -     benazepril (LOTENSIN) 20 MG tablet; Take 1 tablet by mouth Daily.  Dispense: 90 tablet; Refill: 1  -     amLODIPine (NORVASC) 5 MG tablet; Take 1 tablet by mouth Daily.  Dispense: 90 tablet; Refill: 1    3. Postmenopausal  Assessment & Plan:  She is due for bone density scan, order placed.    Orders:  -     DEXA Bone Density Axial; Future    4. Gastroesophageal reflux disease, unspecified whether esophagitis present  Assessment & Plan:  GERD is stable with Pepcid daily, refill sent.    Orders:  -     famotidine (PEPCID) 40 MG tablet; Take 1 tablet by mouth Daily.  Dispense: 90 tablet; Refill: 1    5. Vitamin D deficiency  Assessment & Plan:  I discussed with her that since her vitamin D level is normal that she can stop the vitamin D weekly dose and I will put her on a vitamin D maintenance dose of 2000 units once daily.  We will plan to recheck her vitamin D on her follow-up in 3 months.    Orders:  -     Vitamin D, Cholecalciferol, 50 MCG (2000 UT) capsule; Take 1 capsule by mouth Daily.  Dispense: 90 capsule; Refill: 3    6. Mixed hyperlipidemia  Assessment & Plan:  Lipid abnormalities are improving with treatment.  Pharmacotherapy as  ordered.  Lipids will be reassessed in 3 months.    Orders:  -     atorvastatin (LIPITOR) 10 MG tablet; Take 1 tablet by mouth Every Night.  Dispense: 90 tablet; Refill: 1      Follow Up   Return in about 3 months (around 3/9/2023) for 30 min apt for complex pt.  Patient was given instructions and counseling regarding her condition or for health maintenance advice. Please see specific information pulled into the AVS if appropriate.       Chrissy Collins, NHUNG  12/09/2022

## 2022-12-09 NOTE — ASSESSMENT & PLAN NOTE
I discussed with her that since her vitamin D level is normal that she can stop the vitamin D weekly dose and I will put her on a vitamin D maintenance dose of 2000 units once daily.  We will plan to recheck her vitamin D on her follow-up in 3 months.

## 2022-12-12 NOTE — PROGRESS NOTES
Please let patient know that her A1c is still elevated at 7.9%, have her increase Lantus dose to 72 units daily.  All her other labs are stable.

## 2023-01-13 RX ORDER — GLIPIZIDE 5 MG/1
TABLET ORAL
Qty: 270 TABLET | Refills: 0 | Status: SHIPPED | OUTPATIENT
Start: 2023-01-13

## 2023-01-25 ENCOUNTER — HOSPITAL ENCOUNTER (OUTPATIENT)
Dept: BONE DENSITY | Facility: HOSPITAL | Age: 73
Discharge: HOME | End: 2023-01-25
Admitting: NURSE PRACTITIONER
Payer: MEDICARE

## 2023-01-25 DIAGNOSIS — Z78.0 POSTMENOPAUSAL: ICD-10-CM

## 2023-01-25 PROCEDURE — 77080 DXA BONE DENSITY AXIAL: CPT

## 2023-02-24 DIAGNOSIS — I10 ESSENTIAL HYPERTENSION: ICD-10-CM

## 2023-02-24 RX ORDER — AMLODIPINE BESYLATE 5 MG/1
5 TABLET ORAL DAILY
Qty: 90 TABLET | Refills: 1 | OUTPATIENT
Start: 2023-02-24

## 2023-02-24 RX ORDER — BENAZEPRIL HYDROCHLORIDE 20 MG/1
20 TABLET ORAL DAILY
Qty: 90 TABLET | Refills: 1 | OUTPATIENT
Start: 2023-02-24

## 2023-03-08 NOTE — PROGRESS NOTES
The ABCs of the Annual Wellness Visit  Subsequent Medicare Wellness Visit    Subjective    Jane Huang is a 73 y.o. female who presents for a Subsequent Medicare Wellness Visit.    The following portions of the patient's history were reviewed and   updated as appropriate: allergies, current medications, past family history, past medical history, past social history, past surgical history and problem list.    Compared to one year ago, the patient feels her physical   health is worse.    Compared to one year ago, the patient feels her mental   health is the same.    Recent Hospitalizations:  She was not admitted to the hospital during the last year.       Current Medical Providers:  Patient Care Team:  Crhissy Collins APRN as PCP - General (Nurse Practitioner)    Outpatient Medications Prior to Visit   Medication Sig Dispense Refill   • amLODIPine (NORVASC) 5 MG tablet Take 1 tablet by mouth Daily. 90 tablet 1   • atorvastatin (LIPITOR) 10 MG tablet Take 1 tablet by mouth Every Night. 90 tablet 1   • baclofen (LIORESAL) 10 MG tablet Take 1 tablet by mouth 3 (Three) Times a Day.     • benazepril (LOTENSIN) 20 MG tablet Take 1 tablet by mouth Daily. 90 tablet 1   • Calcium Carbonate 1500 (600 Ca) MG tablet Take 1 tablet by mouth 2 (two) times a day.     • cetirizine (zyrTEC) 10 MG tablet Take 1 tablet by mouth Daily.     • famotidine (PEPCID) 40 MG tablet Take 1 tablet by mouth Daily. 90 tablet 1   • gabapentin (NEURONTIN) 300 MG capsule 1 capsule 3 (Three) Times a Day As Needed (pain).     • glipizide (GLUCOTROL) 5 MG tablet TAKE 2 TABLETS BY MOUTH EVERY MORNING AND TAKE 1 TABLET BY MOUTH EVERY EVENING 270 tablet 0   • HYDROcodone-acetaminophen (NORCO)  MG per tablet Take 1 tablet by mouth Every 12 (Twelve) Hours As Needed.     • Insulin Pen Needle (B-D ULTRAFINE III SHORT PEN) 31G X 8 MM misc Inject 1 each under the skin into the appropriate area as directed See Admin Instructions. 100 each 5   •  meclizine 25 MG chewable tablet chewable tablet CHEW AND SWALLOW 1 TABLET BY MOUTH EVERY 6 HOURS AS NEEDED     • ondansetron ODT (ZOFRAN-ODT) 4 MG disintegrating tablet      • vitamin B-12 (CYANOCOBALAMIN) 1000 MCG tablet TAKE 1 TABLET BY MOUTH EVERY DAY 90 tablet 1   • Vitamin D, Cholecalciferol, 50 MCG (2000 UT) capsule Take 1 capsule by mouth Daily. 90 capsule 3   • colestipol (COLESTID) 1 g tablet TAKE 1 TABLET BY MOUTH TWICE DAILY. SWALLOW WHOLE WITH ANY LIQUID. DO NOT CRUSH CHEW OR DIVIDE FOR 90 DAYS 180 tablet 1   • Lantus SoloStar 100 UNIT/ML injection pen Inject 70 Units under the skin into the appropriate area as directed Daily for 90 days. 21 pen 1     No facility-administered medications prior to visit.       Opioid medication/s are on active medication list.  and I have evaluated her active treatment plan and pain score trends (see table).  There were no vitals filed for this visit.  I have reviewed the chart for potential of high risk medication and harmful drug interactions in the elderly.            Aspirin is not on active medication list.  Aspirin use is not indicated based on review of current medical condition/s. Risk of harm outweighs potential benefits.  .    Patient Active Problem List   Diagnosis   • Arthritis   • Broken bones   • Cataracts, bilateral   • Depression   • Diabetes mellitus, type 2 (HCC)   • Diverticulitis   • Essential tremor   • Gall stones   • GERD (gastroesophageal reflux disease)   • Essential hypertension   • Indeterminate colitis   • Insomnia, unspecified   • Kidney stones   • Toe deformity   • Vitamin B12 deficiency   • Vitamin D deficiency   • Actinic keratosis   • Chronic pain disorder   • Degeneration of lumbar intervertebral disc   • Inflammation of sacroiliac joint (HCC)   • Lumbar spondylosis   • Vertigo   • Unsteady gait   • Mixed hyperlipidemia   • Postmenopausal     Advance Care Planning  Advance Directive is not on file.  ACP discussion was held with the  "patient during this visit. Patient does not have an advance directive, declines further assistance.     Objective    Vitals:    03/09/23 1429   BP: 133/54   Pulse: 92   Temp: 97.2 °F (36.2 °C)   SpO2: 96%   Weight: 107 kg (236 lb 6.4 oz)   Height: 160 cm (63\")     Estimated body mass index is 41.88 kg/m² as calculated from the following:    Height as of this encounter: 160 cm (63\").    Weight as of this encounter: 107 kg (236 lb 6.4 oz).    Class 3 Severe Obesity (BMI >=40). Obesity-related health conditions include the following: hypertension, diabetes mellitus and dyslipidemias. Obesity is stable with minimal weight loss.. BMI is above goal; Discussed healthy choices for overall vanessa promotion.. We discussed portion control and increasing exercise.      Does the patient have evidence of cognitive impairment? No          HEALTH RISK ASSESSMENT    Smoking Status:  Social History     Tobacco Use   Smoking Status Never   Smokeless Tobacco Never     Alcohol Consumption:  Social History     Substance and Sexual Activity   Alcohol Use Never     Fall Risk Screen:    STEADI Fall Risk Assessment was completed, and patient is at HIGH risk for falls. Assessment completed on:3/9/2023    Depression Screening:  PHQ-2/PHQ-9 Depression Screening 3/9/2023   Little Interest or Pleasure in Doing Things 0-->not at all   Feeling Down, Depressed or Hopeless 0-->not at all   PHQ-9: Brief Depression Severity Measure Score 0       Health Habits and Functional and Cognitive Screening:  Functional & Cognitive Status 3/9/2023   Do you have difficulty preparing food and eating? No   Do you have difficulty bathing yourself, getting dressed or grooming yourself? No   Do you have difficulty using the toilet? No   Do you have difficulty moving around from place to place? Yes   Do you have trouble with steps or getting out of a bed or a chair? Yes   Current Diet Well Balanced Diet   Dental Exam Up to date   Eye Exam Up to date   Exercise (times " per week) 0 times per week   Current Exercises Include No Regular Exercise   Do you need help using the phone?  No   Are you deaf or do you have serious difficulty hearing?  No   Do you need help with transportation? No   Do you need help shopping? No   Do you need help preparing meals?  No   Do you need help with housework?  No   Do you need help with laundry? No   Do you need help taking your medications? No   Do you need help managing money? No   Do you ever drive or ride in a car without wearing a seat belt? No   Have you felt unusual stress, anger or loneliness in the last month? No   Who do you live with? Child   If you need help, do you have trouble finding someone available to you? No   Have you been bothered in the last four weeks by sexual problems? No   Do you have difficulty concentrating, remembering or making decisions? No       Age-appropriate Screening Schedule:  Refer to the list below for future screening recommendations based on patient's age, sex and/or medical conditions. Orders for these recommended tests are listed in the plan section. The patient has been provided with a written plan.    Health Maintenance   Topic Date Due   • ZOSTER VACCINE (1 of 2) Never done   • COLORECTAL CANCER SCREENING  03/18/2023   • DIABETIC FOOT EXAM  04/27/2023   • HEMOGLOBIN A1C  06/09/2023   • URINE MICROALBUMIN  06/14/2023   • DIABETIC EYE EXAM  07/12/2023   • LIPID PANEL  09/15/2023   • ANNUAL WELLNESS VISIT  03/09/2024   • DXA SCAN  01/25/2025   • TDAP/TD VACCINES (2 - Td or Tdap) 10/26/2032   • HEPATITIS C SCREENING  Completed   • COVID-19 Vaccine  Completed   • INFLUENZA VACCINE  Completed   • Pneumococcal Vaccine 65+  Completed                CMS Preventative Services Quick Reference  Risk Factors Identified During Encounter  Fall Risk-High or Moderate: Chronic pain with impaired mobility;   Dental Screening Recommended; Has routine Dental care   Vision Screening Recommended; Has routine Vision exams  The  "above risks/problems have been discussed with the patient.  Pertinent information has been shared with the patient in the After Visit Summary.  An After Visit Summary and PPPS were made available to the patient.    Follow Up:   Next Medicare Wellness visit to be scheduled in 1 year.       Additional E&M Note during same encounter follows:  Patient has multiple medical problems which are significant and separately identifiable that require additional work above and beyond the Medicare Wellness Visit.      Chief Complaint  Medicare Wellness-subsequent, Hypertension, Hyperlipidemia, and Hip Pain (Left )    Subjective        HPI  Jane Huang is also being seen today for 3 month follow up.    Patient is complaining today of left hip pain. She states when she was at pain management recently, they told her to follow-up with PCP for possible Ultrasound. She was treated with oral steroids for 5 days and noted some improvement but the swelling or raised area around the hip did not improve. She has had two falls within the past year and has noted a significant change. She is barely able to get up the porch and is having some difficulties with tolerating her work day and mobility in generally. With increased pain, she experiences body shaking. She has not been seen by Ortho for her hip in a very long time.     Hypertension; She is currently managed with Amlodipine 5mg and Benazepril 20mg daily. BP at desired limits today in office.     Diabetes: She currently takes Lantus 72 units daily as well as Glipizide 5mg daily. She checks her Blood sugar in the mornings when she gets up. She states the average reading have been to 160 in the mornings. She does not follow a certain diet.       Objective   Vital Signs:  /54   Pulse 92   Temp 97.2 °F (36.2 °C)   Ht 160 cm (63\")   Wt 107 kg (236 lb 6.4 oz)   SpO2 96%   BMI 41.88 kg/m²     Physical Exam  Vitals reviewed.   Constitutional:       Appearance: Normal appearance. " She is well-developed. She is morbidly obese.   Neck:      Thyroid: No thyroid mass, thyromegaly or thyroid tenderness.   Cardiovascular:      Rate and Rhythm: Normal rate and regular rhythm.      Heart sounds: Normal heart sounds. No murmur heard.    No friction rub. No gallop.   Pulmonary:      Effort: Pulmonary effort is normal.      Breath sounds: Normal breath sounds. No wheezing or rhonchi.   Musculoskeletal:      Cervical back: Normal range of motion.      Right lower leg: No edema.      Left lower leg: No edema.        Legs:    Lymphadenopathy:      Cervical: No cervical adenopathy.   Skin:     General: Skin is warm and dry.   Neurological:      Mental Status: She is alert and oriented to person, place, and time.      Cranial Nerves: No cranial nerve deficit.   Psychiatric:         Attention and Perception: Attention and perception normal.         Mood and Affect: Mood and affect normal.         Speech: Speech normal.         Behavior: Behavior normal.         Thought Content: Thought content normal. Thought content does not include homicidal or suicidal ideation.         Cognition and Memory: Cognition normal.         Judgment: Judgment normal.          The following data was reviewed by: NHUNG Ramos on 03/09/2023:  Common labs    Common Labs 6/14/22 6/14/22 6/14/22 9/15/22 9/15/22 9/15/22 12/9/22 12/9/22    1519 1519 1519 1456 1456 1456 1507 1507   Glucose   123 (A)  151 (A)   120 (A)   BUN   19  15   19   Creatinine   0.94  0.98   0.99   Sodium   138  139   138   Potassium   4.2  4.3   4.5   Chloride   104  103   104   Calcium   9.2  9.3   9.8   Albumin   4.00  4.00   3.70   Total Bilirubin   0.4  0.3   0.3   Alkaline Phosphatase   84  72   96   AST (SGOT)   40 (A)  36 (A)   33 (A)   ALT (SGPT)   30  30   28   Total Cholesterol      108     Triglycerides      116     HDL Cholesterol      39 (A)     LDL Cholesterol       48     Hemoglobin A1C 8.80 (A)   7.80 (A)   7.90 (A)    Microalbumin,  Urine  1.2         (A) Abnormal value            TSH    TSH 9/15/22   TSH 2.060           Microalbumin    Microalbumin 6/14/22   Microalbumin, Urine 1.2                      Assessment and Plan   Diagnoses and all orders for this visit:    1. Mass of soft tissue of hip (Primary)  Comments:  I will order US of tissue to hip area as well as an x-ray for further evaluation. I will review results and consider referral to Orthopedic surgeon if needed.  Orders:  -     US Soft Tissue; Future    2. Left hip pain  Comments:  I will order an x-ray for further evaluation of the hip structure.   Orders:  -     XR Hip With or Without Pelvis 2 - 3 View Left; Future    3. Type 2 diabetes mellitus with hyperglycemia, with long-term current use of insulin (HCC)  Assessment & Plan:  Diabetes is worsening.   Dietary recommendations for ADA diet.  Regular aerobic exercise.  Reminded to get yearly retinal exam.  We will be checking A1c today.  Diabetes will be reassessed in 3 months.  Patient is currently taking Lantus 72 units at night as injection, along with Glipizide 5mg daily.    Orders:  -     TSH+Free T4  -     CBC Auto Differential  -     Comprehensive Metabolic Panel  -     Lipid Panel  -     Hemoglobin A1c    4. Essential hypertension  Assessment & Plan:  Hypertension is improving with treatment.  Continue current treatment regimen.  Weight loss.  Blood pressure will be reassessed in 3 months.  I will redraw blood work to evaluate further.    Orders:  -     TSH+Free T4  -     CBC Auto Differential  -     Comprehensive Metabolic Panel  -     Lipid Panel    5. Vitamin D deficiency  Assessment & Plan:  Currently taking Vitamin D 2000 units daily. I will redraw Vitamin D level today for further evaluation.    Orders:  -     Vitamin D,25-Hydroxy    6. Vitamin B12 deficiency  Assessment & Plan:  Patient is currently taking Vitamin B12 1000mcg tablet daily. I will redraw B12 level today for further evaluation and  monitoring.    Orders:  -     Vitamin B12 & Folate    7. Mixed hyperlipidemia  Assessment & Plan:  Lipid abnormalities are improving with treatment.  Nutritional counseling was provided. and Pharmacotherapy as ordered.  Lipids will be reassessed in 3 months.  Continue taking Atorvastatin 10mg daily and Colestid 1gm twice daily for management.    Orders:  -     Comprehensive Metabolic Panel  -     Lipid Panel    Other orders  -     colestipol (COLESTID) 1 g tablet; Take 1 tablet by mouth 2 (Two) Times a Day.  Dispense: 180 tablet; Refill: 1    Follow Up   Return in about 3 months (around 6/9/2023) for 30 min apt for complex pt DM, HTN, HLD.  Patient was given instructions and counseling regarding her condition or for health maintenance advice. Please see specific information pulled into the AVS if appropriate.     Parts of this note are electronic transcriptions/translations of spoken language to printed text using the Dragon Dictation system.    I reviewed all the information by my student NHUNG Hoffman student, and I attest that all information is correct.    I reviewed all the information by my student NHUNG Hoffman student, and I attest that all information is correct.    Parts of this note are electronic transcriptions/translations of spoken language to printed text using the Dragon Dictation system.    NHUNG Ramos  03/09/2023

## 2023-03-09 ENCOUNTER — OFFICE VISIT (OUTPATIENT)
Dept: FAMILY MEDICINE CLINIC | Facility: CLINIC | Age: 73
End: 2023-03-09
Payer: MEDICARE

## 2023-03-09 VITALS
DIASTOLIC BLOOD PRESSURE: 54 MMHG | HEART RATE: 92 BPM | TEMPERATURE: 97.2 F | WEIGHT: 236.4 LBS | HEIGHT: 63 IN | OXYGEN SATURATION: 96 % | BODY MASS INDEX: 41.89 KG/M2 | SYSTOLIC BLOOD PRESSURE: 133 MMHG

## 2023-03-09 DIAGNOSIS — E78.2 MIXED HYPERLIPIDEMIA: ICD-10-CM

## 2023-03-09 DIAGNOSIS — I10 ESSENTIAL HYPERTENSION: ICD-10-CM

## 2023-03-09 DIAGNOSIS — E55.9 VITAMIN D DEFICIENCY: ICD-10-CM

## 2023-03-09 DIAGNOSIS — Z79.4 TYPE 2 DIABETES MELLITUS WITH HYPERGLYCEMIA, WITH LONG-TERM CURRENT USE OF INSULIN: ICD-10-CM

## 2023-03-09 DIAGNOSIS — E11.65 TYPE 2 DIABETES MELLITUS WITH HYPERGLYCEMIA, WITH LONG-TERM CURRENT USE OF INSULIN: ICD-10-CM

## 2023-03-09 DIAGNOSIS — E53.8 VITAMIN B12 DEFICIENCY: ICD-10-CM

## 2023-03-09 DIAGNOSIS — M25.552 LEFT HIP PAIN: ICD-10-CM

## 2023-03-09 DIAGNOSIS — M79.89 MASS OF SOFT TISSUE OF HIP: Primary | ICD-10-CM

## 2023-03-09 DIAGNOSIS — R93.89 ABNORMAL X-RAY: ICD-10-CM

## 2023-03-09 LAB
25(OH)D3 SERPL-MCNC: 35.3 NG/ML (ref 30–100)
ALBUMIN SERPL-MCNC: 3.5 G/DL (ref 3.5–5.2)
ALBUMIN/GLOB SERPL: 1 G/DL
ALP SERPL-CCNC: 94 U/L (ref 39–117)
ALT SERPL W P-5'-P-CCNC: 39 U/L (ref 1–33)
ANION GAP SERPL CALCULATED.3IONS-SCNC: 10.8 MMOL/L (ref 5–15)
AST SERPL-CCNC: 36 U/L (ref 1–32)
BASOPHILS # BLD AUTO: 0.03 10*3/MM3 (ref 0–0.2)
BASOPHILS NFR BLD AUTO: 0.5 % (ref 0–1.5)
BILIRUB SERPL-MCNC: 0.4 MG/DL (ref 0–1.2)
BUN SERPL-MCNC: 15 MG/DL (ref 8–23)
BUN/CREAT SERPL: 13.4 (ref 7–25)
CALCIUM SPEC-SCNC: 9.5 MG/DL (ref 8.6–10.5)
CHLORIDE SERPL-SCNC: 101 MMOL/L (ref 98–107)
CHOLEST SERPL-MCNC: 110 MG/DL (ref 0–200)
CO2 SERPL-SCNC: 25.2 MMOL/L (ref 22–29)
CREAT SERPL-MCNC: 1.12 MG/DL (ref 0.57–1)
DEPRECATED RDW RBC AUTO: 46.5 FL (ref 37–54)
EGFRCR SERPLBLD CKD-EPI 2021: 51.4 ML/MIN/1.73
EOSINOPHIL # BLD AUTO: 0.1 10*3/MM3 (ref 0–0.4)
EOSINOPHIL NFR BLD AUTO: 1.8 % (ref 0.3–6.2)
ERYTHROCYTE [DISTWIDTH] IN BLOOD BY AUTOMATED COUNT: 13.1 % (ref 12.3–15.4)
FOLATE SERPL-MCNC: 9.88 NG/ML (ref 4.78–24.2)
GLOBULIN UR ELPH-MCNC: 3.5 GM/DL
GLUCOSE SERPL-MCNC: 335 MG/DL (ref 65–99)
HBA1C MFR BLD: 8.5 % (ref 4.8–5.6)
HCT VFR BLD AUTO: 44.1 % (ref 34–46.6)
HDLC SERPL-MCNC: 36 MG/DL (ref 40–60)
HGB BLD-MCNC: 14.3 G/DL (ref 12–15.9)
IMM GRANULOCYTES # BLD AUTO: 0.01 10*3/MM3 (ref 0–0.05)
IMM GRANULOCYTES NFR BLD AUTO: 0.2 % (ref 0–0.5)
LDLC SERPL CALC-MCNC: 43 MG/DL (ref 0–100)
LDLC/HDLC SERPL: 1.01 {RATIO}
LYMPHOCYTES # BLD AUTO: 1.81 10*3/MM3 (ref 0.7–3.1)
LYMPHOCYTES NFR BLD AUTO: 32.4 % (ref 19.6–45.3)
MCH RBC QN AUTO: 31.9 PG (ref 26.6–33)
MCHC RBC AUTO-ENTMCNC: 32.4 G/DL (ref 31.5–35.7)
MCV RBC AUTO: 98.4 FL (ref 79–97)
MONOCYTES # BLD AUTO: 0.31 10*3/MM3 (ref 0.1–0.9)
MONOCYTES NFR BLD AUTO: 5.6 % (ref 5–12)
NEUTROPHILS NFR BLD AUTO: 3.32 10*3/MM3 (ref 1.7–7)
NEUTROPHILS NFR BLD AUTO: 59.5 % (ref 42.7–76)
NRBC BLD AUTO-RTO: 0 /100 WBC (ref 0–0.2)
PLATELET # BLD AUTO: 149 10*3/MM3 (ref 140–450)
PMV BLD AUTO: 11.6 FL (ref 6–12)
POTASSIUM SERPL-SCNC: 4.4 MMOL/L (ref 3.5–5.2)
PROT SERPL-MCNC: 7 G/DL (ref 6–8.5)
RBC # BLD AUTO: 4.48 10*6/MM3 (ref 3.77–5.28)
SODIUM SERPL-SCNC: 137 MMOL/L (ref 136–145)
TRIGL SERPL-MCNC: 188 MG/DL (ref 0–150)
VIT B12 BLD-MCNC: 1624 PG/ML (ref 211–946)
VLDLC SERPL-MCNC: 31 MG/DL (ref 5–40)
WBC NRBC COR # BLD: 5.58 10*3/MM3 (ref 3.4–10.8)

## 2023-03-09 PROCEDURE — 36415 COLL VENOUS BLD VENIPUNCTURE: CPT | Performed by: NURSE PRACTITIONER

## 2023-03-09 PROCEDURE — 84443 ASSAY THYROID STIM HORMONE: CPT | Performed by: NURSE PRACTITIONER

## 2023-03-09 PROCEDURE — 82607 VITAMIN B-12: CPT | Performed by: NURSE PRACTITIONER

## 2023-03-09 PROCEDURE — 82746 ASSAY OF FOLIC ACID SERUM: CPT | Performed by: NURSE PRACTITIONER

## 2023-03-09 PROCEDURE — 84439 ASSAY OF FREE THYROXINE: CPT | Performed by: NURSE PRACTITIONER

## 2023-03-09 PROCEDURE — 3078F DIAST BP <80 MM HG: CPT | Performed by: NURSE PRACTITIONER

## 2023-03-09 PROCEDURE — 1160F RVW MEDS BY RX/DR IN RCRD: CPT | Performed by: NURSE PRACTITIONER

## 2023-03-09 PROCEDURE — 80053 COMPREHEN METABOLIC PANEL: CPT | Performed by: NURSE PRACTITIONER

## 2023-03-09 PROCEDURE — 1170F FXNL STATUS ASSESSED: CPT | Performed by: NURSE PRACTITIONER

## 2023-03-09 PROCEDURE — 83036 HEMOGLOBIN GLYCOSYLATED A1C: CPT | Performed by: NURSE PRACTITIONER

## 2023-03-09 PROCEDURE — 1159F MED LIST DOCD IN RCRD: CPT | Performed by: NURSE PRACTITIONER

## 2023-03-09 PROCEDURE — 3075F SYST BP GE 130 - 139MM HG: CPT | Performed by: NURSE PRACTITIONER

## 2023-03-09 PROCEDURE — G0439 PPPS, SUBSEQ VISIT: HCPCS | Performed by: NURSE PRACTITIONER

## 2023-03-09 PROCEDURE — 80061 LIPID PANEL: CPT | Performed by: NURSE PRACTITIONER

## 2023-03-09 PROCEDURE — 96160 PT-FOCUSED HLTH RISK ASSMT: CPT | Performed by: NURSE PRACTITIONER

## 2023-03-09 PROCEDURE — 82306 VITAMIN D 25 HYDROXY: CPT | Performed by: NURSE PRACTITIONER

## 2023-03-09 PROCEDURE — 85025 COMPLETE CBC W/AUTO DIFF WBC: CPT | Performed by: NURSE PRACTITIONER

## 2023-03-09 PROCEDURE — 99214 OFFICE O/P EST MOD 30 MIN: CPT | Performed by: NURSE PRACTITIONER

## 2023-03-09 RX ORDER — MONTELUKAST SODIUM 4 MG/1
1 TABLET, CHEWABLE ORAL 2 TIMES DAILY
Qty: 180 TABLET | Refills: 1 | Status: SHIPPED | OUTPATIENT
Start: 2023-03-09

## 2023-03-09 NOTE — ASSESSMENT & PLAN NOTE
Diabetes is worsening.   Dietary recommendations for ADA diet.  Regular aerobic exercise.  Reminded to get yearly retinal exam.  We will be checking A1c today.  Diabetes will be reassessed in 3 months.  Patient is currently taking Lantus 72 units at night as injection, along with Glipizide 5mg daily.

## 2023-03-09 NOTE — ASSESSMENT & PLAN NOTE
Patient is currently taking Vitamin B12 1000mcg tablet daily. I will redraw B12 level today for further evaluation and monitoring.

## 2023-03-09 NOTE — ASSESSMENT & PLAN NOTE
Hypertension is improving with treatment.  Continue current treatment regimen.  Weight loss.  Blood pressure will be reassessed in 3 months.  I will redraw blood work to evaluate further.

## 2023-03-09 NOTE — ASSESSMENT & PLAN NOTE
Currently taking Vitamin D 2000 units daily. I will redraw Vitamin D level today for further evaluation.

## 2023-03-09 NOTE — ASSESSMENT & PLAN NOTE
Lipid abnormalities are improving with treatment.  Nutritional counseling was provided. and Pharmacotherapy as ordered.  Lipids will be reassessed in 3 months.  Continue taking Atorvastatin 10mg daily and Colestid 1gm twice daily for management.

## 2023-03-10 LAB
T4 FREE SERPL-MCNC: 1.08 NG/DL (ref 0.93–1.7)
TSH SERPL DL<=0.05 MIU/L-ACNC: 1.3 UIU/ML (ref 0.27–4.2)

## 2023-03-10 NOTE — PROGRESS NOTES
A1c did increase to 8.5%, I want her to increase Lantus dose to 72 units daily.  Cholesterol looks good, continue current dose of atorvastatin.  Vitamin B12 level is elevated so need to decrease taking vitamin B12 to just twice weekly.  Vitamin D level is low normal and I recommend increasing vitamin D to 4000 units once daily.  Thyroid levels look good.

## 2023-03-21 DIAGNOSIS — E55.9 VITAMIN D DEFICIENCY: ICD-10-CM

## 2023-03-21 RX ORDER — MULTIVIT-MIN/IRON/FOLIC ACID/K 18-600-40
4000 CAPSULE ORAL DAILY
Qty: 180 CAPSULE | Refills: 1 | Status: SHIPPED | OUTPATIENT
Start: 2023-03-21

## 2023-03-28 ENCOUNTER — HOSPITAL ENCOUNTER (OUTPATIENT)
Dept: ULTRASOUND IMAGING | Facility: HOSPITAL | Age: 73
Discharge: HOME | End: 2023-03-28
Payer: MEDICARE

## 2023-03-28 ENCOUNTER — HOSPITAL ENCOUNTER (OUTPATIENT)
Dept: GENERAL RADIOLOGY | Facility: HOSPITAL | Age: 73
Discharge: HOME | End: 2023-03-28
Payer: MEDICARE

## 2023-03-28 DIAGNOSIS — M79.89 MASS OF SOFT TISSUE OF HIP: ICD-10-CM

## 2023-03-28 DIAGNOSIS — M25.552 LEFT HIP PAIN: ICD-10-CM

## 2023-03-28 PROCEDURE — 76999 ECHO EXAMINATION PROCEDURE: CPT

## 2023-03-28 PROCEDURE — 73502 X-RAY EXAM HIP UNI 2-3 VIEWS: CPT

## 2023-04-05 ENCOUNTER — HOSPITAL ENCOUNTER (OUTPATIENT)
Dept: MRI IMAGING | Facility: HOSPITAL | Age: 73
Discharge: HOME | End: 2023-04-05
Admitting: NURSE PRACTITIONER
Payer: MEDICARE

## 2023-04-05 DIAGNOSIS — M25.552 LEFT HIP PAIN: ICD-10-CM

## 2023-04-05 DIAGNOSIS — R93.89 ABNORMAL X-RAY: ICD-10-CM

## 2023-04-05 DIAGNOSIS — M79.89 MASS OF SOFT TISSUE OF HIP: ICD-10-CM

## 2023-04-05 PROCEDURE — 73721 MRI JNT OF LWR EXTRE W/O DYE: CPT

## 2023-04-10 RX ORDER — GLIPIZIDE 5 MG/1
TABLET ORAL
Qty: 270 TABLET | Refills: 0 | Status: SHIPPED | OUTPATIENT
Start: 2023-04-10

## 2023-04-20 ENCOUNTER — OFFICE VISIT (OUTPATIENT)
Dept: ORTHOPEDIC SURGERY | Facility: CLINIC | Age: 73
End: 2023-04-20
Payer: MEDICARE

## 2023-04-20 VITALS — BODY MASS INDEX: 42.88 KG/M2 | WEIGHT: 242 LBS | HEIGHT: 63 IN

## 2023-04-20 DIAGNOSIS — M47.816 OSTEOARTHRITIS OF LUMBAR SPINE, UNSPECIFIED SPINAL OSTEOARTHRITIS COMPLICATION STATUS: ICD-10-CM

## 2023-04-20 DIAGNOSIS — M70.62 TROCHANTERIC BURSITIS OF LEFT HIP: ICD-10-CM

## 2023-04-20 DIAGNOSIS — M16.12 OSTEOARTHRITIS OF LEFT HIP, UNSPECIFIED OSTEOARTHRITIS TYPE: Primary | ICD-10-CM

## 2023-04-20 DIAGNOSIS — M53.3 CHRONIC LEFT SI JOINT PAIN: ICD-10-CM

## 2023-04-20 DIAGNOSIS — G89.29 CHRONIC LEFT SI JOINT PAIN: ICD-10-CM

## 2023-04-20 RX ADMIN — METHYLPREDNISOLONE ACETATE 80 MG: 80 INJECTION, SUSPENSION INTRA-ARTICULAR; INTRALESIONAL; INTRAMUSCULAR; SOFT TISSUE at 13:57

## 2023-04-20 RX ADMIN — LIDOCAINE HYDROCHLORIDE 5 ML: 10 INJECTION, SOLUTION EPIDURAL; INFILTRATION; INTRACAUDAL; PERINEURAL at 13:57

## 2023-04-20 NOTE — PROGRESS NOTES
"Chief Complaint  Pain and Initial Evaluation of the Left Hip     Subjective      Jane Huang presents to [facility] for evaluation of the left hip. She reports left hip pain for several months. She had a fall in October but besides that doesn't recall a specific injury. She locates pain to the posterior hip. She admits to burning and numbness of her right leg as well.      Allergies   Allergen Reactions   • Penicillins Anaphylaxis        Social History     Socioeconomic History   • Marital status:    Tobacco Use   • Smoking status: Never   • Smokeless tobacco: Never   Vaping Use   • Vaping Use: Never used   Substance and Sexual Activity   • Alcohol use: Never   • Drug use: Never   • Sexual activity: Defer        Review of Systems     Objective   Vital Signs:   Ht 160 cm (63\")   Wt 110 kg (242 lb)   BMI 42.87 kg/m²       Physical Exam  Constitutional:       Appearance: Normal appearance. The patient is well-developed and normal weight.   HENT:      Head: Normocephalic.      Right Ear: Hearing and external ear normal.      Left Ear: Hearing and external ear normal.      Nose: Nose normal.   Eyes:      Conjunctiva/sclera: Conjunctivae normal.   Cardiovascular:      Rate and Rhythm: Normal rate.   Pulmonary:      Effort: Pulmonary effort is normal.      Breath sounds: No wheezing or rales.   Abdominal:      Palpations: Abdomen is soft.      Tenderness: There is no abdominal tenderness.   Musculoskeletal:      Cervical back: Normal range of motion.   Skin:     Findings: No rash.   Neurological:      Mental Status: The patient is alert and oriented to person, place, and time.   Psychiatric:         Mood and Affect: Mood and affect normal.         Judgment: Judgment normal.       Ortho Exam      Left hip- Tender to the posterior hip and SI joint. No lumbar tenderness. Tender to the lateral hip. Positive EHL, FHL, GS and TA. Sensation intact to all 5 nerves of the foot. Positive " pulses. Flexion 80. External Rotation 25. Internal rotation 15. Pain with hip motion. Negative straight leg raise     Left hip : L greater trochanteric bursa  Date/Time: 4/20/2023 1:57 PM  Consent given by: patient  Site marked: site marked  Timeout: Immediately prior to procedure a time out was called to verify the correct patient, procedure, equipment, support staff and site/side marked as required   Supporting Documentation  Indications: pain   Procedure Details  Location: hip - L greater trochanteric bursa  Needle size: 22 G  Medications administered: 5 mL lidocaine PF 1% 1 %; 80 mg methylPREDNISolone acetate 80 MG/ML  Patient tolerance: patient tolerated the procedure well with no immediate complications          Imaging Results (Most Recent)     None           Result Review :       MRI Hip Left Without Contrast    Result Date: 4/6/2023  Narrative: PROCEDURE: MRI HIP LEFT WO CONTRAST  COMPARISON:  [facility], CR, XR HIP W OR WO PELVIS 2-3 VIEW LEFT, 3/28/2023, 13:10. INDICATIONS: CHRONIC LEFT HIP PAIN. INCREASED PAIN X COUPLE WEEKS.      TECHNIQUE: A comprehensive examination was performed utilizing a variety of imaging planes and imaging parameters to optimize visualization of suspected pathology.  Images were performed without contrast.  FINDINGS:  No fracture or malalignment is identified.  Marrow signal appears normal.  Dedicated images of the left hip were obtained.  No labral tear is identified.  Prominent osteophyte formation is noted along the lateral aspect of the acetabular roof which predisposes to pincer type femoral acetabular impingement.  Cartilage in the joint is intact.  No loose body is seen.  No significant joint effusion is evident.  Enthesopathic changes are noted at the gluteal tendon insertions onto the greater trochanter.  Dedicated images of the right hip were not obtained.  Prominent osteophyte formation along the lateral acetabular roof predisposes to pincer type  femoral acetabular impingement.  No joint effusion or loose body is seen.  Enthesopathic changes are noted at the gluteal tendon insertions onto the greater trochanter.  Moderate left and mild right trochanteric bursitis is noted.  Superimposed sub gluteal bursitis is noted bilaterally, left more so than right.  Visualized tendons and musculature around the pelvis appear unremarkable.  Enthesopathic changes are noted along the iliac wings bilaterally.  No adenopathy or free fluid is seen in the pelvis.  The visualized pelvic viscera appear normal.      Impression:   1. Mild bilateral hip osteoarthritis 2. 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 3. Bilateral trochanteric and sub gluteal bursitis, left more so than right 4. No labral tear identified. 5. Bilateral enthesopathic changes.  Findings suggest a history of diffuse idiopathic skeletal hyperostosis.     Yonatan Cerrato M.D.       Electronically Signed and Approved By: Yonatan Cerrato M.D. on 4/06/2023 at 10:37             US Soft Tissue    Result Date: 3/28/2023  Narrative: PROCEDURE: US SOFT TISSUE  COMPARISON: None  INDICATIONS: painful mass of left posterior hip/upper gluteal area  FINDINGS:  Ultrasound of left lower back demonstrates no well-defined mass or fluid collection.  No definite anatomic distortion is noted.  No acute findings are clearly evident.      Impression:   1. No well-defined mass or fluid collection is seen in the left lower back/gluteal region with ultrasound.  If persisting concern, consider CT follow-up CT.      JB MOULTON MD       Electronically Signed and Approved By: JB MOULTON MD on 3/28/2023 at 14:12             XR Hip With or Without Pelvis 2 - 3 View Left    Result Date: 3/28/2023  Narrative: PROCEDURE: XR HIP W OR WO PELVIS 2-3 VIEW LEFT  COMPARISON: None  INDICATIONS: PAINFUL KNOT ON LEFT POSTERIOR HIP, JUST LATERAL TO SACRUM FOR 6 WEEKS. NO KNOWN INJURY.   FINDINGS:  There is mild joint space narrowing with marginal spur formation at the hip joint.  There are calcifications identified associated with the greater trochanter and along the left iliac crest.  No fractures or destructive bone lesions are identified.      Impression:   1. Mild osteoarthritis of the left hip 2. Enthesopathy involving the greater trochanter and left iliac crest      Ibrahima Hobson MD       Electronically Signed and Approved By: Ibrahima Hobson MD on 3/28/2023 at 14:10                      Assessment and Plan     Diagnoses and all orders for this visit:    1. Osteoarthritis of left hip, unspecified osteoarthritis type (Primary)    2. Trochanteric bursitis of left hip    3. Chronic left SI joint pain    4. Osteoarthritis of lumbar spine, unspecified spinal osteoarthritis complication status        Discussed the treatment plan with the patient.  I reviewed the images with the patient. Plan for MRI of the lumbar spine to evaluate for stenosis due to she is having nerve related pain as well. Discussed the risks and benefits of a left hip bursa injection. The patient expressed understanding and wished to proceed. She tolerated the injection well.     Call or return if worsening symptoms.    Follow Up     MRI results      Patient was given instructions and counseling regarding her condition or for health maintenance advice. Please see specific information pulled into the AVS if appropriate.     Scribed for Ibrahima Craven MD by Daniela Laguerre.  04/20/23   13:41 EDT    I have personally performed the services described in this document as scribed by the above individual and it is both accurate and complete. Ibrahima Craven MD 04/21/23

## 2023-04-21 RX ORDER — LIDOCAINE HYDROCHLORIDE 10 MG/ML
5 INJECTION, SOLUTION EPIDURAL; INFILTRATION; INTRACAUDAL; PERINEURAL
Status: COMPLETED | OUTPATIENT
Start: 2023-04-20 | End: 2023-04-20

## 2023-04-21 RX ORDER — METHYLPREDNISOLONE ACETATE 80 MG/ML
80 INJECTION, SUSPENSION INTRA-ARTICULAR; INTRALESIONAL; INTRAMUSCULAR; SOFT TISSUE
Status: COMPLETED | OUTPATIENT
Start: 2023-04-20 | End: 2023-04-20

## 2023-05-19 ENCOUNTER — HOSPITAL ENCOUNTER (OUTPATIENT)
Dept: MRI IMAGING | Facility: HOSPITAL | Age: 73
Discharge: HOME | End: 2023-05-19
Payer: MEDICARE

## 2023-05-19 DIAGNOSIS — G89.29 CHRONIC LEFT SI JOINT PAIN: ICD-10-CM

## 2023-05-19 DIAGNOSIS — M53.3 CHRONIC LEFT SI JOINT PAIN: ICD-10-CM

## 2023-05-19 DIAGNOSIS — M47.816 OSTEOARTHRITIS OF LUMBAR SPINE, UNSPECIFIED SPINAL OSTEOARTHRITIS COMPLICATION STATUS: ICD-10-CM

## 2023-05-19 PROCEDURE — 72148 MRI LUMBAR SPINE W/O DYE: CPT

## 2023-05-25 ENCOUNTER — OFFICE VISIT (OUTPATIENT)
Dept: ORTHOPEDIC SURGERY | Facility: CLINIC | Age: 73
End: 2023-05-25

## 2023-05-25 VITALS — HEART RATE: 84 BPM | OXYGEN SATURATION: 97 % | BODY MASS INDEX: 42.52 KG/M2 | WEIGHT: 240 LBS | HEIGHT: 63 IN

## 2023-05-25 DIAGNOSIS — M53.3 CHRONIC LEFT SI JOINT PAIN: ICD-10-CM

## 2023-05-25 DIAGNOSIS — M70.62 TROCHANTERIC BURSITIS OF LEFT HIP: ICD-10-CM

## 2023-05-25 DIAGNOSIS — M48.061 SPINAL STENOSIS OF LUMBAR REGION, UNSPECIFIED WHETHER NEUROGENIC CLAUDICATION PRESENT: ICD-10-CM

## 2023-05-25 DIAGNOSIS — M47.816 OSTEOARTHRITIS OF LUMBAR SPINE, UNSPECIFIED SPINAL OSTEOARTHRITIS COMPLICATION STATUS: ICD-10-CM

## 2023-05-25 DIAGNOSIS — G89.29 CHRONIC LEFT SI JOINT PAIN: ICD-10-CM

## 2023-05-25 DIAGNOSIS — M16.12 OSTEOARTHRITIS OF LEFT HIP, UNSPECIFIED OSTEOARTHRITIS TYPE: Primary | ICD-10-CM

## 2023-05-25 NOTE — PROGRESS NOTES
"Chief Complaint  Follow-up of the Left Hip     Subjective      Jane Huang presents to [facility] for follow up evaluation of the left hip. The patient has been treating her left hip osteoarthritis and bursitis conservatively. She recently had a lumbar MRI and is here today for those results.     Allergies   Allergen Reactions    Penicillins Anaphylaxis        Social History     Socioeconomic History    Marital status:    Tobacco Use    Smoking status: Never    Smokeless tobacco: Never   Vaping Use    Vaping Use: Never used   Substance and Sexual Activity    Alcohol use: Never    Drug use: Never    Sexual activity: Defer        Review of Systems     Objective   Vital Signs:   Pulse 84   Ht 160 cm (63\")   Wt 109 kg (240 lb)   SpO2 97%   BMI 42.51 kg/m²       Physical Exam  Constitutional:       Appearance: Normal appearance. The patient is well-developed and normal weight.   HENT:      Head: Normocephalic.      Right Ear: Hearing and external ear normal.      Left Ear: Hearing and external ear normal.      Nose: Nose normal.   Eyes:      Conjunctiva/sclera: Conjunctivae normal.   Cardiovascular:      Rate and Rhythm: Normal rate.   Pulmonary:      Effort: Pulmonary effort is normal.      Breath sounds: No wheezing or rales.   Abdominal:      Palpations: Abdomen is soft.      Tenderness: There is no abdominal tenderness.   Musculoskeletal:      Cervical back: Normal range of motion.   Skin:     Findings: No rash.   Neurological:      Mental Status: The patient is alert and oriented to person, place, and time.   Psychiatric:         Mood and Affect: Mood and affect normal.         Judgment: Judgment normal.       Ortho Exam      Left hip- Tender to the posterior hip and SI joint. No lumbar tenderness. Tender to the lateral hip. Positive EHL, FHL, GS and TA. Sensation intact to all 5 nerves of the foot. Positive pulses. Flexion 80. External Rotation 25. Internal rotation 15. " Pain with hip motion. Negative straight leg raise     Procedures      Imaging Results (Most Recent)       None             Result Review :       MRI Lumbar Spine Without Contrast    Result Date: 5/22/2023  Narrative: PROCEDURE: MRI LUMBAR SPINE WO CONTRAST  COMPARISON: None.  INDICATIONS: 73-YEAR-OLD FEMALE W/ H/O LOWER BACK PAIN W/ BURNING SENSATION, RADIATING INTO BILATERAL HIPS & LEGS (LEFT WORSE THAN THE RIGHT).  ALSO, H/O MULTIPLE FALLS DURING THE LAST 6 MONTHS, NOT OTHERWISE SPECIFIED.  TECHNIQUE: A variety of imaging planes and parameters were utilized for visualization of suspected pathology within the lumbar spine.  111 magnetic resonance (MR) images were obtained.  FINDINGS:  PARASPINAL AREA: Normal with no visible mass.  BONES: No acute fracture, pars defect, or significant osseous lesion.  CORD/CAUDA EQUINA: Normal caliber, contour, and signal intensity.  The conus medullaris terminates at the upper L1 level, which is within normal limits.  No nonenhanced MRI evidence of a cauda equina mass. OTHER: Disc degeneration is seen at multiple levels.  There is suspected chronic retrolisthesis at the L3-4 level, estimated at about 4 to 5 mm.  There may be Baastrups disease, especially at L3-4, and possibly L2-3 and L4-5.  Diffuse annular disc bulges are suspected at T11-12 and T12-L1.  These levels were not imaged axially.  Degenerative changes involve the partially imaged bilateral sacroiliac (SI) joints.  There may be incidental small bilateral renal cysts.  These findings are not fully characterized on the study.  No definite scoliosis is suspected.  LUMBAR DISC INTERSPACE LEVELS ON AXIAL MR IMAGING: L1-L2: No significant disc/facet abnormality, spinal stenosis, or foraminal stenosis.  This level was not imaged axially.  There may be partial disc degeneration. L2-L3: There is diffuse annular disc bulge.  There is moderate-to-severe facet osteoarthritis.  There is mild-to-moderate spinal canal narrowing.  No  significant neural foraminal narrowing is suspected.  No disc herniation. L3-L4: There is diffuse annular disc bulge.  There is a left central/left subarticular/left foraminal disc herniation (i.e., likely a disc extrusion), such as seen on image 17 of series 5 and adjacent images.  It may represent a disc-osteophyte complex.  It measures approximately 1.5 x 0.4 x 1.1 cm in transverse, anteroposterior (AP), and craniocaudal extent, respectively, as seen on image 17 of series 5 and images 7 and 8 of series 3.  There is moderate spinal stenosis.  There is effacement of the ventral thecal sac, greater on the left.  Moderate-to-severe facet osteoarthritis is seen.  There is subarticular narrowing on the left.  There is mild left neural foraminal narrowing and minimal (if any) right neural foraminal narrowing.  Again, chronic retrolisthesis (4 to 5 mm) is suggested at this level. L4-L5: There is diffuse annular disc bulge with moderate-to-severe spinal stenosis.  Posterior ligamentous thickening is seen.  There is moderate-to-severe facet osteoarthritis.  Subarticular narrowing is seen, greater on right.  No definite disc herniation is suspected.  There is mild bilateral neural foraminal narrowing, greater on the right. L5-S1: There is severe facet osteoarthritis.  Diffuse annular disc bulge is seen.  There is mild-to-moderate spinal canal narrowing.  No definite disc herniation is seen.  No significant neural foraminal narrowing.      Impression:   1. No acute findings are seen.  No acute vertebral compression fractures.  2. No subacute-or-chronic vertebral compression fractures are appreciated.  3. Degenerative changes are seen at multiple levels, as detailed above.  4. No distal cord signal abnormality is seen.  No cauda equina mass is suggested.  5. Except for endplate degenerative changes, no significant intraosseous lesions.  6. No definite paraspinal mass is seen.  7. Please see above comments for further detail.      Please note that portions of this note were completed with a voice recognition program.  MURRAY GUADALUPE JR, MD       Electronically Signed and Approved By: MURRAY GUADALUPE JR, MD on 5/22/2023 at 5:05                       Assessment and Plan     Diagnoses and all orders for this visit:    1. Osteoarthritis of left hip, unspecified osteoarthritis type (Primary)    2. Trochanteric bursitis of left hip    3. Chronic left SI joint pain    4. Osteoarthritis of lumbar spine, unspecified spinal osteoarthritis complication status    5. Spinal stenosis of lumbar region, unspecified whether neurogenic claudication present        Discussed the treatment plan with the patient.  I reviewed the MRI with the patient. Plan for a referral to Dr. Barreto given today. The patient expressed understanding and wished to proceed. She can not take NSAIDS.      Call or return if worsening symptoms.    Follow Up     PRN      Patient was given instructions and counseling regarding her condition or for health maintenance advice. Please see specific information pulled into the AVS if appropriate.     Scribed for Ibrahima Craven MD by Daniela Laguerre.  05/25/23   10:49 EDT    I have personally performed the services described in this document as scribed by the above individual and it is both accurate and complete. Ibrahima Craven MD 06/03/23

## 2023-06-01 ENCOUNTER — TELEPHONE (OUTPATIENT)
Dept: ORTHOPEDIC SURGERY | Facility: CLINIC | Age: 73
End: 2023-06-01

## 2023-06-01 NOTE — TELEPHONE ENCOUNTER
KILEY W/NEURO CALLED TO REQUEST DR. AUGUST SIGN THE NOTES FROM LAST APPT. SO SHE CAN PROCEED W/THE REFERRAL    SHE ALSO WANTED TO GIVE A HEADS UP THAT SUBMITTING FOR AUTH FOR SI JOINT COULD POSE A PROBLEM BUT LUMBAR IS FINE IN THE EVEN YOU NEEDED/WANTED TO REVISE THE DIAGNOSES CODES

## 2023-06-08 ENCOUNTER — OFFICE VISIT (OUTPATIENT)
Dept: FAMILY MEDICINE CLINIC | Facility: CLINIC | Age: 73
End: 2023-06-08
Payer: MEDICARE

## 2023-06-08 VITALS
DIASTOLIC BLOOD PRESSURE: 68 MMHG | WEIGHT: 237.1 LBS | BODY MASS INDEX: 42.01 KG/M2 | OXYGEN SATURATION: 97 % | HEART RATE: 79 BPM | TEMPERATURE: 96.3 F | HEIGHT: 63 IN | SYSTOLIC BLOOD PRESSURE: 132 MMHG

## 2023-06-08 DIAGNOSIS — Z78.9 VARICELLA VACCINATION STATUS UNKNOWN: ICD-10-CM

## 2023-06-08 DIAGNOSIS — F51.5 NIGHTMARES: ICD-10-CM

## 2023-06-08 DIAGNOSIS — I10 ESSENTIAL HYPERTENSION: ICD-10-CM

## 2023-06-08 DIAGNOSIS — Z79.4 TYPE 2 DIABETES MELLITUS WITH HYPERGLYCEMIA, WITH LONG-TERM CURRENT USE OF INSULIN: Primary | ICD-10-CM

## 2023-06-08 DIAGNOSIS — E78.2 MIXED HYPERLIPIDEMIA: ICD-10-CM

## 2023-06-08 DIAGNOSIS — E53.8 VITAMIN B12 DEFICIENCY: ICD-10-CM

## 2023-06-08 DIAGNOSIS — E66.01 CLASS 3 SEVERE OBESITY WITH SERIOUS COMORBIDITY AND BODY MASS INDEX (BMI) OF 40.0 TO 44.9 IN ADULT, UNSPECIFIED OBESITY TYPE: ICD-10-CM

## 2023-06-08 DIAGNOSIS — Z12.11 SCREENING FOR MALIGNANT NEOPLASM OF COLON: ICD-10-CM

## 2023-06-08 DIAGNOSIS — E11.65 TYPE 2 DIABETES MELLITUS WITH HYPERGLYCEMIA, WITH LONG-TERM CURRENT USE OF INSULIN: Primary | ICD-10-CM

## 2023-06-08 DIAGNOSIS — K21.9 GASTROESOPHAGEAL REFLUX DISEASE, UNSPECIFIED WHETHER ESOPHAGITIS PRESENT: ICD-10-CM

## 2023-06-08 PROBLEM — E66.813 CLASS 3 SEVERE OBESITY WITH SERIOUS COMORBIDITY AND BODY MASS INDEX (BMI) OF 40.0 TO 44.9 IN ADULT: Status: ACTIVE | Noted: 2023-06-08

## 2023-06-08 LAB
ALBUMIN SERPL-MCNC: 4 G/DL (ref 3.5–5.2)
ALBUMIN/GLOB SERPL: 1.2 G/DL
ALP SERPL-CCNC: 73 U/L (ref 39–117)
ALT SERPL W P-5'-P-CCNC: 29 U/L (ref 1–33)
ANION GAP SERPL CALCULATED.3IONS-SCNC: 10.7 MMOL/L (ref 5–15)
AST SERPL-CCNC: 36 U/L (ref 1–32)
BILIRUB SERPL-MCNC: 0.4 MG/DL (ref 0–1.2)
BUN SERPL-MCNC: 14 MG/DL (ref 8–23)
BUN/CREAT SERPL: 14.6 (ref 7–25)
CALCIUM SPEC-SCNC: 9.9 MG/DL (ref 8.6–10.5)
CHLORIDE SERPL-SCNC: 107 MMOL/L (ref 98–107)
CO2 SERPL-SCNC: 24.3 MMOL/L (ref 22–29)
CREAT SERPL-MCNC: 0.96 MG/DL (ref 0.57–1)
EGFRCR SERPLBLD CKD-EPI 2021: 61.8 ML/MIN/1.73
GLOBULIN UR ELPH-MCNC: 3.3 GM/DL
GLUCOSE SERPL-MCNC: 96 MG/DL (ref 65–99)
HBA1C MFR BLD: 7.5 % (ref 4.8–5.6)
POTASSIUM SERPL-SCNC: 4.3 MMOL/L (ref 3.5–5.2)
PROT SERPL-MCNC: 7.3 G/DL (ref 6–8.5)
SODIUM SERPL-SCNC: 142 MMOL/L (ref 136–145)
VIT B12 BLD-MCNC: 1005 PG/ML (ref 211–946)

## 2023-06-08 PROCEDURE — 83036 HEMOGLOBIN GLYCOSYLATED A1C: CPT | Performed by: NURSE PRACTITIONER

## 2023-06-08 PROCEDURE — 80053 COMPREHEN METABOLIC PANEL: CPT | Performed by: NURSE PRACTITIONER

## 2023-06-08 PROCEDURE — 82607 VITAMIN B-12: CPT | Performed by: NURSE PRACTITIONER

## 2023-06-08 RX ORDER — ATORVASTATIN CALCIUM 10 MG/1
10 TABLET, FILM COATED ORAL NIGHTLY
Qty: 90 TABLET | Refills: 1 | Status: SHIPPED | OUTPATIENT
Start: 2023-06-08

## 2023-06-08 RX ORDER — AMLODIPINE BESYLATE 5 MG/1
5 TABLET ORAL DAILY
Qty: 90 TABLET | Refills: 1 | Status: SHIPPED | OUTPATIENT
Start: 2023-06-08

## 2023-06-08 RX ORDER — INSULIN GLARGINE 100 [IU]/ML
65 INJECTION, SOLUTION SUBCUTANEOUS DAILY
Qty: 20 ML | Refills: 2 | Status: SHIPPED | OUTPATIENT
Start: 2023-06-08 | End: 2023-09-06

## 2023-06-08 RX ORDER — PRAZOSIN HYDROCHLORIDE 1 MG/1
1 CAPSULE ORAL NIGHTLY
Qty: 30 CAPSULE | Refills: 2 | Status: SHIPPED | OUTPATIENT
Start: 2023-06-08

## 2023-06-08 RX ORDER — BENAZEPRIL HYDROCHLORIDE 20 MG/1
20 TABLET ORAL DAILY
Qty: 90 TABLET | Refills: 1 | Status: SHIPPED | OUTPATIENT
Start: 2023-06-08

## 2023-06-08 RX ORDER — FAMOTIDINE 40 MG/1
40 TABLET, FILM COATED ORAL DAILY
Qty: 90 TABLET | Refills: 1 | Status: SHIPPED | OUTPATIENT
Start: 2023-06-08

## 2023-06-08 NOTE — PROGRESS NOTES
Answers submitted by the patient for this visit:  Primary Reason for Visit (Submitted on 6/7/2023)  What is the primary reason for your visit?: Diabetes  Diabetes Questionnaire (Submitted on 6/7/2023)  Chief Complaint: Diabetes problem  Diabetes type: type 2  MedicAlert ID: No  Disease duration: 10 Years  foot paresthesias: Yes  Symptom course: worsening  confusion: No  dizziness: No  headaches: No  hunger: No  mood changes: Yes  nervous/anxious: Yes  pallor: No  seizures: No  sleepiness: No  speech difficulty: No  sweats: Yes  tremors: Yes  blackouts: No  hospitalization: No  nocturnal hypoglycemia: No  required assistance: Yes  required glucagon: No  CVA: No  heart disease: No  impotence: No  nephropathy: No  peripheral neuropathy: Yes  PVD: No  retinopathy: No  CAD risks: family history, hypertension, obesity, post-menopausal, sedentary lifestyle, stress  Current treatments: diet, insulin injections, oral agent (monotherapy)  Treatment compliance: most of the time  Dose schedule: pre-breakfast  Given by: patient  Injection sites: abdominal wall  Home blood tests: 1-2 x per day  Home urines: <1 x per month  Monitoring compliance: excellent  Blood glucose trend: fluctuating minimally  breakfast time: 7-8 am  breakfast glucose level:   High score: 180-200  Overall: 130-140  Weight trend: fluctuating minimally  Current diet: generally unhealthy  Meal planning: avoidance of concentrated sweets, carbohydrate counting  Exercise: rarely  Dietitian visit: No  Eye exam current: Yes  Sees podiatrist: Yes  Chief Complaint  Diabetes    Subjective          Jane Huang, 73 y.o. female presents to [facility]  Diabetes  She has type 2 diabetes mellitus. No MedicAlert identification noted. The initial diagnosis of diabetes was made 10 Years ago. Hypoglycemia symptoms include mood changes, nervousness/anxiousness, sweats and tremors. Pertinent negatives for hypoglycemia include no  confusion, dizziness, headaches, hunger, pallor, seizures, sleepiness or speech difficulty. Associated symptoms include foot paresthesias. Hypoglycemia complications include required assistance. Pertinent negatives for hypoglycemia complications include no blackouts, no hospitalization, no nocturnal hypoglycemia and no required glucagon injection. Symptoms are worsening. Diabetic complications include peripheral neuropathy. Pertinent negatives for diabetic complications include no CVA, heart disease, impotence, nephropathy, PVD or retinopathy. Risk factors for coronary artery disease include family history, hypertension, obesity, post-menopausal, sedentary lifestyle and stress. Current diabetic treatment includes diet, insulin injections and oral agent (monotherapy). She is compliant with treatment most of the time. She is currently taking insulin pre-breakfast. Insulin injections are given by patient. Rotation sites for injection include the abdominal wall. Her weight is fluctuating minimally. She is following a generally unhealthy diet. Meal planning includes avoidance of concentrated sweets and carbohydrate counting. She has not had a previous visit with a dietitian. She rarely participates in exercise. She monitors blood glucose at home 1-2 x per day. She monitors urine at home <1 x per month. Blood glucose monitoring compliance is excellent. Her home blood glucose trend is fluctuating minimally. Her breakfast blood glucose is taken between 7-8 am. Her breakfast blood glucose range is generally  mg/dl. Her overall blood glucose range is 130-140 mg/dl. She sees a podiatrist.Eye exam is current.    Patient presents today for a 3-month follow-up.    Diabetes type 2, insulin-dependent: Her last A1c did increase to 8.5%.  I instructed her to increase her Lantus insulin to 75 units daily.  She states her blood sugar this morning was 78.  She is not tolerating the increase in insulin and has decreased the dose to  "70 units.  She states she feels shaky in the am, gets cold and sweaty with low BS.  She is also on glipizide 5 mg 2 tablets in the morning and 1 tablet in the evening.  She has been taking her glipizide in the evening at bedtime and not with dinner.  She states her blood sugars in the morning range from , states her highest blood sugar is between 180 and 200.  Her average blood sugars are 130-140.   Body mass index is 42 kg/m².  She has not lost any weight since her last visit.    She is due for colorectal screening.  She has not had her shingles vaccine, states she does not think she has ever had chickenpox.    Her LDL was at goal, on atorvastatin 10 mg daily.    Vitamin B12 level was slightly elevated so advised her to decrease vitamin B12 supplement to 3x/weekly.    Vitamin D level was low normal and I recommended that she increase vitamin D to 4000 units daily.    Hypertension: Blood pressure stable on amlodipine 5 mg daily and benazepril 20 mg daily.    She states she has been having nightmares that have been terrifying.  She states it is affecting her sleep.    GERD: She needs a refill on famotidine.     Tobacco Use: Low Risk     Smoking Tobacco Use: Never    Smokeless Tobacco Use: Never    Passive Exposure: Not on file      Objective   Vital Signs:   /68 (BP Location: Left arm, Patient Position: Sitting, Cuff Size: Adult)   Pulse 79   Temp 96.3 °F (35.7 °C)   Ht 160 cm (63\")   Wt 108 kg (237 lb 1.6 oz)   SpO2 97%   BMI 42.00 kg/m²       Current Outpatient Medications:     amLODIPine (NORVASC) 5 MG tablet, Take 1 tablet by mouth Daily., Disp: 90 tablet, Rfl: 1    atorvastatin (LIPITOR) 10 MG tablet, Take 1 tablet by mouth Every Night., Disp: 90 tablet, Rfl: 1    baclofen (LIORESAL) 10 MG tablet, Take 1 tablet by mouth 3 (Three) Times a Day., Disp: , Rfl:     benazepril (LOTENSIN) 20 MG tablet, Take 1 tablet by mouth Daily., Disp: 90 tablet, Rfl: 1    Calcium Carbonate 1500 (600 Ca) MG tablet, " Take 1 tablet by mouth 2 (two) times a day., Disp: , Rfl:     cetirizine (zyrTEC) 10 MG tablet, Take 1 tablet by mouth Daily., Disp: , Rfl:     colestipol (COLESTID) 1 g tablet, Take 1 tablet by mouth 2 (Two) Times a Day., Disp: 180 tablet, Rfl: 1    famotidine (PEPCID) 40 MG tablet, Take 1 tablet by mouth Daily., Disp: 90 tablet, Rfl: 1    gabapentin (NEURONTIN) 300 MG capsule, 1 capsule 3 (Three) Times a Day As Needed (pain)., Disp: , Rfl:     glipizide (GLUCOTROL) 5 MG tablet, TAKE 2 TABLETS BY MOUTH EVERY MORNING AND TAKE 1 TABLET BY MOUTH EVERY EVENING, Disp: 270 tablet, Rfl: 0    HYDROcodone-acetaminophen (NORCO)  MG per tablet, Take 1 tablet by mouth Every 12 (Twelve) Hours As Needed., Disp: , Rfl:     Insulin Glargine (Lantus SoloStar) 100 UNIT/ML injection pen, Inject 65 Units under the skin into the appropriate area as directed Daily for 90 days., Disp: 20 mL, Rfl: 2    Insulin Pen Needle (B-D ULTRAFINE III SHORT PEN) 31G X 8 MM misc, Inject 1 each under the skin into the appropriate area as directed See Admin Instructions., Disp: 100 each, Rfl: 5    meclizine 25 MG chewable tablet chewable tablet, CHEW AND SWALLOW 1 TABLET BY MOUTH EVERY 6 HOURS AS NEEDED, Disp: , Rfl:     ondansetron ODT (ZOFRAN-ODT) 4 MG disintegrating tablet, , Disp: , Rfl:     vitamin B-12 (CYANOCOBALAMIN) 1000 MCG tablet, TAKE 1 TABLET BY MOUTH EVERY DAY, Disp: 90 tablet, Rfl: 1    Vitamin D, Cholecalciferol, 50 MCG (2000 UT) capsule, Take 2 capsules by mouth Daily., Disp: 180 capsule, Rfl: 1    prazosin (MINIPRESS) 1 MG capsule, Take 1 capsule by mouth Every Night., Disp: 30 capsule, Rfl: 2   Past Medical History:   Diagnosis Date    Acid reflux     Allergies     Arthritis     Broken bones     Cataracts, bilateral     Chronic allergic rhinitis     Colitis     Depression 09/09/2020    Diabetes     Diabetes mellitus, type 2 06/11/2019    Diverticulitis     Encounter for screening breast examination     Essential hypertension  06/11/2019    Essential tremor 06/11/2019    Forgetfulness     Gall stones     GERD (gastroesophageal reflux disease) 06/02/2021    High blood pressure     HTN (hypertension), benign     Insomnia, unspecified 09/09/2020    Kidney stones     Screening for colon cancer 03/18/2013    Sinus trouble     Toe deformity 03/02/2021    Vitamin B12 deficiency 09/09/2020    Vitamin D deficiency 12/09/2020      Physical Exam  Vitals reviewed.   Constitutional:       Appearance: Normal appearance. She is well-developed. She is morbidly obese.   Neck:      Thyroid: No thyroid mass, thyromegaly or thyroid tenderness.   Cardiovascular:      Rate and Rhythm: Normal rate and regular rhythm.      Heart sounds: No murmur heard.    No friction rub. No gallop.   Pulmonary:      Effort: Pulmonary effort is normal.      Breath sounds: Normal breath sounds. No wheezing or rhonchi.   Lymphadenopathy:      Cervical: No cervical adenopathy.   Skin:     General: Skin is warm and dry.   Neurological:      Mental Status: She is alert and oriented to person, place, and time.      Cranial Nerves: No cranial nerve deficit.   Psychiatric:         Mood and Affect: Mood and affect normal.         Behavior: Behavior normal.         Thought Content: Thought content normal. Thought content does not include homicidal or suicidal ideation.         Judgment: Judgment normal.      Result Review :   {The following data was reviewed by NHUNG Ramos    MRI Lumbar Spine Without Contrast    Result Date: 5/22/2023    1. No acute findings are seen.  No acute vertebral compression fractures.  2. No subacute-or-chronic vertebral compression fractures are appreciated.  3. Degenerative changes are seen at multiple levels, as detailed above.  4. No distal cord signal abnormality is seen.  No cauda equina mass is suggested.  5. Except for endplate degenerative changes, no significant intraosseous lesions.  6. No definite paraspinal mass is seen.  7. Please see  above comments for further detail.     Please note that portions of this note were completed with a voice recognition program.  MURRAY GUADALUPE JR, MD       Electronically Signed and Approved By: MURRAY GUADALUPE JR, MD on 5/22/2023 at 5:05              MRI Hip Left Without Contrast    Result Date: 4/6/2023    1. Mild bilateral hip osteoarthritis 2. 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 3. Bilateral trochanteric and sub gluteal bursitis, left more so than right 4. No labral tear identified. 5. Bilateral enthesopathic changes.  Findings suggest a history of diffuse idiopathic skeletal hyperostosis.     Yonatan Cerrato M.D.       Electronically Signed and Approved By: Yonatan Cerrato M.D. on 4/06/2023 at 10:37             US Soft Tissue    Result Date: 3/28/2023    1. No well-defined mass or fluid collection is seen in the left lower back/gluteal region with ultrasound.  If persisting concern, consider CT follow-up CT.      JB MOULTON MD       Electronically Signed and Approved By: JB MOULTON MD on 3/28/2023 at 14:12             XR Hip With or Without Pelvis 2 - 3 View Left    Result Date: 3/28/2023    1. Mild osteoarthritis of the left hip 2. Enthesopathy involving the greater trochanter and left iliac crest      Ibrahima Hobson MD       Electronically Signed and Approved By: Ibrahima Hobson MD on 3/28/2023 at 14:10               Common Labs   Common labs          9/15/2022    14:56 12/9/2022    15:07 3/9/2023    15:59   Common Labs   Glucose 151  120  335    BUN 15  19  15    Creatinine 0.98  0.99  1.12    Sodium 139  138  137    Potassium 4.3  4.5  4.4    Chloride 103  104  101    Calcium 9.3  9.8  9.5    Albumin 4.00  3.70  3.5    Total Bilirubin 0.3  0.3  0.4    Alkaline Phosphatase 72  96  94    AST (SGOT) 36  33  36    ALT (SGPT) 30  28  39    WBC   5.58    Hemoglobin   14.3    Hematocrit   44.1    Platelets   149    Total Cholesterol 108   110     Triglycerides 116   188    HDL Cholesterol 39   36    LDL Cholesterol  48   43    Hemoglobin A1C 7.80  7.90  8.50      VITD   Lab Results   Component Value Date    THKA27TW 35.3 03/09/2023     VITB12   Lab Results   Component Value Date    WVCYFLNY76 1,624 (H) 03/09/2023            Assessment and Plan    Diagnoses and all orders for this visit:    1. Type 2 diabetes mellitus with hyperglycemia, with long-term current use of insulin (Primary)  Assessment & Plan:  Diabetes is  worsening but also having hypoglycemia .   Dietary recommendations for ADA diet.  Discussed sick day management.  Medication changes per orders.  Endocrinology clinic referral.  Since she is having hypoglycemic episodes, will have her decrease Lantus dose to 65 units daily.  I discussed with her that she has to take her glipizide with her dinner and not to wait until bedtime.  I will get her referred to the diabetic clinic for further evaluation and better management of her diabetes.  Handouts provided on diabetes and hypoglycemia, see AVS.  Diabetes will be reassessed in 3 months.    Orders:  -     Hemoglobin A1c  -     Comprehensive Metabolic Panel  -     Ambulatory Referral to [facility] [place]    2. Gastroesophageal reflux disease, unspecified whether esophagitis present  Assessment & Plan:  GERD stable on famotidine, will continue current dose.    Orders:  -     famotidine (PEPCID) 40 MG tablet; Take 1 tablet by mouth Daily.  Dispense: 90 tablet; Refill: 1    3. Essential hypertension  Assessment & Plan:  Hypertension is improving with treatment.  Continue current treatment regimen.  Weight loss.  Continue current medications.  Blood pressure will be reassessed in 3 months.    Orders:  -     Comprehensive Metabolic Panel  -     benazepril (LOTENSIN) 20 MG tablet; Take 1 tablet by mouth Daily.  Dispense: 90 tablet; Refill: 1  -     amLODIPine (NORVASC) 5 MG tablet; Take 1 tablet by mouth Daily.  Dispense: 90 tablet; Refill:  1    4. Mixed hyperlipidemia  Assessment & Plan:  Lipid abnormalities are improving with treatment.  Pharmacotherapy as ordered.  Lipids will be reassessed in 3 months.    Orders:  -     Comprehensive Metabolic Panel  -     atorvastatin (LIPITOR) 10 MG tablet; Take 1 tablet by mouth Every Night.  Dispense: 90 tablet; Refill: 1    5. Vitamin B12 deficiency  Assessment & Plan:  We will recheck vitamin B12 level today to make sure it is stable.  She will currently continue B12 supplement 3 times weekly.    Orders:  -     Vitamin B12    6. Nightmares  Assessment & Plan:  Psychological condition is newly identified.  I will start her on prazosin 1 mg at bedtime.  Psychological condition  will be reassessed in 3 months.    Orders:  -     prazosin (MINIPRESS) 1 MG capsule; Take 1 capsule by mouth Every Night.  Dispense: 30 capsule; Refill: 2    7. Screening for malignant neoplasm of colon  -     Ambulatory Referral For Screening Colonoscopy    8. Varicella vaccination status unknown  Comments:  We will check a varicella antibody today to see if she has had chickenpox in the past to determine if she needs Shingrix.  Orders:  -     Varicella Zoster Antibody, IgG    9. Class 3 severe obesity with serious comorbidity and body mass index (BMI) of 40.0 to 44.9 in adult, unspecified obesity type  Assessment & Plan:  Patient's (Body mass index is 42 kg/m².) indicates that they are morbidly/severely obese (BMI > 40 or > 35 with obesity - related health condition) with health conditions that include hypertension, diabetes mellitus, dyslipidemias, GERD, and osteoarthritis . Weight is unchanged. BMI  is above average; BMI management plan is completed. We discussed low calorie, low carb based diet program and portion control.  Handout provided, see AVS.      Other orders  -     Insulin Glargine (Lantus SoloStar) 100 UNIT/ML injection pen; Inject 65 Units under the skin into the appropriate area as directed Daily for 90 days.  Dispense:  20 mL; Refill: 2      I spent 40 minutes caring for Jane on this date of service. This time includes time spent by me in the following activities:preparing for the visit, reviewing tests, performing a medically appropriate examination and/or evaluation , counseling and educating the patient/family/caregiver, ordering medications, tests, or procedures, referring and communicating with other health care professionals , and documenting information in the medical record.  Follow Up   Return in about 3 months (around 9/8/2023) for 30 min apt for complex pt diab, HLD, HTN.  Patient was given instructions and counseling regarding her condition or for health maintenance advice. Please see specific information pulled into the AVS if appropriate.     Parts of this note are electronic transcriptions/translations of spoken language to printed text using the Dragon Dictation system.      Chrissy Collins, APRN  06/08/2023

## 2023-06-09 NOTE — ASSESSMENT & PLAN NOTE
Diabetes is  worsening but also having hypoglycemia .   Dietary recommendations for ADA diet.  Discussed sick day management.  Medication changes per orders.  Endocrinology clinic referral.  Since she is having hypoglycemic episodes, will have her decrease Lantus dose to 65 units daily.  I discussed with her that she has to take her glipizide with her dinner and not to wait until bedtime.  I will get her referred to the diabetic clinic for further evaluation and better management of her diabetes.  Handouts provided on diabetes and hypoglycemia, see AVS.  Diabetes will be reassessed in 3 months.

## 2023-06-09 NOTE — ASSESSMENT & PLAN NOTE
Patient's (Body mass index is 42 kg/m².) indicates that they are morbidly/severely obese (BMI > 40 or > 35 with obesity - related health condition) with health conditions that include hypertension, diabetes mellitus, dyslipidemias, GERD, and osteoarthritis . Weight is unchanged. BMI  is above average; BMI management plan is completed. We discussed low calorie, low carb based diet program and portion control.  Handout provided, see AVS.

## 2023-06-09 NOTE — ASSESSMENT & PLAN NOTE
We will recheck vitamin B12 level today to make sure it is stable.  She will currently continue B12 supplement 3 times weekly.

## 2023-06-09 NOTE — ASSESSMENT & PLAN NOTE
Psychological condition is newly identified.  I will start her on prazosin 1 mg at bedtime.  Psychological condition  will be reassessed in 3 months.

## 2023-06-09 NOTE — ASSESSMENT & PLAN NOTE
Hypertension is improving with treatment.  Continue current treatment regimen.  Weight loss.  Continue current medications.  Blood pressure will be reassessed in 3 months.

## 2023-07-18 ENCOUNTER — NUTRITION (OUTPATIENT)
Dept: DIABETES SERVICES | Facility: HOSPITAL | Age: 73
End: 2023-07-18
Payer: MEDICARE

## 2023-07-18 DIAGNOSIS — E11.65 UNCONTROLLED TYPE 2 DIABETES MELLITUS WITH HYPERGLYCEMIA: Primary | ICD-10-CM

## 2023-07-18 PROCEDURE — 97802 MEDICAL NUTRITION INDIV IN: CPT | Performed by: DIETITIAN, REGISTERED

## 2023-08-09 ENCOUNTER — OFFICE VISIT (OUTPATIENT)
Dept: DIABETES SERVICES | Facility: HOSPITAL | Age: 73
End: 2023-08-09
Payer: MEDICARE

## 2023-08-09 VITALS
BODY MASS INDEX: 42.3 KG/M2 | DIASTOLIC BLOOD PRESSURE: 58 MMHG | SYSTOLIC BLOOD PRESSURE: 102 MMHG | HEART RATE: 70 BPM | OXYGEN SATURATION: 95 % | WEIGHT: 238.8 LBS

## 2023-08-09 DIAGNOSIS — E11.22 TYPE 2 DIABETES MELLITUS WITH STAGE 2 CHRONIC KIDNEY DISEASE, WITH LONG-TERM CURRENT USE OF INSULIN: ICD-10-CM

## 2023-08-09 DIAGNOSIS — N18.2 TYPE 2 DIABETES MELLITUS WITH STAGE 2 CHRONIC KIDNEY DISEASE, WITH LONG-TERM CURRENT USE OF INSULIN: ICD-10-CM

## 2023-08-09 DIAGNOSIS — E66.01 SEVERE OBESITY (BMI >= 40): ICD-10-CM

## 2023-08-09 DIAGNOSIS — E11.65 UNCONTROLLED TYPE 2 DIABETES MELLITUS WITH HYPERGLYCEMIA: Primary | ICD-10-CM

## 2023-08-09 DIAGNOSIS — Z79.4 TYPE 2 DIABETES MELLITUS WITH DIABETIC NEUROPATHY, WITH LONG-TERM CURRENT USE OF INSULIN: ICD-10-CM

## 2023-08-09 DIAGNOSIS — E11.40 TYPE 2 DIABETES MELLITUS WITH DIABETIC NEUROPATHY, WITH LONG-TERM CURRENT USE OF INSULIN: ICD-10-CM

## 2023-08-09 DIAGNOSIS — Z79.4 TYPE 2 DIABETES MELLITUS WITH STAGE 2 CHRONIC KIDNEY DISEASE, WITH LONG-TERM CURRENT USE OF INSULIN: ICD-10-CM

## 2023-08-09 LAB
EXPIRATION DATE: ABNORMAL
GLUCOSE BLDC GLUCOMTR-MCNC: 156 MG/DL (ref 70–99)
HBA1C MFR BLD: 8.2 %
Lab: ABNORMAL

## 2023-08-09 PROCEDURE — 1160F RVW MEDS BY RX/DR IN RCRD: CPT | Performed by: NURSE PRACTITIONER

## 2023-08-09 PROCEDURE — 1159F MED LIST DOCD IN RCRD: CPT | Performed by: NURSE PRACTITIONER

## 2023-08-09 PROCEDURE — 3078F DIAST BP <80 MM HG: CPT | Performed by: NURSE PRACTITIONER

## 2023-08-09 PROCEDURE — 99213 OFFICE O/P EST LOW 20 MIN: CPT | Performed by: NURSE PRACTITIONER

## 2023-08-09 PROCEDURE — G0463 HOSPITAL OUTPT CLINIC VISIT: HCPCS | Performed by: NURSE PRACTITIONER

## 2023-08-09 PROCEDURE — 82948 REAGENT STRIP/BLOOD GLUCOSE: CPT | Performed by: NURSE PRACTITIONER

## 2023-08-09 PROCEDURE — 3074F SYST BP LT 130 MM HG: CPT | Performed by: NURSE PRACTITIONER

## 2023-08-09 PROCEDURE — 3052F HG A1C>EQUAL 8.0%<EQUAL 9.0%: CPT | Performed by: NURSE PRACTITIONER
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
## 2023-08-16 VITALS — BODY MASS INDEX: 42.42 KG/M2 | HEIGHT: 63 IN | WEIGHT: 239.42 LBS

## 2023-08-16 NOTE — PROGRESS NOTES
"  Jane Huang is a 73 y.o. female who presents to [facility] for nutrition consult r/t diagnosis of T2DM, diagnosed 10 years ago.  Jane Huang is referred by NHUNG Rock.    Past Medical History:   Diagnosis Date    Acid reflux     Allergies     Arthritis     Broken bones     Cataracts, bilateral     Chronic allergic rhinitis     Claustrophobia     Colitis     CTS (carpal tunnel syndrome)     Right    Depression 09/09/2020    Diabetes     Diabetes mellitus, type 2 06/11/2019    Diverticulitis     Encounter for screening breast examination     Essential hypertension 06/11/2019    Essential tremor 06/11/2019    Forgetfulness     Gall stones     GERD (gastroesophageal reflux disease) 06/02/2021    High blood pressure     HTN (hypertension), benign     Insomnia, unspecified 09/09/2020    Kidney stones     Low back pain     Lumbosacral disc disease     Screening for colon cancer 03/18/2013    Sinus trouble     Toe deformity 03/02/2021    Vitamin B12 deficiency 09/09/2020    Vitamin D deficiency 12/09/2020       Anthropometrics    160 cm (63\")  109 kg (239 lb 6.7 oz)  42.41 kg/mý    Diabetes History    Diabetes History  What type of diabetes do you have?: Type 2  Length of Diabetes Diagnosis: 10 + years  Current DM knowledge: good  Have you had diabetes education/teaching in the past?: no  Do you test your blood sugar at home?: yes  Meter type: Dexcom  Typical readings: fasting 108-136    Education Preferences    Education Preferences  What areas of diabetes would you like to learn about?: diet information    Nutrition Information    Nutrition Information  Enter everything you can remember eating in the last 24 hours (1 day): breakfast- ny sandwich/cereal; lunch- ham sandwich w/ L, T, west, chips; dinner- burger, green beans; snacks- PB crackers; beverages- diet soda, water, unsweet iced tea  What is the biggest challenge you have with your diet?: Knowledge    Education " Needs    DM Education Needs  Meter: Has own  Medication: Oral, Insulin  Healthy Eating: RD consult, Reviewed meal plan, Basic meal plan provided  Motivation: Engaged  Teaching Method: Explanation, Discussion, Handouts  Patient Response: Verbalized understanding    DM Goals    DM Goals  Healthy Eating - Goal: Today  Being Active - Goal: Today  Taking Medication - Goal: Today  Monitoring - Goal: Today  Contact Plan: Follow-up medical care      Medications    Current Outpatient Medications   Medication    amLODIPine    atorvastatin    baclofen    benazepril    Calcium Carbonate    cetirizine    colestipol    Dexcom G7 Sensor    famotidine    gabapentin    glipizide    HYDROcodone-acetaminophen    Lantus SoloStar    B-D ULTRAFINE III SHORT PEN    meclizine    ondansetron ODT    prazosin    vitamin B-12    Vitamin D (Cholecalciferol)     Labs         Lab 08/09/23  1412   HEMOGLOBIN A1C 8.2     Lab Results   Component Value Date    CHOL 110 03/09/2023    TRIG 188 (H) 03/09/2023    HDL 36 (L) 03/09/2023    LDL 43 03/09/2023 June 2023 A1c 7.5%    Nutrition counseling provided on carbohydrate counting, portion control, measuring and reading labels.  Discussed eating out and gave suggestions on controlling carbohydrate intake and making healthier food choices.     Meal Plan:     Total Carbohydrates per meal: 2-3 carb servings/meal, at least 3 meals/day  Lean protein with meals.  Limit added fats.  Snacks: 1 carbohydrate serving (</= 22 g) + 1 protein serving.     Daily exercise encouraged (as recommended by healthcare provider). Discussed the benefits of exercise in lowering blood glucose, blood pressure, cholesterol, stress and controlling body weight.     Advised to continue daily blood glucose monitoring to assist with understanding of factors affecting blood glucose and assist with management of diabetes.  Discussed and provided with target BG ranges.     Literature provided: Diabetes Nutrition Placemat, Choose Your  Foods Booklet    Dietitian contact number provided.  Patient encouraged to call with questions or concerns.     Time spent with patient: 30 minutes    Stefanie Luna RDN, LD  07/18/2023

## 2023-08-27 DIAGNOSIS — I10 ESSENTIAL HYPERTENSION: ICD-10-CM

## 2023-08-28 RX ORDER — AMLODIPINE BESYLATE 5 MG/1
5 TABLET ORAL DAILY
Qty: 90 TABLET | Refills: 1 | OUTPATIENT
Start: 2023-08-28

## 2023-08-30 DIAGNOSIS — F51.5 NIGHTMARES: ICD-10-CM

## 2023-08-30 RX ORDER — PRAZOSIN HYDROCHLORIDE 1 MG/1
1 CAPSULE ORAL NIGHTLY
Qty: 30 CAPSULE | Refills: 2 | Status: SHIPPED | OUTPATIENT
Start: 2023-08-30

## 2023-09-05 DIAGNOSIS — F51.5 NIGHTMARES: ICD-10-CM

## 2023-09-05 RX ORDER — PRAZOSIN HYDROCHLORIDE 2 MG/1
2 CAPSULE ORAL NIGHTLY
Qty: 30 CAPSULE | Refills: 2 | Status: SHIPPED | OUTPATIENT
Start: 2023-09-05

## 2023-09-15 RX ORDER — INSULIN GLARGINE 100 [IU]/ML
65 INJECTION, SOLUTION SUBCUTANEOUS DAILY
Qty: 20 ML | Refills: 2 | Status: SHIPPED | OUTPATIENT
Start: 2023-09-15 | End: 2023-12-14

## 2023-09-20 RX ORDER — MONTELUKAST SODIUM 4 MG/1
TABLET, CHEWABLE ORAL
Qty: 180 TABLET | Refills: 1 | Status: SHIPPED | OUTPATIENT
Start: 2023-09-20

## 2023-10-11 ENCOUNTER — PATIENT MESSAGE (OUTPATIENT)
Dept: DIABETES SERVICES | Facility: HOSPITAL | Age: 73
End: 2023-10-11
Payer: MEDICARE

## 2023-10-11 ENCOUNTER — HOSPITAL ENCOUNTER (EMERGENCY)
Facility: HOSPITAL | Age: 73
Discharge: HOME | End: 2023-10-11
Attending: EMERGENCY MEDICINE
Payer: MEDICARE

## 2023-10-11 ENCOUNTER — TELEPHONE (OUTPATIENT)
Dept: DIABETES SERVICES | Facility: HOSPITAL | Age: 73
End: 2023-10-11
Payer: MEDICARE

## 2023-10-11 VITALS
WEIGHT: 242.73 LBS | BODY MASS INDEX: 41.44 KG/M2 | DIASTOLIC BLOOD PRESSURE: 71 MMHG | HEART RATE: 80 BPM | HEIGHT: 64 IN | TEMPERATURE: 97.7 F | RESPIRATION RATE: 16 BRPM | SYSTOLIC BLOOD PRESSURE: 146 MMHG | OXYGEN SATURATION: 97 %

## 2023-10-11 DIAGNOSIS — E16.2 HYPOGLYCEMIA: Primary | ICD-10-CM

## 2023-10-11 DIAGNOSIS — E11.65 UNCONTROLLED TYPE 2 DIABETES MELLITUS WITH HYPERGLYCEMIA: ICD-10-CM

## 2023-10-11 LAB
ALBUMIN SERPL-MCNC: 3.5 G/DL (ref 3.5–5.2)
ALBUMIN/GLOB SERPL: 1.1 G/DL
ALP SERPL-CCNC: 92 U/L (ref 39–117)
ALT SERPL W P-5'-P-CCNC: 27 U/L (ref 1–33)
ANION GAP SERPL CALCULATED.3IONS-SCNC: 11 MMOL/L (ref 5–15)
AST SERPL-CCNC: 33 U/L (ref 1–32)
BASOPHILS # BLD AUTO: 0.03 10*3/MM3 (ref 0–0.2)
BASOPHILS NFR BLD AUTO: 0.5 % (ref 0–1.5)
BILIRUB SERPL-MCNC: 0.5 MG/DL (ref 0–1.2)
BUN SERPL-MCNC: 17 MG/DL (ref 8–23)
BUN/CREAT SERPL: 17 (ref 7–25)
CALCIUM SPEC-SCNC: 8.8 MG/DL (ref 8.6–10.5)
CHLORIDE SERPL-SCNC: 101 MMOL/L (ref 98–107)
CO2 SERPL-SCNC: 23 MMOL/L (ref 22–29)
CREAT SERPL-MCNC: 1 MG/DL (ref 0.57–1)
DEPRECATED RDW RBC AUTO: 46.6 FL (ref 37–54)
EGFRCR SERPLBLD CKD-EPI 2021: 58.9 ML/MIN/1.73
EOSINOPHIL # BLD AUTO: 0.03 10*3/MM3 (ref 0–0.4)
EOSINOPHIL NFR BLD AUTO: 0.5 % (ref 0.3–6.2)
ERYTHROCYTE [DISTWIDTH] IN BLOOD BY AUTOMATED COUNT: 13 % (ref 12.3–15.4)
GLOBULIN UR ELPH-MCNC: 3.2 GM/DL
GLUCOSE BLDC GLUCOMTR-MCNC: 233 MG/DL (ref 70–99)
GLUCOSE SERPL-MCNC: 242 MG/DL (ref 65–99)
HCT VFR BLD AUTO: 42.5 % (ref 34–46.6)
HGB BLD-MCNC: 13.4 G/DL (ref 12–15.9)
HOLD SPECIMEN: NORMAL
HOLD SPECIMEN: NORMAL
IMM GRANULOCYTES # BLD AUTO: 0.02 10*3/MM3 (ref 0–0.05)
IMM GRANULOCYTES NFR BLD AUTO: 0.3 % (ref 0–0.5)
LYMPHOCYTES # BLD AUTO: 0.68 10*3/MM3 (ref 0.7–3.1)
LYMPHOCYTES NFR BLD AUTO: 11.1 % (ref 19.6–45.3)
MCH RBC QN AUTO: 30.9 PG (ref 26.6–33)
MCHC RBC AUTO-ENTMCNC: 31.5 G/DL (ref 31.5–35.7)
MCV RBC AUTO: 97.9 FL (ref 79–97)
MONOCYTES # BLD AUTO: 0.39 10*3/MM3 (ref 0.1–0.9)
MONOCYTES NFR BLD AUTO: 6.3 % (ref 5–12)
NEUTROPHILS NFR BLD AUTO: 5 10*3/MM3 (ref 1.7–7)
NEUTROPHILS NFR BLD AUTO: 81.3 % (ref 42.7–76)
NRBC BLD AUTO-RTO: 0 /100 WBC (ref 0–0.2)
PLATELET # BLD AUTO: 133 10*3/MM3 (ref 140–450)
PMV BLD AUTO: 10.9 FL (ref 6–12)
POTASSIUM SERPL-SCNC: 4.4 MMOL/L (ref 3.5–5.2)
PROT SERPL-MCNC: 6.7 G/DL (ref 6–8.5)
RBC # BLD AUTO: 4.34 10*6/MM3 (ref 3.77–5.28)
SODIUM SERPL-SCNC: 135 MMOL/L (ref 136–145)
TSH SERPL DL<=0.05 MIU/L-ACNC: 2.8 UIU/ML (ref 0.27–4.2)
WBC NRBC COR # BLD: 6.15 10*3/MM3 (ref 3.4–10.8)
WHOLE BLOOD HOLD COAG: NORMAL
WHOLE BLOOD HOLD SPECIMEN: NORMAL

## 2023-10-11 PROCEDURE — 36415 COLL VENOUS BLD VENIPUNCTURE: CPT

## 2023-10-11 PROCEDURE — 85025 COMPLETE CBC W/AUTO DIFF WBC: CPT

## 2023-10-11 PROCEDURE — 80053 COMPREHEN METABOLIC PANEL: CPT

## 2023-10-11 PROCEDURE — 99282 EMERGENCY DEPT VISIT SF MDM: CPT

## 2023-10-11 PROCEDURE — 82948 REAGENT STRIP/BLOOD GLUCOSE: CPT

## 2023-10-11 PROCEDURE — 84443 ASSAY THYROID STIM HORMONE: CPT

## 2023-10-11 RX ORDER — GLIPIZIDE 5 MG/1
TABLET ORAL
Qty: 180 TABLET | Refills: 10 | Status: SHIPPED | OUTPATIENT
Start: 2023-10-11

## 2023-10-11 NOTE — TELEPHONE ENCOUNTER
Patients daughter called and stated that patient has been having crazy blood sugar behavior, patients daughter stated that her blood sugar was 104 when she woke up and then dropped to 54, patient was wet to the touch and cold, clammy and did not want to stay awake, patients blood sugar came up to 94 and then to 124, patients daughter would like to know what to do, patients daughter would like to know what to do patient is only wanting to sleep and would like to avoid ED.
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
## 2023-10-11 NOTE — DISCHARGE INSTRUCTIONS
Continue to monitor glucose closely over the next few days.  Make sure you eat after taking insulin in the mornings.  Return to ED for any worsening of symptoms.

## 2023-10-12 RX ORDER — GLUCAGON INJECTION, SOLUTION 1 MG/.2ML
1 INJECTION, SOLUTION SUBCUTANEOUS AS NEEDED
Qty: 0.2 ML | Refills: 2 | Status: SHIPPED | OUTPATIENT
Start: 2023-10-12

## 2023-10-13 NOTE — TELEPHONE ENCOUNTER
Attempted to contact patient no answer left voice message stating to have patient call office back with how blood sugar is doing.

## 2023-10-18 ENCOUNTER — LAB (OUTPATIENT)
Dept: LAB | Facility: HOSPITAL | Age: 73
End: 2023-10-18
Payer: MEDICARE

## 2023-10-18 DIAGNOSIS — R35.0 URINARY FREQUENCY: ICD-10-CM

## 2023-10-18 DIAGNOSIS — R35.0 URINARY FREQUENCY: Primary | ICD-10-CM

## 2023-10-18 LAB
BACTERIA UR QL AUTO: NORMAL /HPF
BILIRUB UR QL STRIP: NEGATIVE
CLARITY UR: ABNORMAL
COLOR UR: ABNORMAL
GLUCOSE UR STRIP-MCNC: ABNORMAL MG/DL
HGB UR QL STRIP.AUTO: NEGATIVE
HOLD SPECIMEN: NORMAL
HYALINE CASTS UR QL AUTO: NORMAL /LPF
KETONES UR QL STRIP: ABNORMAL
LEUKOCYTE ESTERASE UR QL STRIP.AUTO: NEGATIVE
NITRITE UR QL STRIP: NEGATIVE
PH UR STRIP.AUTO: 5.5 [PH] (ref 5–8)
PROT UR QL STRIP: ABNORMAL
RBC # UR STRIP: NORMAL /HPF
REF LAB TEST METHOD: NORMAL
SP GR UR STRIP: >=1.03 (ref 1–1.03)
SQUAMOUS #/AREA URNS HPF: NORMAL /HPF
UROBILINOGEN UR QL STRIP: ABNORMAL
WBC # UR STRIP: NORMAL /HPF

## 2023-10-18 PROCEDURE — 81001 URINALYSIS AUTO W/SCOPE: CPT

## 2023-10-28 DIAGNOSIS — K21.9 GASTROESOPHAGEAL REFLUX DISEASE, UNSPECIFIED WHETHER ESOPHAGITIS PRESENT: ICD-10-CM

## 2023-10-30 RX ORDER — FAMOTIDINE 40 MG/1
40 TABLET, FILM COATED ORAL DAILY
Qty: 90 TABLET | Refills: 1 | Status: SHIPPED | OUTPATIENT
Start: 2023-10-30

## 2023-10-30 RX ORDER — INSULIN GLARGINE 100 [IU]/ML
65 INJECTION, SOLUTION SUBCUTANEOUS DAILY
Qty: 20 ML | Refills: 2 | Status: SHIPPED | OUTPATIENT
Start: 2023-10-30 | End: 2024-01-28

## 2023-11-09 ENCOUNTER — OFFICE VISIT (OUTPATIENT)
Dept: DIABETES SERVICES | Facility: HOSPITAL | Age: 73
End: 2023-11-09
Payer: MEDICARE

## 2023-11-09 VITALS
OXYGEN SATURATION: 95 % | WEIGHT: 238.6 LBS | DIASTOLIC BLOOD PRESSURE: 60 MMHG | SYSTOLIC BLOOD PRESSURE: 108 MMHG | BODY MASS INDEX: 40.96 KG/M2 | HEART RATE: 68 BPM

## 2023-11-09 DIAGNOSIS — Z79.4 TYPE 2 DIABETES MELLITUS WITH STAGE 3A CHRONIC KIDNEY DISEASE, WITH LONG-TERM CURRENT USE OF INSULIN: ICD-10-CM

## 2023-11-09 DIAGNOSIS — N18.31 TYPE 2 DIABETES MELLITUS WITH STAGE 3A CHRONIC KIDNEY DISEASE, WITH LONG-TERM CURRENT USE OF INSULIN: ICD-10-CM

## 2023-11-09 DIAGNOSIS — Z79.4 TYPE 2 DIABETES MELLITUS WITH DIABETIC NEUROPATHY, WITH LONG-TERM CURRENT USE OF INSULIN: ICD-10-CM

## 2023-11-09 DIAGNOSIS — E11.65 UNCONTROLLED TYPE 2 DIABETES MELLITUS WITH HYPERGLYCEMIA: Primary | ICD-10-CM

## 2023-11-09 DIAGNOSIS — E11.40 TYPE 2 DIABETES MELLITUS WITH DIABETIC NEUROPATHY, WITH LONG-TERM CURRENT USE OF INSULIN: ICD-10-CM

## 2023-11-09 DIAGNOSIS — E11.22 TYPE 2 DIABETES MELLITUS WITH STAGE 3A CHRONIC KIDNEY DISEASE, WITH LONG-TERM CURRENT USE OF INSULIN: ICD-10-CM

## 2023-11-09 DIAGNOSIS — E66.01 SEVERE OBESITY (BMI >= 40): ICD-10-CM

## 2023-11-09 LAB
EXPIRATION DATE: ABNORMAL
GLUCOSE BLDC GLUCOMTR-MCNC: 142 MG/DL (ref 70–99)
HBA1C MFR BLD: 7.4 % (ref 4.5–5.7)
Lab: ABNORMAL

## 2023-11-09 PROCEDURE — G0463 HOSPITAL OUTPT CLINIC VISIT: HCPCS | Performed by: NURSE PRACTITIONER

## 2023-11-09 PROCEDURE — 82948 REAGENT STRIP/BLOOD GLUCOSE: CPT | Performed by: NURSE PRACTITIONER

## 2023-11-09 RX ORDER — LANCETS 30 GAUGE
EACH MISCELLANEOUS
Qty: 300 EACH | Refills: 5 | Status: SHIPPED | OUTPATIENT
Start: 2023-11-09

## 2023-11-09 RX ORDER — INSULIN GLARGINE 100 [IU]/ML
65 INJECTION, SOLUTION SUBCUTANEOUS DAILY
Qty: 59 ML | Refills: 1 | Status: SHIPPED | OUTPATIENT
Start: 2023-11-09 | End: 2024-05-09

## 2023-11-09 RX ORDER — LATANOPROST 50 UG/ML
1 SOLUTION/ DROPS OPHTHALMIC NIGHTLY
COMMUNITY
Start: 2023-08-15

## 2023-11-09 NOTE — PATIENT INSTRUCTIONS
Start Jardiance 10 mg once daily.  Make sure to drink plenty of water with this medication to avoid dehydration and yeast infections.  If you develop any of the symptoms please contact our office.
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
## 2023-11-10 ENCOUNTER — OFFICE VISIT (OUTPATIENT)
Dept: FAMILY MEDICINE CLINIC | Facility: CLINIC | Age: 73
End: 2023-11-10
Payer: MEDICARE

## 2023-11-10 VITALS
WEIGHT: 237.2 LBS | DIASTOLIC BLOOD PRESSURE: 56 MMHG | SYSTOLIC BLOOD PRESSURE: 122 MMHG | HEIGHT: 64 IN | BODY MASS INDEX: 40.49 KG/M2 | TEMPERATURE: 96.7 F | OXYGEN SATURATION: 98 % | HEART RATE: 78 BPM

## 2023-11-10 DIAGNOSIS — R40.0 DAYTIME SLEEPINESS: ICD-10-CM

## 2023-11-10 DIAGNOSIS — E55.9 VITAMIN D DEFICIENCY: ICD-10-CM

## 2023-11-10 DIAGNOSIS — E11.65 TYPE 2 DIABETES MELLITUS WITH HYPERGLYCEMIA, WITH LONG-TERM CURRENT USE OF INSULIN: ICD-10-CM

## 2023-11-10 DIAGNOSIS — D69.6 PLATELETS DECREASED: ICD-10-CM

## 2023-11-10 DIAGNOSIS — F51.5 NIGHTMARES: ICD-10-CM

## 2023-11-10 DIAGNOSIS — Z23 NEED FOR INFLUENZA VACCINATION: ICD-10-CM

## 2023-11-10 DIAGNOSIS — E78.2 MIXED HYPERLIPIDEMIA: ICD-10-CM

## 2023-11-10 DIAGNOSIS — Z29.11 NEED FOR RSV IMMUNIZATION: ICD-10-CM

## 2023-11-10 DIAGNOSIS — I10 ESSENTIAL HYPERTENSION: Primary | ICD-10-CM

## 2023-11-10 DIAGNOSIS — Z23 NEED FOR SHINGLES VACCINE: ICD-10-CM

## 2023-11-10 DIAGNOSIS — Z79.4 TYPE 2 DIABETES MELLITUS WITH HYPERGLYCEMIA, WITH LONG-TERM CURRENT USE OF INSULIN: ICD-10-CM

## 2023-11-10 LAB
25(OH)D3 SERPL-MCNC: 34.9 NG/ML (ref 30–100)
ALBUMIN SERPL-MCNC: 3.9 G/DL (ref 3.5–5.2)
ALBUMIN UR-MCNC: <1.2 MG/DL
ALBUMIN/GLOB SERPL: 1.1 G/DL
ALP SERPL-CCNC: 87 U/L (ref 39–117)
ALT SERPL W P-5'-P-CCNC: 28 U/L (ref 1–33)
ANION GAP SERPL CALCULATED.3IONS-SCNC: 10.2 MMOL/L (ref 5–15)
AST SERPL-CCNC: 35 U/L (ref 1–32)
BASOPHILS # BLD AUTO: 0.04 10*3/MM3 (ref 0–0.2)
BASOPHILS NFR BLD AUTO: 0.7 % (ref 0–1.5)
BILIRUB SERPL-MCNC: 0.5 MG/DL (ref 0–1.2)
BUN SERPL-MCNC: 16 MG/DL (ref 8–23)
BUN/CREAT SERPL: 15.1 (ref 7–25)
CALCIUM SPEC-SCNC: 9.4 MG/DL (ref 8.6–10.5)
CHLORIDE SERPL-SCNC: 103 MMOL/L (ref 98–107)
CHOLEST SERPL-MCNC: 110 MG/DL (ref 0–200)
CO2 SERPL-SCNC: 25.8 MMOL/L (ref 22–29)
CREAT SERPL-MCNC: 1.06 MG/DL (ref 0.57–1)
CREAT UR-MCNC: 96.3 MG/DL
DEPRECATED RDW RBC AUTO: 42.9 FL (ref 37–54)
EGFRCR SERPLBLD CKD-EPI 2021: 54.9 ML/MIN/1.73
EOSINOPHIL # BLD AUTO: 0.09 10*3/MM3 (ref 0–0.4)
EOSINOPHIL NFR BLD AUTO: 1.7 % (ref 0.3–6.2)
ERYTHROCYTE [DISTWIDTH] IN BLOOD BY AUTOMATED COUNT: 12.2 % (ref 12.3–15.4)
GLOBULIN UR ELPH-MCNC: 3.6 GM/DL
GLUCOSE SERPL-MCNC: 107 MG/DL (ref 65–99)
HCT VFR BLD AUTO: 44.8 % (ref 34–46.6)
HDLC SERPL-MCNC: 40 MG/DL (ref 40–60)
HGB BLD-MCNC: 14.8 G/DL (ref 12–15.9)
IMM GRANULOCYTES # BLD AUTO: 0.01 10*3/MM3 (ref 0–0.05)
IMM GRANULOCYTES NFR BLD AUTO: 0.2 % (ref 0–0.5)
LDLC SERPL CALC-MCNC: 52 MG/DL (ref 0–100)
LDLC/HDLC SERPL: 1.28 {RATIO}
LYMPHOCYTES # BLD AUTO: 1.79 10*3/MM3 (ref 0.7–3.1)
LYMPHOCYTES NFR BLD AUTO: 32.8 % (ref 19.6–45.3)
MCH RBC QN AUTO: 31.5 PG (ref 26.6–33)
MCHC RBC AUTO-ENTMCNC: 33 G/DL (ref 31.5–35.7)
MCV RBC AUTO: 95.3 FL (ref 79–97)
MICROALBUMIN/CREAT UR: NORMAL MG/G{CREAT}
MONOCYTES # BLD AUTO: 0.31 10*3/MM3 (ref 0.1–0.9)
MONOCYTES NFR BLD AUTO: 5.7 % (ref 5–12)
NEUTROPHILS NFR BLD AUTO: 3.21 10*3/MM3 (ref 1.7–7)
NEUTROPHILS NFR BLD AUTO: 58.9 % (ref 42.7–76)
NRBC BLD AUTO-RTO: 0 /100 WBC (ref 0–0.2)
PLATELET # BLD AUTO: 150 10*3/MM3 (ref 140–450)
PMV BLD AUTO: 12.6 FL (ref 6–12)
POTASSIUM SERPL-SCNC: 4.1 MMOL/L (ref 3.5–5.2)
PROT SERPL-MCNC: 7.5 G/DL (ref 6–8.5)
RBC # BLD AUTO: 4.7 10*6/MM3 (ref 3.77–5.28)
SODIUM SERPL-SCNC: 139 MMOL/L (ref 136–145)
TRIGL SERPL-MCNC: 94 MG/DL (ref 0–150)
VLDLC SERPL-MCNC: 18 MG/DL (ref 5–40)
WBC NRBC COR # BLD: 5.45 10*3/MM3 (ref 3.4–10.8)

## 2023-11-10 PROCEDURE — 3078F DIAST BP <80 MM HG: CPT | Performed by: NURSE PRACTITIONER

## 2023-11-10 PROCEDURE — 3051F HG A1C>EQUAL 7.0%<8.0%: CPT | Performed by: NURSE PRACTITIONER

## 2023-11-10 PROCEDURE — 85025 COMPLETE CBC W/AUTO DIFF WBC: CPT | Performed by: NURSE PRACTITIONER

## 2023-11-10 PROCEDURE — 82570 ASSAY OF URINE CREATININE: CPT | Performed by: NURSE PRACTITIONER

## 2023-11-10 PROCEDURE — 1160F RVW MEDS BY RX/DR IN RCRD: CPT | Performed by: NURSE PRACTITIONER

## 2023-11-10 PROCEDURE — 90662 IIV NO PRSV INCREASED AG IM: CPT | Performed by: NURSE PRACTITIONER

## 2023-11-10 PROCEDURE — G0008 ADMIN INFLUENZA VIRUS VAC: HCPCS | Performed by: NURSE PRACTITIONER

## 2023-11-10 PROCEDURE — 80061 LIPID PANEL: CPT | Performed by: NURSE PRACTITIONER

## 2023-11-10 PROCEDURE — 82306 VITAMIN D 25 HYDROXY: CPT | Performed by: NURSE PRACTITIONER

## 2023-11-10 PROCEDURE — 99214 OFFICE O/P EST MOD 30 MIN: CPT | Performed by: NURSE PRACTITIONER

## 2023-11-10 PROCEDURE — 36415 COLL VENOUS BLD VENIPUNCTURE: CPT | Performed by: NURSE PRACTITIONER

## 2023-11-10 PROCEDURE — 82043 UR ALBUMIN QUANTITATIVE: CPT | Performed by: NURSE PRACTITIONER

## 2023-11-10 PROCEDURE — 3074F SYST BP LT 130 MM HG: CPT | Performed by: NURSE PRACTITIONER

## 2023-11-10 PROCEDURE — 1159F MED LIST DOCD IN RCRD: CPT | Performed by: NURSE PRACTITIONER

## 2023-11-10 PROCEDURE — 80053 COMPREHEN METABOLIC PANEL: CPT | Performed by: NURSE PRACTITIONER

## 2023-11-10 RX ORDER — ZOSTER VACCINE RECOMBINANT, ADJUVANTED 50 MCG/0.5
0.5 KIT INTRAMUSCULAR ONCE
Qty: 1 EACH | Refills: 1 | Status: SHIPPED | OUTPATIENT
Start: 2023-11-10 | End: 2023-11-10

## 2023-11-10 NOTE — ASSESSMENT & PLAN NOTE
I advised that she needs to have a sleep study.  She is reluctant and states she would like to wait till she gets some other things taken care of.  Her daughter and I both discussed it with her and recommend that she go ahead and get referred to the sleep doctor and she has agreed.

## 2023-11-10 NOTE — ASSESSMENT & PLAN NOTE
Diabetes is improving with treatment.   Continue current treatment regimen.  She will continue following with the diabetic clinic.  Diabetes will be reassessed in 3 months.

## 2023-11-10 NOTE — PROGRESS NOTES
"Chief Complaint  Hypertension, Hyperlipidemia, and Diabetes    Subjective          Jane Huang, 73 y.o. female presents to [facility]  History of Present Illness   For 3-month follow-up.  She is accompanied by her daughter Val Michaud.    Her daughter states that she has been falling asleep in the middle of conversations and is snoring.  She still has nightmares but she does not remember them.  Her daughter says sometimes she screams in her sleep.  I had started her on prazosin 1 mg at bedtime for her nightmares but its not helping enough.    Diabetes type 2, insulin-dependent: She is following with the diabetic clinic.  Her A1c was checked yesterday and had improved to 7.4%. She was started on Jardiance. Glipizide was stopped due to some severe hypoglycemic episodes.  She is still on Lantus insulin also.    She ended up in the emergency room on 10/18/2023.  It was noted on a CBC that she had some decreased platelet count.    She has been following with pain management and she is getting an ablation in her spine.     She has not had her shingles vaccine, states she does not think she has ever had chickenpox.     Her LDL was at goal, on atorvastatin 10 mg daily.  She is due for lipid panel.     Vitamin B12 level was slightly elevated so advised her to decrease vitamin B12 supplement to 3x/weekly.     Vitamin D level was low normal and I recommended that she increase vitamin D to 4000 units daily.     Hypertension: Blood pressure stable on amlodipine 5 mg daily and benazepril 20 mg daily.     GERD: She needs a refill on famotidine.       Tobacco Use: Low Risk  (11/10/2023)    Patient History     Smoking Tobacco Use: Never     Smokeless Tobacco Use: Never     Passive Exposure: Not on file      Objective   Vital Signs:   /56   Pulse 78   Temp 96.7 °F (35.9 °C)   Ht 162.6 cm (64\")   Wt 108 kg (237 lb 3.2 oz)   SpO2 98%   BMI 40.72 kg/m²       Current Outpatient " Medications:     amLODIPine (NORVASC) 5 MG tablet, Take 1 tablet by mouth Daily., Disp: 90 tablet, Rfl: 1    atorvastatin (LIPITOR) 10 MG tablet, Take 1 tablet by mouth Every Night., Disp: 90 tablet, Rfl: 1    baclofen (LIORESAL) 10 MG tablet, Take 1 tablet by mouth 3 (Three) Times a Day., Disp: , Rfl:     benazepril (LOTENSIN) 20 MG tablet, Take 1 tablet by mouth Daily., Disp: 90 tablet, Rfl: 1    Blood Glucose Monitoring Suppl device, Use as directed for blood glucose monitoring, Disp: 1 each, Rfl: 0    Calcium Carbonate 1500 (600 Ca) MG tablet, Take 1 tablet by mouth 2 (two) times a day., Disp: , Rfl:     cetirizine (zyrTEC) 10 MG tablet, Take 1 tablet by mouth Daily., Disp: , Rfl:     colestipol (COLESTID) 1 g tablet, TAKE 1 TABLET TWICE DAILY, Disp: 180 tablet, Rfl: 1    empagliflozin (Jardiance) 10 MG tablet tablet, Take 1 tablet by mouth Daily for 30 days., Disp: 30 tablet, Rfl: 0    famotidine (PEPCID) 40 MG tablet, TAKE 1 TABLET EVERY DAY, Disp: 90 tablet, Rfl: 1    gabapentin (NEURONTIN) 300 MG capsule, 1 capsule 3 (Three) Times a Day As Needed (pain)., Disp: , Rfl:     Glucagon (Gvoke HypoPen 2-Pack) 1 MG/0.2ML solution auto-injector, Inject 1 mg under the skin into the appropriate area as directed As Needed (Severe hypoglycemia)., Disp: 0.2 mL, Rfl: 2    glucose blood test strip, Test blood glucose 3 times each day, Disp: 300 each, Rfl: 5    HYDROcodone-acetaminophen (NORCO)  MG per tablet, Take 1 tablet by mouth Every 12 (Twelve) Hours As Needed., Disp: , Rfl:     Insulin Glargine (Lantus SoloStar) 100 UNIT/ML injection pen, Inject 65 Units under the skin into the appropriate area as directed Daily for 182 days., Disp: 59 mL, Rfl: 1    Insulin Pen Needle (B-D ULTRAFINE III SHORT PEN) 31G X 8 MM misc, Inject 1 each under the skin into the appropriate area as directed See Admin Instructions., Disp: 100 each, Rfl: 5    Lancets misc, Test blood glucose 3 times each day, Disp: 300 each, Rfl: 5     latanoprost (XALATAN) 0.005 % ophthalmic solution, Administer 1 drop to both eyes Every Night., Disp: , Rfl:     meclizine 25 MG chewable tablet chewable tablet, CHEW AND SWALLOW 1 TABLET BY MOUTH EVERY 6 HOURS AS NEEDED, Disp: , Rfl:     ondansetron ODT (ZOFRAN-ODT) 4 MG disintegrating tablet, , Disp: , Rfl:     prazosin (MINIPRESS) 2 MG capsule, Take 1 capsule by mouth Every Night., Disp: 30 capsule, Rfl: 2    vitamin B-12 (CYANOCOBALAMIN) 1000 MCG tablet, TAKE 1 TABLET BY MOUTH EVERY DAY, Disp: 90 tablet, Rfl: 1    Vitamin D, Cholecalciferol, 50 MCG (2000 UT) capsule, Take 2 capsules by mouth Daily., Disp: 180 capsule, Rfl: 1    RSVPreF3 Vac Recomb Adjuvanted (AREXVY) 120 MCG/0.5ML reconstituted suspension injection, Inject 0.5 mL into the appropriate muscle as directed by prescriber 1 (One) Time for 1 dose., Disp: 0.5 mL, Rfl: 0    Zoster Vac Recomb Adjuvanted (Shingrix) 50 MCG/0.5ML reconstituted suspension, Inject 0.5 mL into the appropriate muscle as directed by prescriber 1 (One) Time for 1 dose., Disp: 1 each, Rfl: 1   Past Medical History:   Diagnosis Date    Acid reflux     Allergies     Arthritis     Broken bones     Cataracts, bilateral     Chronic allergic rhinitis     Claustrophobia     Colitis     CTS (carpal tunnel syndrome)     Right    Depression 09/09/2020    Diabetes     Diabetes mellitus, type 2 06/11/2019    Diverticulitis     Encounter for screening breast examination     Essential hypertension 06/11/2019    Essential tremor 06/11/2019    Forgetfulness     Gall stones     GERD (gastroesophageal reflux disease) 06/02/2021    High blood pressure     HTN (hypertension), benign     Insomnia, unspecified 09/09/2020    Kidney stones     Low back pain     Lumbosacral disc disease     Mixed hyperlipidemia 09/15/2022    Screening for colon cancer 03/18/2013    Sinus trouble     Toe deformity 03/02/2021    Vitamin B12 deficiency 09/09/2020    Vitamin D deficiency 12/09/2020      Physical Exam  Vitals  reviewed.   Constitutional:       Appearance: Normal appearance. She is well-developed. She is morbidly obese.   Neck:      Thyroid: No thyroid mass, thyromegaly or thyroid tenderness.   Cardiovascular:      Rate and Rhythm: Normal rate and regular rhythm.      Heart sounds: No murmur heard.     No friction rub. No gallop.   Pulmonary:      Effort: Pulmonary effort is normal.      Breath sounds: Normal breath sounds. No wheezing or rhonchi.   Lymphadenopathy:      Cervical: No cervical adenopathy.   Skin:     General: Skin is warm and dry.   Neurological:      Mental Status: She is alert and oriented to person, place, and time.      Cranial Nerves: No cranial nerve deficit.   Psychiatric:         Mood and Affect: Mood and affect normal.         Behavior: Behavior normal.         Thought Content: Thought content normal. Thought content does not include homicidal or suicidal ideation.         Judgment: Judgment normal.        Result Review :   {The following data was reviewed by NHUNG Ramos    Common Labs   Common labs          8/9/2023    14:12 10/11/2023    13:47 11/9/2023    14:06   Common Labs   Glucose  242     BUN  17     Creatinine  1.00     Sodium  135     Potassium  4.4     Chloride  101     Calcium  8.8     Albumin  3.5     Total Bilirubin  0.5     Alkaline Phosphatase  92     AST (SGOT)  33     ALT (SGPT)  27     WBC  6.15     Hemoglobin  13.4     Hematocrit  42.5     Platelets  133     Hemoglobin A1C 8.2   7.4      TSH   TSH          3/9/2023    15:59 10/11/2023    13:47   TSH   TSH 1.300  2.800      VITD   Lab Results   Component Value Date    VLVW47VL 35.3 03/09/2023            Assessment and Plan    Diagnoses and all orders for this visit:    1. Essential hypertension (Primary)  Assessment & Plan:  Hypertension is improving with treatment.  Continue current treatment regimen.  Continue current medications.  Blood pressure will be reassessed in 3 months.      2. Need for influenza  vaccination  -     Fluzone High-Dose 65+yrs    3. Vitamin D deficiency  Assessment & Plan:  Currently on vitamin D, will check vitamin D level today.    Orders:  -     Vitamin D,25-Hydroxy    4. Type 2 diabetes mellitus with hyperglycemia, with long-term current use of insulin  Assessment & Plan:  Diabetes is improving with treatment.   Continue current treatment regimen.  She will continue following with the diabetic clinic.  Diabetes will be reassessed in 3 months.    Orders:  -     Microalbumin / Creatinine Urine Ratio - Urine, Clean Catch  -     CBC Auto Differential  -     Comprehensive Metabolic Panel    5. Mixed hyperlipidemia  Assessment & Plan:  Lipid abnormalities are improving with treatment.  Pharmacotherapy as ordered.  Lipids will be reassessed in 6 months.    Orders:  -     Comprehensive Metabolic Panel  -     Lipid Panel    6. Platelets decreased  Comments:  We will recheck a CBC today.    7. Daytime sleepiness  Assessment & Plan:  I advised that she needs to have a sleep study.  She is reluctant and states she would like to wait till she gets some other things taken care of.  Her daughter and I both discussed it with her and recommend that she go ahead and get referred to the sleep doctor and she has agreed.    Orders:  -     Ambulatory Referral to Sleep Medicine    8. Nightmares  Assessment & Plan:  Nightmares are only mildly improved with prazosin.  I am referring her to the sleep clinic.    Orders:  -     Ambulatory Referral to Sleep Medicine    9. Need for RSV immunization  Comments:  Discussed RSV vaccine, prescription sent.  Orders:  -     RSVPreF3 Vac Recomb Adjuvanted (AREXVY) 120 MCG/0.5ML reconstituted suspension injection; Inject 0.5 mL into the appropriate muscle as directed by prescriber 1 (One) Time for 1 dose.  Dispense: 0.5 mL; Refill: 0    10. Need for shingles vaccine  Comments:  We did discuss that she can still have Shingrix even if she has not had chickenpox.  Handout provided,  prescription sent.  Orders:  -     Zoster Vac Recomb Adjuvanted (Shingrix) 50 MCG/0.5ML reconstituted suspension; Inject 0.5 mL into the appropriate muscle as directed by prescriber 1 (One) Time for 1 dose.  Dispense: 1 each; Refill: 1        Follow Up   Return in about 3 months (around 2/10/2024) for 30 min apt for complex pt.  Patient was given instructions and counseling regarding her condition or for health maintenance advice. Please see specific information pulled into the AVS if appropriate.     Parts of this note are electronic transcriptions/translations of spoken language to printed text using the Dragon Dictation system.      Chrissy Collins, APRN  11/10/2023

## 2023-11-12 DIAGNOSIS — E55.9 VITAMIN D DEFICIENCY: ICD-10-CM

## 2023-11-13 RX ORDER — MULTIVIT-MIN/IRON/FOLIC ACID/K 18-600-40
4000 CAPSULE ORAL DAILY
Qty: 180 CAPSULE | Refills: 0 | Status: SHIPPED | OUTPATIENT
Start: 2023-11-13

## 2023-11-13 RX ORDER — PRAZOSIN HYDROCHLORIDE 1 MG/1
1 CAPSULE ORAL NIGHTLY
Qty: 90 CAPSULE | Refills: 10 | OUTPATIENT
Start: 2023-11-13

## 2023-11-17 ENCOUNTER — PATIENT ROUNDING (BHMG ONLY) (OUTPATIENT)
Dept: FAMILY MEDICINE CLINIC | Facility: CLINIC | Age: 73
End: 2023-11-17
Payer: MEDICARE

## 2023-11-17 NOTE — PROGRESS NOTES
A My-Chart message has been sent to the patient for PATIENT ROUNDING with [facility].

## 2023-11-30 DIAGNOSIS — E11.40 TYPE 2 DIABETES MELLITUS WITH DIABETIC NEUROPATHY, WITH LONG-TERM CURRENT USE OF INSULIN: ICD-10-CM

## 2023-11-30 DIAGNOSIS — Z79.4 TYPE 2 DIABETES MELLITUS WITH DIABETIC NEUROPATHY, WITH LONG-TERM CURRENT USE OF INSULIN: ICD-10-CM

## 2023-12-01 DIAGNOSIS — F51.5 NIGHTMARES: ICD-10-CM

## 2023-12-01 RX ORDER — PRAZOSIN HYDROCHLORIDE 2 MG/1
2 CAPSULE ORAL NIGHTLY
Qty: 30 CAPSULE | Refills: 2 | Status: SHIPPED | OUTPATIENT
Start: 2023-12-01

## 2023-12-15 ENCOUNTER — TELEPHONE (OUTPATIENT)
Dept: FAMILY MEDICINE CLINIC | Facility: CLINIC | Age: 73
End: 2023-12-15

## 2023-12-15 NOTE — TELEPHONE ENCOUNTER
Does she want to be referred to another surgeon or GI who is in network? Is she planning to change her medicare plan?

## 2023-12-15 NOTE — TELEPHONE ENCOUNTER
PER CALL FROM [facility] THE PATIENT CANCELLED HER APPT WITH THEM SINCE WE ARE OUT OF NETWORK WITH HER INSURANCE

## 2023-12-20 DIAGNOSIS — I10 ESSENTIAL HYPERTENSION: ICD-10-CM

## 2023-12-21 RX ORDER — AMLODIPINE BESYLATE 5 MG/1
5 TABLET ORAL DAILY
Qty: 90 TABLET | Refills: 3 | OUTPATIENT
Start: 2023-12-21

## 2023-12-21 RX ORDER — BENAZEPRIL HYDROCHLORIDE 20 MG/1
20 TABLET ORAL DAILY
Qty: 90 TABLET | Refills: 3 | OUTPATIENT
Start: 2023-12-21

## 2023-12-22 NOTE — TELEPHONE ENCOUNTER
Daughter is going to check with her and will let us know what she is going to do - she has switched plans to a ppo plan with out of network benefits

## 2024-01-09 DIAGNOSIS — I10 ESSENTIAL HYPERTENSION: ICD-10-CM

## 2024-01-09 RX ORDER — AMLODIPINE BESYLATE 5 MG/1
5 TABLET ORAL DAILY
Qty: 90 TABLET | Refills: 1 | Status: SHIPPED | OUTPATIENT
Start: 2024-01-09

## 2024-01-09 RX ORDER — BENAZEPRIL HYDROCHLORIDE 20 MG/1
20 TABLET ORAL DAILY
Qty: 90 TABLET | Refills: 1 | Status: SHIPPED | OUTPATIENT
Start: 2024-01-09

## 2024-01-11 DIAGNOSIS — E78.2 MIXED HYPERLIPIDEMIA: ICD-10-CM

## 2024-01-11 RX ORDER — ATORVASTATIN CALCIUM 10 MG/1
10 TABLET, FILM COATED ORAL NIGHTLY
Qty: 90 TABLET | Refills: 3 | OUTPATIENT
Start: 2024-01-11

## 2024-01-17 DIAGNOSIS — E11.40 TYPE 2 DIABETES MELLITUS WITH DIABETIC NEUROPATHY, WITH LONG-TERM CURRENT USE OF INSULIN: ICD-10-CM

## 2024-01-17 DIAGNOSIS — Z79.4 TYPE 2 DIABETES MELLITUS WITH DIABETIC NEUROPATHY, WITH LONG-TERM CURRENT USE OF INSULIN: ICD-10-CM

## 2024-01-17 RX ORDER — INSULIN GLARGINE 100 [IU]/ML
65 INJECTION, SOLUTION SUBCUTANEOUS DAILY
Qty: 59 ML | Refills: 1 | Status: SHIPPED | OUTPATIENT
Start: 2024-01-17 | End: 2024-07-17

## 2024-02-09 ENCOUNTER — OFFICE VISIT (OUTPATIENT)
Dept: DIABETES SERVICES | Facility: HOSPITAL | Age: 74
End: 2024-02-09
Payer: MEDICARE

## 2024-02-09 VITALS
SYSTOLIC BLOOD PRESSURE: 100 MMHG | HEART RATE: 71 BPM | HEIGHT: 64 IN | WEIGHT: 226.8 LBS | BODY MASS INDEX: 38.72 KG/M2 | OXYGEN SATURATION: 97 % | DIASTOLIC BLOOD PRESSURE: 52 MMHG

## 2024-02-09 DIAGNOSIS — Z79.4 TYPE 2 DIABETES MELLITUS WITH STAGE 3A CHRONIC KIDNEY DISEASE, WITH LONG-TERM CURRENT USE OF INSULIN: ICD-10-CM

## 2024-02-09 DIAGNOSIS — E11.22 TYPE 2 DIABETES MELLITUS WITH STAGE 3A CHRONIC KIDNEY DISEASE, WITH LONG-TERM CURRENT USE OF INSULIN: ICD-10-CM

## 2024-02-09 DIAGNOSIS — N18.31 TYPE 2 DIABETES MELLITUS WITH STAGE 3A CHRONIC KIDNEY DISEASE, WITH LONG-TERM CURRENT USE OF INSULIN: ICD-10-CM

## 2024-02-09 DIAGNOSIS — E11.40 TYPE 2 DIABETES MELLITUS WITH DIABETIC NEUROPATHY, WITH LONG-TERM CURRENT USE OF INSULIN: Primary | ICD-10-CM

## 2024-02-09 DIAGNOSIS — E66.9 OBESITY (BMI 30-39.9): ICD-10-CM

## 2024-02-09 DIAGNOSIS — Z79.4 TYPE 2 DIABETES MELLITUS WITH DIABETIC NEUROPATHY, WITH LONG-TERM CURRENT USE OF INSULIN: Primary | ICD-10-CM

## 2024-02-09 LAB
EXPIRATION DATE: ABNORMAL
GLUCOSE BLDC GLUCOMTR-MCNC: 143 MG/DL (ref 70–99)
HBA1C MFR BLD: 6.8 % (ref 4.5–5.7)
Lab: ABNORMAL

## 2024-02-09 PROCEDURE — 1160F RVW MEDS BY RX/DR IN RCRD: CPT | Performed by: NURSE PRACTITIONER

## 2024-02-09 PROCEDURE — 82948 REAGENT STRIP/BLOOD GLUCOSE: CPT | Performed by: NURSE PRACTITIONER

## 2024-02-09 PROCEDURE — 99214 OFFICE O/P EST MOD 30 MIN: CPT | Performed by: NURSE PRACTITIONER

## 2024-02-09 PROCEDURE — 3078F DIAST BP <80 MM HG: CPT | Performed by: NURSE PRACTITIONER

## 2024-02-09 PROCEDURE — 3074F SYST BP LT 130 MM HG: CPT | Performed by: NURSE PRACTITIONER

## 2024-02-09 PROCEDURE — G0463 HOSPITAL OUTPT CLINIC VISIT: HCPCS | Performed by: NURSE PRACTITIONER

## 2024-02-09 PROCEDURE — 1159F MED LIST DOCD IN RCRD: CPT | Performed by: NURSE PRACTITIONER

## 2024-02-09 RX ORDER — PEN NEEDLE, DIABETIC 30 GX3/16"
1 NEEDLE, DISPOSABLE MISCELLANEOUS DAILY
Qty: 100 EACH | Refills: 5 | Status: SHIPPED | OUTPATIENT
Start: 2024-02-09
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
## 2024-02-16 ENCOUNTER — OFFICE VISIT (OUTPATIENT)
Dept: FAMILY MEDICINE CLINIC | Facility: CLINIC | Age: 74
End: 2024-02-16
Payer: MEDICARE

## 2024-02-16 VITALS
OXYGEN SATURATION: 97 % | BODY MASS INDEX: 38.33 KG/M2 | HEART RATE: 80 BPM | WEIGHT: 224.5 LBS | TEMPERATURE: 96.7 F | SYSTOLIC BLOOD PRESSURE: 123 MMHG | DIASTOLIC BLOOD PRESSURE: 67 MMHG | HEIGHT: 64 IN

## 2024-02-16 DIAGNOSIS — I95.89 OTHER SPECIFIED HYPOTENSION: ICD-10-CM

## 2024-02-16 DIAGNOSIS — Z00.00 MEDICARE ANNUAL WELLNESS VISIT, SUBSEQUENT: Primary | ICD-10-CM

## 2024-02-16 DIAGNOSIS — F51.5 NIGHTMARES: ICD-10-CM

## 2024-02-16 DIAGNOSIS — I10 ESSENTIAL HYPERTENSION: ICD-10-CM

## 2024-02-16 DIAGNOSIS — E55.9 VITAMIN D DEFICIENCY: ICD-10-CM

## 2024-02-16 DIAGNOSIS — E78.2 MIXED HYPERLIPIDEMIA: ICD-10-CM

## 2024-02-16 PROBLEM — M46.1 INFLAMMATION OF SACROILIAC JOINT: Status: RESOLVED | Noted: 2021-12-07 | Resolved: 2024-02-16

## 2024-02-16 PROBLEM — E66.813 CLASS 3 SEVERE OBESITY WITH SERIOUS COMORBIDITY AND BODY MASS INDEX (BMI) OF 40.0 TO 44.9 IN ADULT: Status: RESOLVED | Noted: 2023-06-08 | Resolved: 2024-02-16

## 2024-02-16 PROBLEM — E66.01 CLASS 3 SEVERE OBESITY WITH SERIOUS COMORBIDITY AND BODY MASS INDEX (BMI) OF 40.0 TO 44.9 IN ADULT: Status: RESOLVED | Noted: 2023-06-08 | Resolved: 2024-02-16

## 2024-02-16 RX ORDER — MULTIVIT-MIN/IRON/FOLIC ACID/K 18-600-40
4000 CAPSULE ORAL DAILY
Qty: 180 CAPSULE | Refills: 1 | Status: SHIPPED | OUTPATIENT
Start: 2024-02-16

## 2024-02-16 RX ORDER — ATORVASTATIN CALCIUM 10 MG/1
10 TABLET, FILM COATED ORAL NIGHTLY
Qty: 90 TABLET | Refills: 1 | Status: SHIPPED | OUTPATIENT
Start: 2024-02-16

## 2024-02-16 RX ORDER — AMLODIPINE BESYLATE 2.5 MG/1
2.5 TABLET ORAL DAILY
Qty: 90 TABLET | Refills: 1 | Status: SHIPPED | OUTPATIENT
Start: 2024-02-16

## 2024-02-16 RX ORDER — PRAZOSIN HYDROCHLORIDE 2 MG/1
2 CAPSULE ORAL NIGHTLY
Qty: 90 CAPSULE | Refills: 1 | Status: SHIPPED | OUTPATIENT
Start: 2024-02-16

## 2024-02-16 RX ORDER — MONTELUKAST SODIUM 4 MG/1
1 TABLET, CHEWABLE ORAL 2 TIMES DAILY
Qty: 180 TABLET | Refills: 1 | Status: CANCELLED | OUTPATIENT
Start: 2024-02-16

## 2024-02-19 ENCOUNTER — TELEPHONE (OUTPATIENT)
Dept: FAMILY MEDICINE CLINIC | Facility: CLINIC | Age: 74
End: 2024-02-19
Payer: MEDICARE

## 2024-02-19 DIAGNOSIS — H33.321 ROUND HOLE, RIGHT EYE: ICD-10-CM

## 2024-02-19 DIAGNOSIS — H35.61 RETINAL HEMORRHAGE OF RIGHT EYE: Primary | ICD-10-CM

## 2024-02-19 DIAGNOSIS — H43.811 VITREOUS DEGENERATION OF RIGHT EYE: ICD-10-CM

## 2024-02-19 NOTE — TELEPHONE ENCOUNTER
I received the letter from her ophthalmologist regarding her right eye retinal hemorrhage.  They recommend for her PCP to do some blood test to include prothrombin time (PT), activated partial thromboplastin time (PTT), CBC with differential, peripheral blood smear to check for thrombocytopenia or leukemia, liver function test, and protein C&S.  I called and spoke to her daughter Val Michaud, patient was at work.  Advised her of the lab workup that is needed.  She states that they will go to the hospital lab on Wednesday and have these drawn.

## 2024-02-21 ENCOUNTER — LAB (OUTPATIENT)
Dept: LAB | Facility: HOSPITAL | Age: 74
End: 2024-02-21
Payer: MEDICARE

## 2024-02-21 DIAGNOSIS — H33.321 ROUND HOLE, RIGHT EYE: ICD-10-CM

## 2024-02-21 DIAGNOSIS — H35.61 RETINAL HEMORRHAGE OF RIGHT EYE: ICD-10-CM

## 2024-02-21 DIAGNOSIS — H43.811 VITREOUS DEGENERATION OF RIGHT EYE: ICD-10-CM

## 2024-02-21 LAB
ALBUMIN SERPL-MCNC: 4 G/DL (ref 3.5–5.2)
ALP SERPL-CCNC: 100 U/L (ref 39–117)
ALT SERPL W P-5'-P-CCNC: 33 U/L (ref 1–33)
APTT PPP: 28.9 SECONDS (ref 24.2–34.2)
AST SERPL-CCNC: 42 U/L (ref 1–32)
BILIRUB CONJ SERPL-MCNC: <0.2 MG/DL (ref 0–0.3)
BILIRUB INDIRECT SERPL-MCNC: ABNORMAL MG/DL
BILIRUB SERPL-MCNC: 0.4 MG/DL (ref 0–1.2)
DEPRECATED RDW RBC AUTO: 41.5 FL (ref 37–54)
ERYTHROCYTE [DISTWIDTH] IN BLOOD BY AUTOMATED COUNT: 12.4 % (ref 12.3–15.4)
HCT VFR BLD AUTO: 44.9 % (ref 34–46.6)
HGB BLD-MCNC: 14.6 G/DL (ref 12–15.9)
INR PPP: 1.16 (ref 0.86–1.15)
LYMPHOCYTES # BLD MANUAL: 0.97 10*3/MM3 (ref 0.7–3.1)
LYMPHOCYTES NFR BLD MANUAL: 7.1 % (ref 5–12)
MCH RBC QN AUTO: 29.9 PG (ref 26.6–33)
MCHC RBC AUTO-ENTMCNC: 32.5 G/DL (ref 31.5–35.7)
MCV RBC AUTO: 92 FL (ref 79–97)
MONOCYTES # BLD: 0.34 10*3/MM3 (ref 0.1–0.9)
NEUTROPHILS # BLD AUTO: 3.48 10*3/MM3 (ref 1.7–7)
NEUTROPHILS NFR BLD MANUAL: 72.7 % (ref 42.7–76)
PATHOLOGY REVIEW: YES
PLAT MORPH BLD: NORMAL
PLATELET # BLD AUTO: 145 10*3/MM3 (ref 140–450)
PMV BLD AUTO: 12.1 FL (ref 6–12)
PROT SERPL-MCNC: 7 G/DL (ref 6–8.5)
PROTHROMBIN TIME: 15.1 SECONDS (ref 11.8–14.9)
RBC # BLD AUTO: 4.88 10*6/MM3 (ref 3.77–5.28)
RBC MORPH BLD: NORMAL
VARIANT LYMPHS NFR BLD MANUAL: 16.2 % (ref 19.6–45.3)
VARIANT LYMPHS NFR BLD MANUAL: 4 % (ref 0–5)
WBC MORPH BLD: NORMAL
WBC NRBC COR # BLD AUTO: 4.78 10*3/MM3 (ref 3.4–10.8)

## 2024-02-21 PROCEDURE — 36415 COLL VENOUS BLD VENIPUNCTURE: CPT

## 2024-02-21 PROCEDURE — 85610 PROTHROMBIN TIME: CPT

## 2024-02-21 PROCEDURE — 85303 CLOT INHIBIT PROT C ACTIVITY: CPT

## 2024-02-21 PROCEDURE — 85306 CLOT INHIBIT PROT S FREE: CPT

## 2024-02-21 PROCEDURE — 80076 HEPATIC FUNCTION PANEL: CPT

## 2024-02-21 PROCEDURE — 85007 BL SMEAR W/DIFF WBC COUNT: CPT

## 2024-02-21 PROCEDURE — 85730 THROMBOPLASTIN TIME PARTIAL: CPT

## 2024-02-21 PROCEDURE — 85027 COMPLETE CBC AUTOMATED: CPT

## 2024-02-22 LAB
LAB AP CASE REPORT: NORMAL
PATH REPORT.FINAL DX SPEC: NORMAL

## 2024-02-23 DIAGNOSIS — H43.811 VITREOUS DEGENERATION OF RIGHT EYE: ICD-10-CM

## 2024-02-23 DIAGNOSIS — H33.321 ROUND HOLE, RIGHT EYE: ICD-10-CM

## 2024-02-23 DIAGNOSIS — H35.61 RETINAL HEMORRHAGE OF RIGHT EYE: Primary | ICD-10-CM

## 2024-02-23 DIAGNOSIS — D69.9 BLEEDING DISORDER: ICD-10-CM

## 2024-02-23 LAB
PROT C ACT/NOR PPP: 86 % (ref 73–180)
PROT S ACT/NOR PPP: 84 % (ref 63–140)

## 2024-02-27 ENCOUNTER — TELEPHONE (OUTPATIENT)
Dept: FAMILY MEDICINE CLINIC | Facility: CLINIC | Age: 74
End: 2024-02-27
Payer: MEDICARE

## 2024-02-27 DIAGNOSIS — H35.61 RETINAL HEMORRHAGE OF RIGHT EYE: Primary | ICD-10-CM

## 2024-02-27 NOTE — TELEPHONE ENCOUNTER
Hematology wants her to have another abnormal PT/INR before they will see her.  I will have her get another PT/INR and we will review the results before referring to hematology.

## 2024-03-23 DIAGNOSIS — K21.9 GASTROESOPHAGEAL REFLUX DISEASE, UNSPECIFIED WHETHER ESOPHAGITIS PRESENT: ICD-10-CM

## 2024-03-25 RX ORDER — FAMOTIDINE 40 MG/1
40 TABLET, FILM COATED ORAL DAILY
Qty: 90 TABLET | Refills: 1 | Status: SHIPPED | OUTPATIENT
Start: 2024-03-25

## 2024-05-09 ENCOUNTER — OFFICE VISIT (OUTPATIENT)
Dept: DIABETES SERVICES | Facility: HOSPITAL | Age: 74
End: 2024-05-09
Payer: MEDICARE

## 2024-05-09 VITALS
HEART RATE: 67 BPM | SYSTOLIC BLOOD PRESSURE: 124 MMHG | OXYGEN SATURATION: 96 % | BODY MASS INDEX: 37.73 KG/M2 | TEMPERATURE: 97.3 F | DIASTOLIC BLOOD PRESSURE: 57 MMHG | WEIGHT: 221 LBS | HEIGHT: 64 IN

## 2024-05-09 DIAGNOSIS — Z79.4 TYPE 2 DIABETES MELLITUS WITH DIABETIC NEUROPATHY, WITH LONG-TERM CURRENT USE OF INSULIN: ICD-10-CM

## 2024-05-09 DIAGNOSIS — Z79.4 CONTROLLED TYPE 2 DIABETES MELLITUS WITH HYPERGLYCEMIA, WITH LONG-TERM CURRENT USE OF INSULIN: Primary | ICD-10-CM

## 2024-05-09 DIAGNOSIS — E11.65 UNCONTROLLED TYPE 2 DIABETES MELLITUS WITH HYPERGLYCEMIA: ICD-10-CM

## 2024-05-09 DIAGNOSIS — E66.9 OBESITY (BMI 30-39.9): ICD-10-CM

## 2024-05-09 DIAGNOSIS — E11.22 TYPE 2 DIABETES MELLITUS WITH STAGE 3A CHRONIC KIDNEY DISEASE, WITH LONG-TERM CURRENT USE OF INSULIN: ICD-10-CM

## 2024-05-09 DIAGNOSIS — E11.9 TYPE 2 DIABETES MELLITUS WITH HEMOGLOBIN A1C GOAL OF LESS THAN 7.0%: ICD-10-CM

## 2024-05-09 DIAGNOSIS — E11.65 CONTROLLED TYPE 2 DIABETES MELLITUS WITH HYPERGLYCEMIA, WITH LONG-TERM CURRENT USE OF INSULIN: Primary | ICD-10-CM

## 2024-05-09 DIAGNOSIS — N18.31 TYPE 2 DIABETES MELLITUS WITH STAGE 3A CHRONIC KIDNEY DISEASE, WITH LONG-TERM CURRENT USE OF INSULIN: ICD-10-CM

## 2024-05-09 DIAGNOSIS — Z79.4 TYPE 2 DIABETES MELLITUS WITH STAGE 3A CHRONIC KIDNEY DISEASE, WITH LONG-TERM CURRENT USE OF INSULIN: ICD-10-CM

## 2024-05-09 DIAGNOSIS — E11.40 TYPE 2 DIABETES MELLITUS WITH DIABETIC NEUROPATHY, WITH LONG-TERM CURRENT USE OF INSULIN: ICD-10-CM

## 2024-05-09 LAB
EXPIRATION DATE: ABNORMAL
GLUCOSE BLDC GLUCOMTR-MCNC: 125 MG/DL (ref 70–99)
HBA1C MFR BLD: 6.9 % (ref 4.5–5.7)
Lab: ABNORMAL

## 2024-05-09 PROCEDURE — 82948 REAGENT STRIP/BLOOD GLUCOSE: CPT | Performed by: NURSE PRACTITIONER

## 2024-05-09 PROCEDURE — 1159F MED LIST DOCD IN RCRD: CPT | Performed by: NURSE PRACTITIONER

## 2024-05-09 PROCEDURE — 3074F SYST BP LT 130 MM HG: CPT | Performed by: NURSE PRACTITIONER

## 2024-05-09 PROCEDURE — G0463 HOSPITAL OUTPT CLINIC VISIT: HCPCS | Performed by: NURSE PRACTITIONER

## 2024-05-09 PROCEDURE — 1160F RVW MEDS BY RX/DR IN RCRD: CPT | Performed by: NURSE PRACTITIONER

## 2024-05-09 PROCEDURE — 3078F DIAST BP <80 MM HG: CPT | Performed by: NURSE PRACTITIONER

## 2024-05-09 PROCEDURE — 99214 OFFICE O/P EST MOD 30 MIN: CPT | Performed by: NURSE PRACTITIONER

## 2024-05-09 PROCEDURE — 83036 HEMOGLOBIN GLYCOSYLATED A1C: CPT | Performed by: NURSE PRACTITIONER

## 2024-05-09 RX ORDER — INSULIN GLARGINE 100 [IU]/ML
65 INJECTION, SOLUTION SUBCUTANEOUS DAILY
Qty: 59 ML | Refills: 1 | Status: SHIPPED | OUTPATIENT
Start: 2024-05-09 | End: 2024-11-07

## 2024-05-24 ENCOUNTER — OFFICE VISIT (OUTPATIENT)
Dept: FAMILY MEDICINE CLINIC | Facility: CLINIC | Age: 74
End: 2024-05-24
Payer: MEDICARE

## 2024-05-24 VITALS
DIASTOLIC BLOOD PRESSURE: 55 MMHG | WEIGHT: 223.2 LBS | TEMPERATURE: 96.8 F | HEART RATE: 66 BPM | SYSTOLIC BLOOD PRESSURE: 132 MMHG | OXYGEN SATURATION: 99 % | BODY MASS INDEX: 38.1 KG/M2 | HEIGHT: 64 IN

## 2024-05-24 DIAGNOSIS — E11.65 TYPE 2 DIABETES MELLITUS WITH HYPERGLYCEMIA, WITH LONG-TERM CURRENT USE OF INSULIN: ICD-10-CM

## 2024-05-24 DIAGNOSIS — E55.9 VITAMIN D DEFICIENCY: ICD-10-CM

## 2024-05-24 DIAGNOSIS — I10 ESSENTIAL HYPERTENSION: Primary | ICD-10-CM

## 2024-05-24 DIAGNOSIS — Z79.4 TYPE 2 DIABETES MELLITUS WITH HYPERGLYCEMIA, WITH LONG-TERM CURRENT USE OF INSULIN: ICD-10-CM

## 2024-05-24 DIAGNOSIS — F51.5 NIGHTMARES: ICD-10-CM

## 2024-05-24 DIAGNOSIS — E78.2 MIXED HYPERLIPIDEMIA: ICD-10-CM

## 2024-05-24 DIAGNOSIS — E53.8 VITAMIN B12 DEFICIENCY: ICD-10-CM

## 2024-05-24 DIAGNOSIS — N18.31 STAGE 3A CHRONIC KIDNEY DISEASE: ICD-10-CM

## 2024-05-24 LAB
25(OH)D3 SERPL-MCNC: 37.8 NG/ML (ref 30–100)
ALBUMIN SERPL-MCNC: 3.5 G/DL (ref 3.5–5.2)
ALBUMIN/GLOB SERPL: 1 G/DL
ALP SERPL-CCNC: 107 U/L (ref 39–117)
ALT SERPL W P-5'-P-CCNC: 33 U/L (ref 1–33)
ANION GAP SERPL CALCULATED.3IONS-SCNC: 10.5 MMOL/L (ref 5–15)
AST SERPL-CCNC: 35 U/L (ref 1–32)
BASOPHILS # BLD AUTO: 0.03 10*3/MM3 (ref 0–0.2)
BASOPHILS NFR BLD AUTO: 0.7 % (ref 0–1.5)
BILIRUB SERPL-MCNC: 0.4 MG/DL (ref 0–1.2)
BUN SERPL-MCNC: 18 MG/DL (ref 8–23)
BUN/CREAT SERPL: 23.4 (ref 7–25)
CALCIUM SPEC-SCNC: 8.7 MG/DL (ref 8.6–10.5)
CHLORIDE SERPL-SCNC: 108 MMOL/L (ref 98–107)
CHOLEST SERPL-MCNC: 99 MG/DL (ref 0–200)
CO2 SERPL-SCNC: 22.5 MMOL/L (ref 22–29)
CREAT SERPL-MCNC: 0.77 MG/DL (ref 0.57–1)
DEPRECATED RDW RBC AUTO: 42 FL (ref 37–54)
EGFRCR SERPLBLD CKD-EPI 2021: 80.1 ML/MIN/1.73
EOSINOPHIL # BLD AUTO: 0.07 10*3/MM3 (ref 0–0.4)
EOSINOPHIL NFR BLD AUTO: 1.7 % (ref 0.3–6.2)
ERYTHROCYTE [DISTWIDTH] IN BLOOD BY AUTOMATED COUNT: 12.3 % (ref 12.3–15.4)
GLOBULIN UR ELPH-MCNC: 3.4 GM/DL
GLUCOSE SERPL-MCNC: 92 MG/DL (ref 65–99)
HCT VFR BLD AUTO: 45.2 % (ref 34–46.6)
HDLC SERPL-MCNC: 37 MG/DL (ref 40–60)
HGB BLD-MCNC: 15 G/DL (ref 12–15.9)
IMM GRANULOCYTES # BLD AUTO: 0.01 10*3/MM3 (ref 0–0.05)
IMM GRANULOCYTES NFR BLD AUTO: 0.2 % (ref 0–0.5)
LDLC SERPL CALC-MCNC: 46 MG/DL (ref 0–100)
LDLC/HDLC SERPL: 1.25 {RATIO}
LYMPHOCYTES # BLD AUTO: 1.65 10*3/MM3 (ref 0.7–3.1)
LYMPHOCYTES NFR BLD AUTO: 39.4 % (ref 19.6–45.3)
MCH RBC QN AUTO: 30.9 PG (ref 26.6–33)
MCHC RBC AUTO-ENTMCNC: 33.2 G/DL (ref 31.5–35.7)
MCV RBC AUTO: 93.2 FL (ref 79–97)
MONOCYTES # BLD AUTO: 0.27 10*3/MM3 (ref 0.1–0.9)
MONOCYTES NFR BLD AUTO: 6.4 % (ref 5–12)
NEUTROPHILS NFR BLD AUTO: 2.16 10*3/MM3 (ref 1.7–7)
NEUTROPHILS NFR BLD AUTO: 51.6 % (ref 42.7–76)
NRBC BLD AUTO-RTO: 0 /100 WBC (ref 0–0.2)
PLATELET # BLD AUTO: 139 10*3/MM3 (ref 140–450)
PMV BLD AUTO: 12 FL (ref 6–12)
POTASSIUM SERPL-SCNC: 4.4 MMOL/L (ref 3.5–5.2)
PROT SERPL-MCNC: 6.9 G/DL (ref 6–8.5)
RBC # BLD AUTO: 4.85 10*6/MM3 (ref 3.77–5.28)
SODIUM SERPL-SCNC: 141 MMOL/L (ref 136–145)
TRIGL SERPL-MCNC: 79 MG/DL (ref 0–150)
TSH SERPL DL<=0.05 MIU/L-ACNC: 2.43 UIU/ML (ref 0.27–4.2)
VIT B12 BLD-MCNC: 985 PG/ML (ref 211–946)
VLDLC SERPL-MCNC: 16 MG/DL (ref 5–40)
WBC NRBC COR # BLD AUTO: 4.19 10*3/MM3 (ref 3.4–10.8)

## 2024-05-24 PROCEDURE — 3075F SYST BP GE 130 - 139MM HG: CPT | Performed by: NURSE PRACTITIONER

## 2024-05-24 PROCEDURE — 85025 COMPLETE CBC W/AUTO DIFF WBC: CPT | Performed by: NURSE PRACTITIONER

## 2024-05-24 PROCEDURE — 1159F MED LIST DOCD IN RCRD: CPT | Performed by: NURSE PRACTITIONER

## 2024-05-24 PROCEDURE — 1126F AMNT PAIN NOTED NONE PRSNT: CPT | Performed by: NURSE PRACTITIONER

## 2024-05-24 PROCEDURE — 82607 VITAMIN B-12: CPT | Performed by: NURSE PRACTITIONER

## 2024-05-24 PROCEDURE — 3078F DIAST BP <80 MM HG: CPT | Performed by: NURSE PRACTITIONER

## 2024-05-24 PROCEDURE — 80061 LIPID PANEL: CPT | Performed by: NURSE PRACTITIONER

## 2024-05-24 PROCEDURE — 1160F RVW MEDS BY RX/DR IN RCRD: CPT | Performed by: NURSE PRACTITIONER

## 2024-05-24 PROCEDURE — 84443 ASSAY THYROID STIM HORMONE: CPT | Performed by: NURSE PRACTITIONER

## 2024-05-24 PROCEDURE — 36415 COLL VENOUS BLD VENIPUNCTURE: CPT | Performed by: NURSE PRACTITIONER

## 2024-05-24 PROCEDURE — 99214 OFFICE O/P EST MOD 30 MIN: CPT | Performed by: NURSE PRACTITIONER

## 2024-05-24 PROCEDURE — 80053 COMPREHEN METABOLIC PANEL: CPT | Performed by: NURSE PRACTITIONER

## 2024-05-24 PROCEDURE — 82306 VITAMIN D 25 HYDROXY: CPT | Performed by: NURSE PRACTITIONER

## 2024-05-24 RX ORDER — MELOXICAM 15 MG/1
15 TABLET ORAL DAILY
COMMUNITY
End: 2024-05-24

## 2024-05-24 NOTE — PROGRESS NOTES
Answers submitted by the patient for this visit:  Primary Reason for Visit (Submitted on 5/17/2024)  What is the primary reason for your visit?: Diabetes  Diabetes Questionnaire (Submitted on 5/17/2024)  Chief Complaint: Diabetes problem  Diabetes type: type 2  MedicAlert ID: No  Disease duration: 2005 Years  Treatment compliance: most of the time  Symptom course: improving  Home blood tests: 1-2 x per day  High score: 140-180  Below 70: never  blurred vision: No  foot paresthesias: Yes  foot ulcerations: No  polydipsia: No  polyuria: No  weight loss: No  blackouts: No  hospitalization: No  nocturnal hypoglycemia: No  required assistance: No  required glucagon: No  confusion: No  headaches: No  speech difficulty: No  sweats: No  tremors: Yes  Dose schedule: pre-breakfast  Given by: patient  Injection sites: abdominal wall  Current diet: generally healthy  Meal planning: avoidance of concentrated sweets  Exercise: intermittently  Eye exam current: Yes  Sees podiatrist: No  Chief Complaint  Follow-up (3 month follow up ), Hyperlipidemia, Hypertension, and Discuss medication  (Discuss Meloxicam )    Subjective            Jane Huang is a 74 y.o. female who presents to [facility]   History of Present Illness  3-month follow-up.  She is accompanied by her daughter Val Michaud.    She was having worsening of nightmares and we increased the prazosin dose to 2 mg at bedtime.  She and her daughter both states that she is doing much better now and not having any more nightmares.    Diabetes type 2, insulin-dependent: She is following with the diabetic clinic.  Her A1c was checked on 5/9/2024 and was 6.9%. She is on Jardiance 10 mg daily. Glipizide was stopped due to some severe hypoglycemic episodes.  She is still on Lantus insulin 65 units daily also.    She has seen the podiatrist.  She states that the diabetic nurse practitioner referred her to the podiatrist.  She states the podiatrist  "went to start her on Mobic for her foot pain but she has not started yet because she wanted to ask me if that was okay.  Her last EGFR was less than 60.  She does have chronic kidney disease.  She also is on benazepril which is interaction to Mobic.    She has been following with pain management and she is getting an ablation in her spine.     Her LDL was at goal, on atorvastatin 10 mg daily.       Vitamin B12 level was slightly elevated so advised her to decrease vitamin B12 supplement to 3x/weekly.     Vitamin D level was low normal and I recommended that she increase vitamin D to 4000 units daily.     Hypertension:  BP better today after decreasing amlodipine 2.5 mg, also on benazepril 20 mg daily     GERD: She is stable on famotidine 40 mg daily.      Tobacco Use: Low Risk  (5/24/2024)    Patient History     Smoking Tobacco Use: Never     Smokeless Tobacco Use: Never     Passive Exposure: Not on file      E-cigarette/Vaping    E-cigarette/Vaping Use Never User      E-cigarette/Vaping Substances     E-cigarette/Vaping Devices       Alcohol Use: Not At Risk (9/7/2021)    AUDIT-C     Frequency of Alcohol Consumption: Never     Average Number of Drinks: Not on file     Frequency of Binge Drinking: Not on file         Objective   Vital Signs:   Vitals:    05/24/24 1410   BP: 132/55   BP Location: Left arm   Patient Position: Sitting   Pulse: 66   Temp: 96.8 °F (36 °C)   TempSrc: Temporal   SpO2: 99%   Weight: 101 kg (223 lb 3.2 oz)   Height: 162.6 cm (64\")     Body mass index is 38.31 kg/m².    Wt Readings from Last 3 Encounters:   05/24/24 101 kg (223 lb 3.2 oz)   05/09/24 100 kg (221 lb)   02/16/24 102 kg (224 lb 8 oz)     BP Readings from Last 3 Encounters:   05/24/24 132/55   05/09/24 124/57   02/16/24 123/67       Health Maintenance   Topic Date Due    ZOSTER VACCINE (1 of 2) 05/24/2024 (Originally 1/16/2000)    COVID-19 Vaccine (6 - 2023-24 season) 08/13/2024 (Originally 9/1/2023)    BMI FOLLOWUP  07/18/2024    " "INFLUENZA VACCINE  08/01/2024    HEMOGLOBIN A1C  11/09/2024    LIPID PANEL  11/10/2024    URINE MICROALBUMIN  11/10/2024    DXA SCAN  01/25/2025    ANNUAL WELLNESS VISIT  02/16/2025    DIABETIC EYE EXAM  02/20/2025    TDAP/TD VACCINES (2 - Td or Tdap) 10/26/2032    HEPATITIS C SCREENING  Completed    RSV Vaccine - Adults  Completed    Pneumococcal Vaccine 65+  Completed    COLORECTAL CANCER SCREENING  Discontinued       /55 (BP Location: Left arm, Patient Position: Sitting)   Pulse 66   Temp 96.8 °F (36 °C) (Temporal)   Ht 162.6 cm (64\")   Wt 101 kg (223 lb 3.2 oz)   SpO2 99%   BMI 38.31 kg/m²       Current Outpatient Medications:     amLODIPine (NORVASC) 2.5 MG tablet, Take 1 tablet by mouth Daily., Disp: 90 tablet, Rfl: 1    atorvastatin (LIPITOR) 10 MG tablet, Take 1 tablet by mouth Every Night., Disp: 90 tablet, Rfl: 1    baclofen (LIORESAL) 10 MG tablet, Take 1 tablet by mouth 3 (Three) Times a Day., Disp: , Rfl:     benazepril (LOTENSIN) 20 MG tablet, Take 1 tablet by mouth Daily., Disp: 90 tablet, Rfl: 1    Blood Glucose Monitoring Suppl device, Use as directed for blood glucose monitoring, Disp: 1 each, Rfl: 0    Calcium Carbonate 1500 (600 Ca) MG tablet, Take 1 tablet by mouth 2 (two) times a day., Disp: , Rfl:     cetirizine (zyrTEC) 10 MG tablet, Take 1 tablet by mouth Daily., Disp: , Rfl:     empagliflozin (Jardiance) 10 MG tablet tablet, Take 1 tablet by mouth Daily for 90 days., Disp: 90 tablet, Rfl: 1    famotidine (PEPCID) 40 MG tablet, TAKE 1 TABLET EVERY DAY, Disp: 90 tablet, Rfl: 1    glucose blood test strip, Test blood glucose 3 times each day, Disp: 300 each, Rfl: 5    HYDROcodone-acetaminophen (NORCO)  MG per tablet, Take 1 tablet by mouth Every 12 (Twelve) Hours As Needed., Disp: , Rfl:     Insulin Glargine (Lantus SoloStar) 100 UNIT/ML injection pen, Inject 65 Units under the skin into the appropriate area as directed Daily for 182 days., Disp: 59 mL, Rfl: 1    Insulin Pen " Needle (Pen Needles) 32G X 4 MM misc, Use 1 each Daily., Disp: 100 each, Rfl: 5    Lancets misc, Test blood glucose 3 times each day, Disp: 300 each, Rfl: 5    ondansetron ODT (ZOFRAN-ODT) 4 MG disintegrating tablet, , Disp: , Rfl:     prazosin (MINIPRESS) 2 MG capsule, Take 1 capsule by mouth Every Night., Disp: 90 capsule, Rfl: 1    vitamin B-12 (CYANOCOBALAMIN) 1000 MCG tablet, TAKE 1 TABLET BY MOUTH EVERY DAY, Disp: 90 tablet, Rfl: 1    Vitamin D, Cholecalciferol, 50 MCG (2000 UT) capsule, Take 2 capsules by mouth Daily., Disp: 180 capsule, Rfl: 1   Past Medical History:   Diagnosis Date    Acid reflux     Allergies     Arthritis     Broken bones     Cataracts, bilateral     Chronic allergic rhinitis     Claustrophobia     Colitis     CTS (carpal tunnel syndrome)     Right    Depression 09/09/2020    Diabetes     Diabetes mellitus, type 2 06/11/2019    Diverticulitis     Encounter for screening breast examination     Essential hypertension 06/11/2019    Essential tremor 06/11/2019    Forgetfulness     Gall stones     GERD (gastroesophageal reflux disease) 06/02/2021    High blood pressure     HTN (hypertension), benign     Insomnia, unspecified 09/09/2020    Kidney stones     Low back pain     Lumbosacral disc disease     Mixed hyperlipidemia 09/15/2022    Screening for colon cancer 03/18/2013    Sinus trouble     Toe deformity 03/02/2021    Vitamin B12 deficiency 09/09/2020    Vitamin D deficiency 12/09/2020        Physical Exam  Vitals reviewed.   Constitutional:       Appearance: Normal appearance. She is well-developed. She is obese.   Neck:      Thyroid: No thyroid mass, thyromegaly or thyroid tenderness.   Cardiovascular:      Rate and Rhythm: Normal rate and regular rhythm.      Heart sounds: No murmur heard.     No friction rub. No gallop.   Pulmonary:      Effort: Pulmonary effort is normal.      Breath sounds: Normal breath sounds. No wheezing or rhonchi.   Lymphadenopathy:      Cervical: No cervical  adenopathy.   Skin:     General: Skin is warm and dry.   Neurological:      Mental Status: She is alert and oriented to person, place, and time.      Cranial Nerves: No cranial nerve deficit.   Psychiatric:         Mood and Affect: Mood and affect normal.         Behavior: Behavior normal.         Thought Content: Thought content normal. Thought content does not include homicidal or suicidal ideation.         Judgment: Judgment normal.          Result Review :    The following data was reviewed by: NHUNG Ramos on 05/24/2024:  Common Labs   Common labs          2/9/2024    14:12 2/21/2024    12:11 5/9/2024    14:18   Common Labs   Albumin  4.0     Total Bilirubin  0.4     Alkaline Phosphatase  100     AST (SGOT)  42     ALT (SGPT)  33     WBC  4.78     Hemoglobin  14.6     Hematocrit  44.9     Platelets  145     Hemoglobin A1C 6.8   6.9      VITD   Lab Results   Component Value Date    WWQB57VB 34.9 11/10/2023     VITB12   Lab Results   Component Value Date    IHGGCPPL50 1,005 (H) 06/08/2023     Assessment & Plan  Essential hypertension  Hypertension is stable and controlled  Continue current treatment regimen.  Blood pressure will be reassessed in 3 months.  Type 2 diabetes mellitus with hyperglycemia, with long-term current use of insulin  Diabetes is stable.   Continue current treatment regimen.  Diabetes will be reassessed in 3 months  Nightmares  Psychological condition is improving with treatment.  Continue current treatment regimen.  Psychological condition  will be reassessed in 3 months.  Mixed hyperlipidemia   Lipid abnormalities are stable    Plan:  Continue same medication/s without change.      Discussed medication dosage, use, side effects, and goals of treatment in detail.    Counseled patient on lifestyle modifications to help control hyperlipidemia.     Patient Treatment Goals:   LDL goal is less than 70    Followup in 6 months.  Vitamin D deficiency  Vitamin D is stable, will continue  vitamin D 4000 units daily.  Vitamin B12 deficiency  I will check her vitamin B12 level today.  She is on vitamin B12 tablets daily.  Stage 3a chronic kidney disease  Renal condition is stable.  I did advise her not to take meloxicam.  She states that her foot pain is controlled with hydrocodone.  Renal condition will be reassessed in 3 months.    Orders Placed This Encounter   Procedures    Vitamin D,25-Hydroxy    TSH Rfx On Abnormal To Free T4    CBC Auto Differential    Comprehensive Metabolic Panel    Lipid Panel    Vitamin B12          Diagnosis Plan   1. Essential hypertension  TSH Rfx On Abnormal To Free T4    CBC Auto Differential    Comprehensive Metabolic Panel    Lipid Panel      2. Type 2 diabetes mellitus with hyperglycemia, with long-term current use of insulin  TSH Rfx On Abnormal To Free T4    CBC Auto Differential    Comprehensive Metabolic Panel    Lipid Panel      3. Nightmares        4. Mixed hyperlipidemia  Comprehensive Metabolic Panel    Lipid Panel      5. Vitamin D deficiency  Vitamin D,25-Hydroxy      6. Vitamin B12 deficiency  Vitamin B12      7. Stage 3a chronic kidney disease              FOLLOW UP  Return in about 3 months (around 8/24/2024) for 30 min apt for complex pt.  Patient was given instructions and counseling regarding her condition or for health maintenance advice. Please see specific information pulled into the AVS if appropriate.       CURRENT & DISCONTINUED MEDICATIONS  Current Outpatient Medications   Medication Instructions    amLODIPine (NORVASC) 2.5 mg, Oral, Daily    atorvastatin (LIPITOR) 10 mg, Oral, Nightly    baclofen (LIORESAL) 10 mg, Oral, 3 Times Daily    benazepril (LOTENSIN) 20 mg, Oral, Daily    Blood Glucose Monitoring Suppl device Use as directed for blood glucose monitoring    Calcium Carbonate 1500 (600 Ca) MG tablet 1 tablet, Oral, 2 times daily    cetirizine (zyrTEC) 10 MG tablet 1 tablet, Oral, Daily    empagliflozin (JARDIANCE) 10 mg, Oral, Daily     famotidine (PEPCID) 40 mg, Oral, Daily    glucose blood test strip Test blood glucose 3 times each day    HYDROcodone-acetaminophen (NORCO)  MG per tablet 1 tablet, Oral, Every 12 Hours PRN    Insulin Pen Needle (Pen Needles) 32G X 4 MM misc 1 each, Does not apply, Daily    Lancets misc Test blood glucose 3 times each day    Lantus SoloStar 65 Units, Subcutaneous, Daily    ondansetron ODT (ZOFRAN-ODT) 4 MG disintegrating tablet No dose, route, or frequency recorded.    prazosin (MINIPRESS) 2 mg, Oral, Nightly    vitamin B-12 (CYANOCOBALAMIN) 1000 MCG tablet TAKE 1 TABLET BY MOUTH EVERY DAY    Vitamin D (Cholecalciferol) 100 mcg, Oral, Daily       Medications Discontinued During This Encounter   Medication Reason    meloxicam (MOBIC) 15 MG tablet Other- See Medication Note        Parts of this note are electronic transcriptions/translations of spoken language to printed text using the Dragon Dictation system.    Chrissy Collins, NHUNG  05/24/24  16:58 EDT

## 2024-05-24 NOTE — ASSESSMENT & PLAN NOTE
Renal condition is stable.  I did advise her not to take meloxicam.  She states that her foot pain is controlled with hydrocodone.  Renal condition will be reassessed in 3 months.

## 2024-06-06 DIAGNOSIS — I10 ESSENTIAL HYPERTENSION: ICD-10-CM

## 2024-06-06 RX ORDER — BENAZEPRIL HYDROCHLORIDE 20 MG/1
20 TABLET ORAL DAILY
Qty: 90 TABLET | Refills: 0 | Status: SHIPPED | OUTPATIENT
Start: 2024-06-06

## 2024-07-13 DIAGNOSIS — I10 ESSENTIAL HYPERTENSION: ICD-10-CM

## 2024-07-13 DIAGNOSIS — E78.2 MIXED HYPERLIPIDEMIA: ICD-10-CM

## 2024-07-13 DIAGNOSIS — F51.5 NIGHTMARES: ICD-10-CM

## 2024-07-15 RX ORDER — PRAZOSIN HYDROCHLORIDE 2 MG/1
2 CAPSULE ORAL NIGHTLY
Qty: 90 CAPSULE | Refills: 3 | OUTPATIENT
Start: 2024-07-15

## 2024-07-15 RX ORDER — ATORVASTATIN CALCIUM 10 MG/1
10 TABLET, FILM COATED ORAL NIGHTLY
Qty: 90 TABLET | Refills: 3 | OUTPATIENT
Start: 2024-07-15

## 2024-07-15 RX ORDER — AMLODIPINE BESYLATE 2.5 MG/1
2.5 TABLET ORAL DAILY
Qty: 90 TABLET | Refills: 3 | OUTPATIENT
Start: 2024-07-15
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
## 2024-08-06 ENCOUNTER — OFFICE VISIT (OUTPATIENT)
Dept: DIABETES SERVICES | Facility: HOSPITAL | Age: 74
End: 2024-08-06
Payer: MEDICARE

## 2024-08-06 VITALS
BODY MASS INDEX: 38.58 KG/M2 | DIASTOLIC BLOOD PRESSURE: 55 MMHG | HEART RATE: 71 BPM | SYSTOLIC BLOOD PRESSURE: 118 MMHG | OXYGEN SATURATION: 94 % | WEIGHT: 226 LBS | HEIGHT: 64 IN

## 2024-08-06 DIAGNOSIS — E11.65 CONTROLLED TYPE 2 DIABETES MELLITUS WITH HYPERGLYCEMIA, WITH LONG-TERM CURRENT USE OF INSULIN: ICD-10-CM

## 2024-08-06 DIAGNOSIS — E66.9 OBESITY (BMI 30-39.9): ICD-10-CM

## 2024-08-06 DIAGNOSIS — Z79.4 TYPE 2 DIABETES MELLITUS WITH DIABETIC NEUROPATHY, WITH LONG-TERM CURRENT USE OF INSULIN: Primary | ICD-10-CM

## 2024-08-06 DIAGNOSIS — E11.40 TYPE 2 DIABETES MELLITUS WITH DIABETIC NEUROPATHY, WITH LONG-TERM CURRENT USE OF INSULIN: Primary | ICD-10-CM

## 2024-08-06 DIAGNOSIS — Z79.4 CONTROLLED TYPE 2 DIABETES MELLITUS WITH HYPERGLYCEMIA, WITH LONG-TERM CURRENT USE OF INSULIN: ICD-10-CM

## 2024-08-06 DIAGNOSIS — E11.65 UNCONTROLLED TYPE 2 DIABETES MELLITUS WITH HYPERGLYCEMIA: ICD-10-CM

## 2024-08-06 LAB
EXPIRATION DATE: ABNORMAL
GLUCOSE BLDC GLUCOMTR-MCNC: 140 MG/DL (ref 70–99)
HBA1C MFR BLD: 7.2 % (ref 4.5–5.7)
Lab: ABNORMAL

## 2024-08-06 PROCEDURE — 3078F DIAST BP <80 MM HG: CPT | Performed by: NURSE PRACTITIONER

## 2024-08-06 PROCEDURE — 83036 HEMOGLOBIN GLYCOSYLATED A1C: CPT | Performed by: NURSE PRACTITIONER

## 2024-08-06 PROCEDURE — 1159F MED LIST DOCD IN RCRD: CPT | Performed by: NURSE PRACTITIONER

## 2024-08-06 PROCEDURE — 1160F RVW MEDS BY RX/DR IN RCRD: CPT | Performed by: NURSE PRACTITIONER

## 2024-08-06 PROCEDURE — 3074F SYST BP LT 130 MM HG: CPT | Performed by: NURSE PRACTITIONER

## 2024-08-06 PROCEDURE — 82948 REAGENT STRIP/BLOOD GLUCOSE: CPT | Performed by: NURSE PRACTITIONER

## 2024-08-06 PROCEDURE — G0463 HOSPITAL OUTPT CLINIC VISIT: HCPCS | Performed by: NURSE PRACTITIONER

## 2024-08-06 PROCEDURE — 99214 OFFICE O/P EST MOD 30 MIN: CPT | Performed by: NURSE PRACTITIONER

## 2024-08-06 RX ORDER — SEMAGLUTIDE 1.34 MG/ML
0.25 INJECTION, SOLUTION SUBCUTANEOUS WEEKLY
Qty: 1.5 ML | Refills: 0 | Status: SHIPPED | OUTPATIENT
Start: 2024-08-06 | End: 2024-09-05

## 2024-08-06 RX ORDER — ONDANSETRON 4 MG/1
4 TABLET, FILM COATED ORAL EVERY 8 HOURS PRN
Qty: 30 TABLET | Refills: 0 | Status: SHIPPED | OUTPATIENT
Start: 2024-08-06

## 2024-08-06 NOTE — PATIENT INSTRUCTIONS
Epidural Block    Date/Time: 5/10/2022 11:11 PM  Performed by: Lucas Shaw MD  Authorized by: Lucas Shaw MD     Patient Location:  L&D  Indication: Labor Pain    Pre anesthesia checklist Patient Identified (2 criteria), Consent Verified, Necessary Block Equipment Present, Monitors applied, IV Bolus, Allergies confirmed, Block Plan Confirmed, Drugs/Solutions Labeled, IV Access functioning, Timeout Performed, Coagulation Status, Block site marked (if applicable), Resuscitation equipment available, Sedation given (if needed) and Aseptic technique  Epidural:     Patient Position:  Sitting    Prep:  Providone Iodine    Max Sterile Barrier Technique:  Hand washing, cap/mask, sterile gloves, sterile drape and sterile dressing applied    Monitoring:  Heart rate, continuous pulse oximetry and non-invasive blood pressure    Approach:  Midline    Location:  L3-4  Injection Technique:  PHAN air  Ultrasound Used:  No  Needle and Epidural Catheter:     Needle Type:  Tuohy    Needle Gauge:  17 G    Needle Length:  3.5 in    PHAN Depth:  6 cm  Catheter Type:  Side hole  Catheter Size:  19 G  Catheter at Skin Depth:  13 cm  Securement:  Stabilization device, Tape and Transparent dressing  Test Dose:  Lidocaine 1.5% with epinephrine 1-to-200,000  Test Dose Volume (mL):  3  Test Dose Result:  Negative  Initial Local Loading Anesthetic:  Ropivacaine  Initial Local Loading Concentration (%):  0.2  Initial Local Loading Volume (mL):  10  Initial Loading Narcotic Used:  None  Assessment:    Block Outcome: pain improved  Number of Attempts: 1   Procedure Assessment: patient tolerated procedure well with no complications  Staff:     Performed By:  Anesthesiologist    Anesthesiologist:  Lucas Shaw MD  Start Time:  5/10/2022 11:00 PM         Start Ozempic 0.25 mg once weekly.  Make sure to do this injection on the same day of the week.    Decrease your Lantus from 65 units once daily to 60 units once daily    Continue Jardiance 10 mg once daily

## 2024-08-18 DIAGNOSIS — I10 ESSENTIAL HYPERTENSION: ICD-10-CM

## 2024-08-18 DIAGNOSIS — K21.9 GASTROESOPHAGEAL REFLUX DISEASE, UNSPECIFIED WHETHER ESOPHAGITIS PRESENT: ICD-10-CM

## 2024-08-19 RX ORDER — FAMOTIDINE 40 MG/1
40 TABLET, FILM COATED ORAL DAILY
Qty: 90 TABLET | Refills: 3 | Status: SHIPPED | OUTPATIENT
Start: 2024-08-19

## 2024-08-19 RX ORDER — BENAZEPRIL HYDROCHLORIDE 20 MG/1
20 TABLET ORAL DAILY
Qty: 90 TABLET | Refills: 3 | Status: SHIPPED | OUTPATIENT
Start: 2024-08-19

## 2024-08-21 DIAGNOSIS — Z79.4 TYPE 2 DIABETES MELLITUS WITH DIABETIC NEUROPATHY, WITH LONG-TERM CURRENT USE OF INSULIN: ICD-10-CM

## 2024-08-21 DIAGNOSIS — E11.40 TYPE 2 DIABETES MELLITUS WITH DIABETIC NEUROPATHY, WITH LONG-TERM CURRENT USE OF INSULIN: ICD-10-CM

## 2024-08-22 ENCOUNTER — TELEPHONE (OUTPATIENT)
Dept: FAMILY MEDICINE CLINIC | Facility: CLINIC | Age: 74
End: 2024-08-22
Payer: MEDICARE

## 2024-08-22 DIAGNOSIS — F51.5 NIGHTMARES: ICD-10-CM

## 2024-08-22 RX ORDER — PRAZOSIN HYDROCHLORIDE 2 MG/1
2 CAPSULE ORAL NIGHTLY
Qty: 90 CAPSULE | Refills: 0 | Status: SHIPPED | OUTPATIENT
Start: 2024-08-22

## 2024-08-22 RX ORDER — INSULIN GLARGINE 100 [IU]/ML
65 INJECTION, SOLUTION SUBCUTANEOUS DAILY
Qty: 59 ML | Refills: 1 | Status: SHIPPED | OUTPATIENT
Start: 2024-08-22 | End: 2025-02-20

## 2024-08-25 DIAGNOSIS — E55.9 VITAMIN D DEFICIENCY: ICD-10-CM

## 2024-08-26 RX ORDER — ACETAMINOPHEN 160 MG
4000 TABLET,DISINTEGRATING ORAL DAILY
Qty: 180 CAPSULE | Refills: 3 | OUTPATIENT
Start: 2024-08-26

## 2024-08-28 ENCOUNTER — TELEPHONE (OUTPATIENT)
Dept: DIABETES SERVICES | Facility: HOSPITAL | Age: 74
End: 2024-08-28
Payer: MEDICARE

## 2024-08-28 NOTE — TELEPHONE ENCOUNTER
HUB TO RELAY     I WAS CALLING THE PT ABOUT HER PT ASSISTANCE PAPERWORK NEED TO KNOW IF THE PT IS ON MEDICARE OR IF SHE HAS APPLIED FOR IT SO WE CAN GET HER APPROVED ON THE PT ASSISTANCE PROGRAM .     Left a vm asking for a call back to my extension .

## 2024-08-30 ENCOUNTER — OFFICE VISIT (OUTPATIENT)
Dept: FAMILY MEDICINE CLINIC | Facility: CLINIC | Age: 74
End: 2024-08-30
Payer: MEDICARE

## 2024-08-30 VITALS
RESPIRATION RATE: 18 BRPM | TEMPERATURE: 96.1 F | HEART RATE: 72 BPM | OXYGEN SATURATION: 96 % | DIASTOLIC BLOOD PRESSURE: 57 MMHG | BODY MASS INDEX: 38.26 KG/M2 | SYSTOLIC BLOOD PRESSURE: 122 MMHG | WEIGHT: 224.1 LBS | HEIGHT: 64 IN

## 2024-08-30 DIAGNOSIS — K21.9 GASTROESOPHAGEAL REFLUX DISEASE, UNSPECIFIED WHETHER ESOPHAGITIS PRESENT: ICD-10-CM

## 2024-08-30 DIAGNOSIS — E78.2 MIXED HYPERLIPIDEMIA: ICD-10-CM

## 2024-08-30 DIAGNOSIS — Z79.4 TYPE 2 DIABETES MELLITUS WITH DIABETIC NEUROPATHY, WITH LONG-TERM CURRENT USE OF INSULIN: ICD-10-CM

## 2024-08-30 DIAGNOSIS — E11.40 TYPE 2 DIABETES MELLITUS WITH DIABETIC NEUROPATHY, WITH LONG-TERM CURRENT USE OF INSULIN: ICD-10-CM

## 2024-08-30 DIAGNOSIS — I10 ESSENTIAL HYPERTENSION: Primary | ICD-10-CM

## 2024-08-30 DIAGNOSIS — E55.9 VITAMIN D DEFICIENCY: ICD-10-CM

## 2024-08-30 DIAGNOSIS — F51.5 NIGHTMARES: ICD-10-CM

## 2024-08-30 PROCEDURE — 1126F AMNT PAIN NOTED NONE PRSNT: CPT | Performed by: NURSE PRACTITIONER

## 2024-08-30 PROCEDURE — 99214 OFFICE O/P EST MOD 30 MIN: CPT | Performed by: NURSE PRACTITIONER

## 2024-08-30 PROCEDURE — 1159F MED LIST DOCD IN RCRD: CPT | Performed by: NURSE PRACTITIONER

## 2024-08-30 PROCEDURE — 3078F DIAST BP <80 MM HG: CPT | Performed by: NURSE PRACTITIONER

## 2024-08-30 PROCEDURE — 3074F SYST BP LT 130 MM HG: CPT | Performed by: NURSE PRACTITIONER

## 2024-08-30 PROCEDURE — 1160F RVW MEDS BY RX/DR IN RCRD: CPT | Performed by: NURSE PRACTITIONER

## 2024-08-30 RX ORDER — AMLODIPINE BESYLATE 2.5 MG/1
2.5 TABLET ORAL DAILY
Qty: 90 TABLET | Refills: 1 | Status: CANCELLED | OUTPATIENT
Start: 2024-08-30

## 2024-08-30 RX ORDER — MULTIVIT-MIN/IRON/FOLIC ACID/K 18-600-40
4000 CAPSULE ORAL DAILY
Qty: 180 CAPSULE | Refills: 1 | Status: SHIPPED | OUTPATIENT
Start: 2024-08-30

## 2024-08-30 RX ORDER — BENAZEPRIL HYDROCHLORIDE 20 MG/1
20 TABLET ORAL DAILY
Qty: 90 TABLET | Refills: 1 | Status: SHIPPED | OUTPATIENT
Start: 2024-08-30

## 2024-08-30 RX ORDER — FAMOTIDINE 40 MG/1
40 TABLET, FILM COATED ORAL DAILY
Qty: 90 TABLET | Refills: 1 | Status: SHIPPED | OUTPATIENT
Start: 2024-08-30

## 2024-08-30 RX ORDER — ATORVASTATIN CALCIUM 10 MG/1
10 TABLET, FILM COATED ORAL NIGHTLY
Qty: 90 TABLET | Refills: 1 | Status: SHIPPED | OUTPATIENT
Start: 2024-08-30

## 2024-08-30 RX ORDER — GABAPENTIN 400 MG/1
CAPSULE ORAL
COMMUNITY
Start: 2024-07-24

## 2024-08-30 RX ORDER — PRAZOSIN HYDROCHLORIDE 2 MG/1
2 CAPSULE ORAL NIGHTLY
Qty: 90 CAPSULE | Refills: 1 | Status: SHIPPED | OUTPATIENT
Start: 2024-08-30

## 2024-08-30 NOTE — PROGRESS NOTES
Answers submitted by the patient for this visit:  Other (Submitted on 8/29/2024)  Please describe your symptoms.: Follow up from last appointment  Have you had these symptoms before?: Yes  How long have you been having these symptoms?: Greater than 2 weeks  Primary Reason for Visit (Submitted on 8/29/2024)  What is the primary reason for your visit?: Other  Chief Complaint  Follow-up (At home, blood pressure has been around 100-110/50-60. Has been down to 70/50. /), Dizziness (When blood pressure goes down, gets dizzy.), and Diabetes (Been down to 79. Used to be around 150, but has been on the low side. When in pain, it's gone up to 130. )    Subjective            Jane Huang is a 74 y.o. female who presents to [facility]   History of Present Illness  3-month follow-up.  She is accompanied by her daughter Val Michaud and granddaughter Carleen.    Nightmares: Doing better on prazosin 2 mg nightly.    Diabetes type 2, insulin-dependent: She is following with the diabetic clinic.  Last A1c was 7.2%.  Glipizide was stopped due to some severe hypoglycemic episodes.  She is still on Lantus insulin 65 units daily also.  She was prescribed Ozempic but it cost too much. She is taking Jardiance 10 mg daily.  She states she is still having some hypoglycemic episodes.  She states her blood sugar has been as low as 79.  She states her blood sugar was 130 this morning.    Her LDL was at goal, on atorvastatin 10 mg daily.       Vitamin B12 level was slightly elevated so advised her to decrease vitamin B12 supplement to 3x/weekly.     Vitamin D level was low normal and I recommended that she increase vitamin D to 4000 units daily.     Hypertension:  BP has been too low and she has been having dizziness.  She is on amlodipine 2.5 mg, also on benazepril 20 mg daily     GERD: She is stable on famotidine 40 mg daily.    She follows with pain management.  She is on hydrocodone and  "gabapentin.      Tobacco Use: Low Risk  (8/30/2024)    Patient History     Smoking Tobacco Use: Never     Smokeless Tobacco Use: Never     Passive Exposure: Not on file      E-cigarette/Vaping    E-cigarette/Vaping Use Never User      E-cigarette/Vaping Substances     E-cigarette/Vaping Devices       Alcohol Use: Not At Risk (9/7/2021)    AUDIT-C     Frequency of Alcohol Consumption: Never     Average Number of Drinks: Not on file     Frequency of Binge Drinking: Not on file         Objective   Vital Signs:   Vitals:    08/30/24 1418   BP: 122/57   Pulse: 72   Resp: 18   Temp: 96.1 °F (35.6 °C)   TempSrc: Infrared   SpO2: 96%   Weight: 102 kg (224 lb 1.6 oz)   Height: 162.6 cm (64\")     Body mass index is 38.47 kg/m².    Wt Readings from Last 3 Encounters:   08/30/24 102 kg (224 lb 1.6 oz)   08/06/24 103 kg (226 lb)   05/24/24 101 kg (223 lb 3.2 oz)     BP Readings from Last 3 Encounters:   08/30/24 122/57   08/06/24 118/55   05/24/24 132/55       Health Maintenance   Topic Date Due    COVID-19 Vaccine (6 - 2023-24 season) 09/01/2023    BMI FOLLOWUP  07/18/2024    INFLUENZA VACCINE  08/01/2024    ZOSTER VACCINE (1 of 2) 08/30/2024 (Originally 1/16/1998)    URINE MICROALBUMIN  11/10/2024    DXA SCAN  01/25/2025    HEMOGLOBIN A1C  02/06/2025    ANNUAL WELLNESS VISIT  02/16/2025    DIABETIC EYE EXAM  02/20/2025    LIPID PANEL  05/24/2025    TDAP/TD VACCINES (2 - Td or Tdap) 10/26/2032    HEPATITIS C SCREENING  Completed    RSV Vaccine - Adults  Completed    Pneumococcal Vaccine 65+  Completed    COLORECTAL CANCER SCREENING  Discontinued       /57   Pulse 72   Temp 96.1 °F (35.6 °C) (Infrared)   Resp 18   Ht 162.6 cm (64\")   Wt 102 kg (224 lb 1.6 oz)   SpO2 96%   BMI 38.47 kg/m²       Current Outpatient Medications:     atorvastatin (LIPITOR) 10 MG tablet, Take 1 tablet by mouth Every Night. Indications: High Amount of Fats in the Blood, Disp: 90 tablet, Rfl: 1    baclofen (LIORESAL) 10 MG tablet, Take 1 " tablet by mouth 3 (Three) Times a Day., Disp: , Rfl:     benazepril (LOTENSIN) 20 MG tablet, Take 1 tablet by mouth Daily. Indications: High Blood Pressure Disorder, Disp: 90 tablet, Rfl: 1    Blood Glucose Monitoring Suppl device, Use as directed for blood glucose monitoring, Disp: 1 each, Rfl: 0    Calcium Carbonate 1500 (600 Ca) MG tablet, Take 1 tablet by mouth 2 (two) times a day., Disp: , Rfl:     cetirizine (zyrTEC) 10 MG tablet, Take 1 tablet by mouth Daily., Disp: , Rfl:     empagliflozin (Jardiance) 10 MG tablet tablet, Take 1 tablet by mouth Daily., Disp: , Rfl:     famotidine (PEPCID) 40 MG tablet, Take 1 tablet by mouth Daily. Indications: Gastroesophageal Reflux Disease, Disp: 90 tablet, Rfl: 1    gabapentin (NEURONTIN) 400 MG capsule, TAKE 1 CAPSULE BY MOUTH THREE TIMES DAILY AS DIRECTED, Disp: , Rfl:     glucose blood test strip, Test blood glucose 3 times each day, Disp: 300 each, Rfl: 5    HYDROcodone-acetaminophen (NORCO)  MG per tablet, Take 1 tablet by mouth Every 12 (Twelve) Hours As Needed., Disp: , Rfl:     Insulin Glargine (Lantus SoloStar) 100 UNIT/ML injection pen, Inject 65 Units under the skin into the appropriate area as directed Daily for 182 days., Disp: 59 mL, Rfl: 1    Insulin Pen Needle (Pen Needles) 32G X 4 MM misc, Use 1 each Daily., Disp: 100 each, Rfl: 5    Lancets misc, Test blood glucose 3 times each day, Disp: 300 each, Rfl: 5    ondansetron (Zofran) 4 MG tablet, Take 1 tablet by mouth Every 8 (Eight) Hours As Needed for Nausea or Vomiting., Disp: 30 tablet, Rfl: 0    prazosin (MINIPRESS) 2 MG capsule, Take 1 capsule by mouth Every Night. Indications: Frightening Dreams, Disp: 90 capsule, Rfl: 1    vitamin B-12 (CYANOCOBALAMIN) 1000 MCG tablet, TAKE 1 TABLET BY MOUTH EVERY DAY, Disp: 90 tablet, Rfl: 1    Vitamin D, Cholecalciferol, 50 MCG (2000 UT) capsule, Take 2 capsules by mouth Daily. Indications: Vitamin D Deficiency, Disp: 180 capsule, Rfl: 1   Past Medical  History:   Diagnosis Date    Acid reflux     Allergies     Arthritis     Broken bones     Cataracts, bilateral     Chronic allergic rhinitis     Claustrophobia     Colitis     CTS (carpal tunnel syndrome)     Right    Depression 09/09/2020    Diabetes     Diabetes mellitus, type 2 06/11/2019    Diverticulitis     Encounter for screening breast examination     Essential hypertension 06/11/2019    Essential tremor 06/11/2019    Forgetfulness     Gall stones     GERD (gastroesophageal reflux disease) 06/02/2021    High blood pressure     HTN (hypertension), benign     Insomnia, unspecified 09/09/2020    Kidney stones     Low back pain     Lumbosacral disc disease     Mixed hyperlipidemia 09/15/2022    Screening for colon cancer 03/18/2013    Sinus trouble     Toe deformity 03/02/2021    Vitamin B12 deficiency 09/09/2020    Vitamin D deficiency 12/09/2020        Physical Exam  Vitals reviewed.   Constitutional:       Appearance: Normal appearance. She is well-developed. She is obese.   Neck:      Thyroid: No thyroid mass, thyromegaly or thyroid tenderness.   Cardiovascular:      Rate and Rhythm: Normal rate and regular rhythm.      Heart sounds: No murmur heard.     No friction rub. No gallop.   Pulmonary:      Effort: Pulmonary effort is normal.      Breath sounds: Normal breath sounds. No wheezing or rhonchi.   Lymphadenopathy:      Cervical: No cervical adenopathy.   Skin:     General: Skin is warm and dry.   Neurological:      Mental Status: She is alert and oriented to person, place, and time.      Cranial Nerves: No cranial nerve deficit.   Psychiatric:         Mood and Affect: Mood and affect normal.         Behavior: Behavior normal.         Thought Content: Thought content normal. Thought content does not include homicidal or suicidal ideation.         Judgment: Judgment normal.          Result Review :    The following data was reviewed by: NHUNG Ramos on 08/30/2024:  Common Labs   Common labs           5/9/2024    14:18 5/24/2024    15:11 8/6/2024    14:28   Common Labs   Glucose  92     BUN  18     Creatinine  0.77     Sodium  141     Potassium  4.4     Chloride  108     Calcium  8.7     Albumin  3.5     Total Bilirubin  0.4     Alkaline Phosphatase  107     AST (SGOT)  35     ALT (SGPT)  33     WBC  4.19     Hemoglobin  15.0     Hematocrit  45.2     Platelets  139     Total Cholesterol  99     Triglycerides  79     HDL Cholesterol  37     LDL Cholesterol   46     Hemoglobin A1C 6.9   7.2      TSH   TSH          10/11/2023    13:47 5/24/2024    15:11   TSH   TSH 2.800  2.430      VITD   Lab Results   Component Value Date    QKGJ13CL 37.8 05/24/2024            XR Foot 3+ View Left    Result Date: 5/21/2024  1.  Severe first metatarsophalangeal joint arthrosis. 2.  Suspect pes planus deformity. Electronically Signed: Ernesto Torres MD 2024/05/21 at 11:43 CDT Reading Location ID and State: [address] / [place] Tel [phone], Service support  [phone], Fax [phone]      Assessment & Plan  Essential hypertension  Hypertension is controlled but having low blood pressure with dizziness.  Medication changes per orders.  Ambulatory blood pressure monitoring.  I will have her stop amlodipine 2.5 mg daily and she will continue benazepril 20 mg daily.  Blood pressure will be reassessed in 3 months.  Type 2 diabetes mellitus with diabetic neuropathy, with long-term current use of insulin  Diabetes is  not stable.  She is having hypoglycemia episodes .   I reached out to NHUNG Rock with diabetic clinic and advised her of patient's hypoglycemic episodes.  She advised to tell patient to decrease Lantus dose to 60 units once daily and continue Jardiance.  I did advise patient to call the diabetic clinic if she has any more episodes of hypoglycemia.  Diabetes will be reassessed in 3 months  Mixed hyperlipidemia   Lipid abnormalities are stable    Plan:  Continue same medication/s without change.      Discussed  medication dosage, use, side effects, and goals of treatment in detail.    Counseled patient on lifestyle modifications to help control hyperlipidemia.     Patient Treatment Goals:   LDL goal is less than 70    Followup in 3 months.  Gastroesophageal reflux disease, unspecified whether esophagitis present  GERD is stable on famotidine, will continue current dose.  Nightmares  Psychological condition is stable.  Continue current treatment regimen.  Psychological condition  will be reassessed in 3 months.  Vitamin D deficiency  Vitamin D is stable, will continue 4000 units daily.     New Medications Ordered This Visit   Medications    atorvastatin (LIPITOR) 10 MG tablet     Sig: Take 1 tablet by mouth Every Night. Indications: High Amount of Fats in the Blood     Dispense:  90 tablet     Refill:  1    benazepril (LOTENSIN) 20 MG tablet     Sig: Take 1 tablet by mouth Daily. Indications: High Blood Pressure Disorder     Dispense:  90 tablet     Refill:  1    famotidine (PEPCID) 40 MG tablet     Sig: Take 1 tablet by mouth Daily. Indications: Gastroesophageal Reflux Disease     Dispense:  90 tablet     Refill:  1    prazosin (MINIPRESS) 2 MG capsule     Sig: Take 1 capsule by mouth Every Night. Indications: Frightening Dreams     Dispense:  90 capsule     Refill:  1    Vitamin D, Cholecalciferol, 50 MCG (2000 UT) capsule     Sig: Take 2 capsules by mouth Daily. Indications: Vitamin D Deficiency     Dispense:  180 capsule     Refill:  1           Diagnosis Plan   1. Essential hypertension  benazepril (LOTENSIN) 20 MG tablet      2. Type 2 diabetes mellitus with diabetic neuropathy, with long-term current use of insulin        3. Mixed hyperlipidemia  atorvastatin (LIPITOR) 10 MG tablet      4. Gastroesophageal reflux disease, unspecified whether esophagitis present  famotidine (PEPCID) 40 MG tablet      5. Nightmares  prazosin (MINIPRESS) 2 MG capsule      6. Vitamin D deficiency  Vitamin D, Cholecalciferol, 50 MCG (2000  UT) capsule            FOLLOW UP  Return in about 3 months (around 11/30/2024) for 30 min apt for complex pt.  Patient was given instructions and counseling regarding her condition or for health maintenance advice. Please see specific information pulled into the AVS if appropriate.       CURRENT & DISCONTINUED MEDICATIONS  Current Outpatient Medications   Medication Instructions    atorvastatin (LIPITOR) 10 mg, Oral, Nightly    baclofen (LIORESAL) 10 mg, Oral, 3 Times Daily    benazepril (LOTENSIN) 20 mg, Oral, Daily    Blood Glucose Monitoring Suppl device Use as directed for blood glucose monitoring    Calcium Carbonate 1500 (600 Ca) MG tablet 1 tablet, Oral, 2 times daily    cetirizine (zyrTEC) 10 MG tablet 1 tablet, Oral, Daily    empagliflozin (JARDIANCE) 10 mg, Oral, Daily    famotidine (PEPCID) 40 mg, Oral, Daily    gabapentin (NEURONTIN) 400 MG capsule TAKE 1 CAPSULE BY MOUTH THREE TIMES DAILY AS DIRECTED    glucose blood test strip Test blood glucose 3 times each day    HYDROcodone-acetaminophen (NORCO)  MG per tablet 1 tablet, Oral, Every 12 Hours PRN    Insulin Pen Needle (Pen Needles) 32G X 4 MM misc 1 each, Does not apply, Daily    Lancets misc Test blood glucose 3 times each day    Lantus SoloStar 65 Units, Subcutaneous, Daily    ondansetron (ZOFRAN) 4 mg, Oral, Every 8 Hours PRN    prazosin (MINIPRESS) 2 mg, Oral, Nightly    vitamin B-12 (CYANOCOBALAMIN) 1000 MCG tablet TAKE 1 TABLET BY MOUTH EVERY DAY    Vitamin D (Cholecalciferol) 100 mcg, Oral, Daily       Medications Discontinued During This Encounter   Medication Reason    Semaglutide,0.25 or 0.5MG/DOS, (Ozempic, 0.25 or 0.5 MG/DOSE,) 2 MG/1.5ML solution pen-injector Patient Reported Not Taking    amLODIPine (NORVASC) 2.5 MG tablet Side effects    Vitamin D, Cholecalciferol, 50 MCG (2000 UT) capsule Reorder    atorvastatin (LIPITOR) 10 MG tablet Reorder    benazepril (LOTENSIN) 20 MG tablet Reorder    famotidine (PEPCID) 40 MG tablet  Reorder    prazosin (MINIPRESS) 2 MG capsule Reorder        Parts of this note are electronic transcriptions/translations of spoken language to printed text using the Dragon Dictation system.    NHUNG Ramos  08/30/24  15:03 EDT

## 2024-08-30 NOTE — ASSESSMENT & PLAN NOTE
Diabetes is not stable.  She is having hypoglycemia episodes.   I reached out to NHUNG Rock with diabetic clinic and advised her of patient's hypoglycemic episodes.  She advised to tell patient to decrease Lantus dose to 60 units once daily and continue Jardiance. I did advise patient to call the diabetic clinic if she has any more episodes of hypoglycemia.  Diabetes will be reassessed in 3 months

## 2024-08-30 NOTE — ASSESSMENT & PLAN NOTE
Hypertension is controlled but having low blood pressure with dizziness.  Medication changes per orders.  Ambulatory blood pressure monitoring.  I will have her stop amlodipine 2.5 mg daily and she will continue benazepril 20 mg daily.  Blood pressure will be reassessed in 3 months.

## 2024-09-06 ENCOUNTER — PATIENT ROUNDING (BHMG ONLY) (OUTPATIENT)
Dept: FAMILY MEDICINE CLINIC | Facility: CLINIC | Age: 74
End: 2024-09-06
Payer: MEDICARE

## 2024-09-19 ENCOUNTER — TELEPHONE (OUTPATIENT)
Dept: DIABETES SERVICES | Facility: HOSPITAL | Age: 74
End: 2024-09-19
Payer: MEDICARE

## 2024-10-04 ENCOUNTER — TRANSCRIBE ORDERS (OUTPATIENT)
Dept: ADMINISTRATIVE | Facility: HOSPITAL | Age: 74
End: 2024-10-04
Payer: MEDICARE

## 2024-10-04 DIAGNOSIS — Z12.31 ENCOUNTER FOR SCREENING MAMMOGRAM FOR BREAST CANCER: Primary | ICD-10-CM

## 2024-10-08 ENCOUNTER — OFFICE VISIT (OUTPATIENT)
Dept: DIABETES SERVICES | Facility: HOSPITAL | Age: 74
End: 2024-10-08
Payer: MEDICARE

## 2024-10-08 VITALS
SYSTOLIC BLOOD PRESSURE: 134 MMHG | BODY MASS INDEX: 38.62 KG/M2 | OXYGEN SATURATION: 96 % | DIASTOLIC BLOOD PRESSURE: 70 MMHG | HEIGHT: 64 IN | HEART RATE: 61 BPM | WEIGHT: 226.2 LBS

## 2024-10-08 DIAGNOSIS — Z79.4 TYPE 2 DIABETES MELLITUS WITH DIABETIC NEUROPATHY, WITH LONG-TERM CURRENT USE OF INSULIN: ICD-10-CM

## 2024-10-08 DIAGNOSIS — E11.40 TYPE 2 DIABETES MELLITUS WITH DIABETIC NEUROPATHY, WITH LONG-TERM CURRENT USE OF INSULIN: ICD-10-CM

## 2024-10-08 DIAGNOSIS — E66.9 OBESITY (BMI 30-39.9): ICD-10-CM

## 2024-10-08 DIAGNOSIS — E11.65 UNCONTROLLED TYPE 2 DIABETES MELLITUS WITH HYPERGLYCEMIA: Primary | ICD-10-CM

## 2024-10-08 LAB
EXPIRATION DATE: ABNORMAL
GLUCOSE BLDC GLUCOMTR-MCNC: 141 MG/DL (ref 70–99)
HBA1C MFR BLD: 7.2 % (ref 4.5–5.7)
Lab: ABNORMAL

## 2024-10-08 PROCEDURE — 1159F MED LIST DOCD IN RCRD: CPT | Performed by: NURSE PRACTITIONER

## 2024-10-08 PROCEDURE — 1160F RVW MEDS BY RX/DR IN RCRD: CPT | Performed by: NURSE PRACTITIONER

## 2024-10-08 PROCEDURE — 82948 REAGENT STRIP/BLOOD GLUCOSE: CPT | Performed by: NURSE PRACTITIONER

## 2024-10-08 PROCEDURE — 99214 OFFICE O/P EST MOD 30 MIN: CPT | Performed by: NURSE PRACTITIONER

## 2024-10-08 PROCEDURE — 3075F SYST BP GE 130 - 139MM HG: CPT | Performed by: NURSE PRACTITIONER

## 2024-10-08 PROCEDURE — 83036 HEMOGLOBIN GLYCOSYLATED A1C: CPT | Performed by: NURSE PRACTITIONER

## 2024-10-08 PROCEDURE — 3078F DIAST BP <80 MM HG: CPT | Performed by: NURSE PRACTITIONER

## 2024-10-08 PROCEDURE — G0463 HOSPITAL OUTPT CLINIC VISIT: HCPCS | Performed by: NURSE PRACTITIONER

## 2024-10-08 RX ORDER — INSULIN GLARGINE 100 [IU]/ML
60 INJECTION, SOLUTION SUBCUTANEOUS DAILY
Start: 2024-10-08 | End: 2025-04-08

## 2024-10-08 NOTE — PATIENT INSTRUCTIONS
Increase Jardiance from 10 mg once daily to 25 mg once daily.  You can finish your current 10 mg tabs by taking 2 tabs to equal 20 mg until you finish the sample supply then start on the Jardiance 25 mg once daily tabs.  Make sure to drink plenty water with this medication to avoid dehydration, yeast infections and UTIs.
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
## 2024-10-31 ENCOUNTER — HOSPITAL ENCOUNTER (OUTPATIENT)
Dept: MAMMOGRAPHY | Facility: HOSPITAL | Age: 74
Discharge: HOME | End: 2024-10-31
Admitting: NURSE PRACTITIONER
Payer: MEDICARE

## 2024-10-31 DIAGNOSIS — Z12.31 ENCOUNTER FOR SCREENING MAMMOGRAM FOR BREAST CANCER: ICD-10-CM

## 2024-10-31 PROCEDURE — 77063 BREAST TOMOSYNTHESIS BI: CPT

## 2024-10-31 PROCEDURE — 77067 SCR MAMMO BI INCL CAD: CPT

## 2024-11-14 ENCOUNTER — TELEPHONE (OUTPATIENT)
Dept: DIABETES SERVICES | Facility: HOSPITAL | Age: 74
End: 2024-11-14
Payer: MEDICARE

## 2024-11-30 DIAGNOSIS — E11.40 TYPE 2 DIABETES MELLITUS WITH DIABETIC NEUROPATHY, WITH LONG-TERM CURRENT USE OF INSULIN: ICD-10-CM

## 2024-11-30 DIAGNOSIS — Z79.4 TYPE 2 DIABETES MELLITUS WITH DIABETIC NEUROPATHY, WITH LONG-TERM CURRENT USE OF INSULIN: ICD-10-CM

## 2024-12-02 RX ORDER — GLUCOSAM/CHON-MSM1/C/MANG/BOSW 500-416.6
TABLET ORAL
Qty: 300 EACH | Refills: 3 | Status: SHIPPED | OUTPATIENT
Start: 2024-12-02

## 2024-12-02 RX ORDER — CALCIUM CITRATE/VITAMIN D3 200MG-6.25
1 TABLET ORAL 3 TIMES DAILY
Qty: 200 EACH | Refills: 3 | Status: SHIPPED | OUTPATIENT
Start: 2024-12-02

## 2025-01-15 ENCOUNTER — OFFICE VISIT (OUTPATIENT)
Dept: DIABETES SERVICES | Facility: HOSPITAL | Age: 75
End: 2025-01-15
Payer: MEDICARE

## 2025-01-15 VITALS
DIASTOLIC BLOOD PRESSURE: 68 MMHG | OXYGEN SATURATION: 96 % | SYSTOLIC BLOOD PRESSURE: 134 MMHG | HEIGHT: 64 IN | WEIGHT: 220.2 LBS | BODY MASS INDEX: 37.59 KG/M2 | HEART RATE: 69 BPM

## 2025-01-15 DIAGNOSIS — E11.9 TYPE 2 DIABETES MELLITUS WITH HEMOGLOBIN A1C GOAL OF LESS THAN 7.0%: Primary | ICD-10-CM

## 2025-01-15 DIAGNOSIS — E11.65 CONTROLLED TYPE 2 DIABETES MELLITUS WITH HYPERGLYCEMIA, WITH LONG-TERM CURRENT USE OF INSULIN: ICD-10-CM

## 2025-01-15 DIAGNOSIS — E11.40 TYPE 2 DIABETES MELLITUS WITH DIABETIC NEUROPATHY, WITH LONG-TERM CURRENT USE OF INSULIN: ICD-10-CM

## 2025-01-15 DIAGNOSIS — Z79.4 CONTROLLED TYPE 2 DIABETES MELLITUS WITH HYPERGLYCEMIA, WITH LONG-TERM CURRENT USE OF INSULIN: ICD-10-CM

## 2025-01-15 DIAGNOSIS — Z79.4 TYPE 2 DIABETES MELLITUS WITH DIABETIC NEUROPATHY, WITH LONG-TERM CURRENT USE OF INSULIN: ICD-10-CM

## 2025-01-15 DIAGNOSIS — E66.9 OBESITY (BMI 30-39.9): ICD-10-CM

## 2025-01-15 LAB
EXPIRATION DATE: ABNORMAL
GLUCOSE BLDC GLUCOMTR-MCNC: 99 MG/DL (ref 70–99)
HBA1C MFR BLD: 6.9 % (ref 4.5–5.7)
Lab: ABNORMAL

## 2025-01-15 PROCEDURE — 83036 HEMOGLOBIN GLYCOSYLATED A1C: CPT | Performed by: NURSE PRACTITIONER

## 2025-01-15 PROCEDURE — 3075F SYST BP GE 130 - 139MM HG: CPT | Performed by: NURSE PRACTITIONER

## 2025-01-15 PROCEDURE — 99214 OFFICE O/P EST MOD 30 MIN: CPT | Performed by: NURSE PRACTITIONER

## 2025-01-15 PROCEDURE — 82948 REAGENT STRIP/BLOOD GLUCOSE: CPT | Performed by: NURSE PRACTITIONER

## 2025-01-15 PROCEDURE — 3078F DIAST BP <80 MM HG: CPT | Performed by: NURSE PRACTITIONER

## 2025-01-15 PROCEDURE — G0463 HOSPITAL OUTPT CLINIC VISIT: HCPCS | Performed by: NURSE PRACTITIONER

## 2025-01-15 PROCEDURE — 1160F RVW MEDS BY RX/DR IN RCRD: CPT | Performed by: NURSE PRACTITIONER

## 2025-01-15 PROCEDURE — 1159F MED LIST DOCD IN RCRD: CPT | Performed by: NURSE PRACTITIONER

## 2025-01-15 RX ORDER — INSULIN GLARGINE 100 [IU]/ML
60 INJECTION, SOLUTION SUBCUTANEOUS DAILY
Qty: 54 ML | Refills: 1 | Status: SHIPPED | OUTPATIENT
Start: 2025-01-15 | End: 2025-04-15
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
## 2025-01-27 DIAGNOSIS — E78.2 MIXED HYPERLIPIDEMIA: ICD-10-CM

## 2025-01-27 RX ORDER — ATORVASTATIN CALCIUM 10 MG/1
10 TABLET, FILM COATED ORAL NIGHTLY
Qty: 90 TABLET | Refills: 3 | OUTPATIENT
Start: 2025-01-27

## 2025-01-28 ENCOUNTER — OFFICE VISIT (OUTPATIENT)
Dept: FAMILY MEDICINE CLINIC | Facility: CLINIC | Age: 75
End: 2025-01-28
Payer: MEDICARE

## 2025-01-28 VITALS
BODY MASS INDEX: 37.73 KG/M2 | RESPIRATION RATE: 18 BRPM | HEIGHT: 64 IN | DIASTOLIC BLOOD PRESSURE: 61 MMHG | TEMPERATURE: 98.6 F | OXYGEN SATURATION: 98 % | HEART RATE: 69 BPM | SYSTOLIC BLOOD PRESSURE: 137 MMHG | WEIGHT: 221 LBS

## 2025-01-28 DIAGNOSIS — K21.9 GASTROESOPHAGEAL REFLUX DISEASE, UNSPECIFIED WHETHER ESOPHAGITIS PRESENT: ICD-10-CM

## 2025-01-28 DIAGNOSIS — E11.40 TYPE 2 DIABETES MELLITUS WITH DIABETIC NEUROPATHY, WITH LONG-TERM CURRENT USE OF INSULIN: ICD-10-CM

## 2025-01-28 DIAGNOSIS — E66.812 CLASS 2 SEVERE OBESITY WITH SERIOUS COMORBIDITY AND BODY MASS INDEX (BMI) OF 37.0 TO 37.9 IN ADULT, UNSPECIFIED OBESITY TYPE: ICD-10-CM

## 2025-01-28 DIAGNOSIS — E55.9 VITAMIN D DEFICIENCY: ICD-10-CM

## 2025-01-28 DIAGNOSIS — E66.01 CLASS 2 SEVERE OBESITY WITH SERIOUS COMORBIDITY AND BODY MASS INDEX (BMI) OF 37.0 TO 37.9 IN ADULT, UNSPECIFIED OBESITY TYPE: ICD-10-CM

## 2025-01-28 DIAGNOSIS — I10 ESSENTIAL HYPERTENSION: ICD-10-CM

## 2025-01-28 DIAGNOSIS — F51.5 NIGHTMARES: ICD-10-CM

## 2025-01-28 DIAGNOSIS — Z00.00 MEDICARE ANNUAL WELLNESS VISIT, SUBSEQUENT: Primary | ICD-10-CM

## 2025-01-28 DIAGNOSIS — Z78.9 VARICELLA VACCINATION STATUS UNKNOWN: ICD-10-CM

## 2025-01-28 DIAGNOSIS — D69.6 PLATELETS DECREASED: ICD-10-CM

## 2025-01-28 DIAGNOSIS — J30.89 NON-SEASONAL ALLERGIC RHINITIS, UNSPECIFIED TRIGGER: ICD-10-CM

## 2025-01-28 DIAGNOSIS — Z78.0 POSTMENOPAUSAL: ICD-10-CM

## 2025-01-28 DIAGNOSIS — E78.2 MIXED HYPERLIPIDEMIA: ICD-10-CM

## 2025-01-28 DIAGNOSIS — E53.8 VITAMIN B12 DEFICIENCY: ICD-10-CM

## 2025-01-28 DIAGNOSIS — Z79.4 TYPE 2 DIABETES MELLITUS WITH DIABETIC NEUROPATHY, WITH LONG-TERM CURRENT USE OF INSULIN: ICD-10-CM

## 2025-01-28 PROCEDURE — 96160 PT-FOCUSED HLTH RISK ASSMT: CPT | Performed by: NURSE PRACTITIONER

## 2025-01-28 PROCEDURE — 3078F DIAST BP <80 MM HG: CPT | Performed by: NURSE PRACTITIONER

## 2025-01-28 PROCEDURE — G2211 COMPLEX E/M VISIT ADD ON: HCPCS | Performed by: NURSE PRACTITIONER

## 2025-01-28 PROCEDURE — G0439 PPPS, SUBSEQ VISIT: HCPCS | Performed by: NURSE PRACTITIONER

## 2025-01-28 PROCEDURE — 3075F SYST BP GE 130 - 139MM HG: CPT | Performed by: NURSE PRACTITIONER

## 2025-01-28 PROCEDURE — 1170F FXNL STATUS ASSESSED: CPT | Performed by: NURSE PRACTITIONER

## 2025-01-28 PROCEDURE — 1160F RVW MEDS BY RX/DR IN RCRD: CPT | Performed by: NURSE PRACTITIONER

## 2025-01-28 PROCEDURE — 1159F MED LIST DOCD IN RCRD: CPT | Performed by: NURSE PRACTITIONER

## 2025-01-28 PROCEDURE — 99214 OFFICE O/P EST MOD 30 MIN: CPT | Performed by: NURSE PRACTITIONER

## 2025-01-28 PROCEDURE — 1126F AMNT PAIN NOTED NONE PRSNT: CPT | Performed by: NURSE PRACTITIONER

## 2025-01-28 RX ORDER — ATORVASTATIN CALCIUM 10 MG/1
10 TABLET, FILM COATED ORAL NIGHTLY
Qty: 90 TABLET | Refills: 1 | Status: SHIPPED | OUTPATIENT
Start: 2025-01-28

## 2025-01-28 RX ORDER — BENAZEPRIL HYDROCHLORIDE 20 MG/1
20 TABLET ORAL DAILY
Qty: 90 TABLET | Refills: 1 | Status: SHIPPED | OUTPATIENT
Start: 2025-01-28

## 2025-01-28 RX ORDER — MULTIVIT-MIN/IRON/FOLIC ACID/K 18-600-40
4000 CAPSULE ORAL DAILY
Qty: 180 CAPSULE | Refills: 1 | Status: SHIPPED | OUTPATIENT
Start: 2025-01-28

## 2025-01-28 RX ORDER — CETIRIZINE HYDROCHLORIDE 10 MG/1
10 TABLET ORAL DAILY
Qty: 90 TABLET | Refills: 3 | Status: SHIPPED | OUTPATIENT
Start: 2025-01-28

## 2025-01-28 RX ORDER — FAMOTIDINE 40 MG/1
40 TABLET, FILM COATED ORAL DAILY
Qty: 90 TABLET | Refills: 1 | Status: SHIPPED | OUTPATIENT
Start: 2025-01-28

## 2025-01-28 RX ORDER — PRAZOSIN HYDROCHLORIDE 2 MG/1
2 CAPSULE ORAL NIGHTLY
Qty: 90 CAPSULE | Refills: 1 | Status: SHIPPED | OUTPATIENT
Start: 2025-01-28

## 2025-01-28 NOTE — ASSESSMENT & PLAN NOTE
Currently stable, will continue vitamin D 4000 units daily.  Vitamin D level with labs today.  Orders:    Vitamin D, Cholecalciferol, 50 MCG (2000 UT) capsule; Take 2 capsules by mouth Daily. Indications: Vitamin D Deficiency    Vitamin D,25-Hydroxy

## 2025-01-28 NOTE — PATIENT INSTRUCTIONS
Obesity, Adult  Obesity is the condition of having too much total body fat. Being overweight or obese means that your weight is greater than what is considered healthy for your body size. Obesity is determined by a measurement called BMI (body mass index). BMI is an estimate of body fat and is calculated from height and weight. For adults, a BMI of 30 or higher is considered obese.  Obesity can lead to other health concerns and major illnesses, including:  Stroke.  Coronary artery disease (CAD).  Type 2 diabetes.  Some types of cancer, including cancers of the colon, breast, uterus, and gallbladder.  High blood pressure (hypertension).  High cholesterol.  Gallbladder stones.  Obesity can also contribute to:  Osteoarthritis.  Sleep apnea.  Infertility problems.  What are the causes?  Common causes of this condition include:  Eating daily meals that are high in calories, sugar, and fat.  Drinking high amounts of sugar-sweetened beverages, such as soft drinks.  Being born with genes that may make you more likely to become obese.  Having a medical condition that causes obesity, including:  Hypothyroidism.  Polycystic ovarian syndrome (PCOS).  Binge-eating disorder.  Cushing syndrome.  Taking certain medicines, such as steroids, antidepressants, and seizure medicines.  Not being physically active (sedentary lifestyle).  Not getting enough sleep.  What increases the risk?  The following factors may make you more likely to develop this condition:  Having a family history of obesity.  Living in an area with limited access to:  [place], recreation centers, or sidewalks.  Healthy food choices, such as grocery stores and [employer] markets.  What are the signs or symptoms?  The main sign of this condition is having too much body fat.  How is this diagnosed?  This condition is diagnosed based on:  Your BMI. If you are an adult with a BMI of 30 or higher, you are considered obese.  Your waist circumference. This measures the  distance around your waistline.  Your skinfold thickness. Your health care provider may gently pinch a fold of your skin and measure it.  You may have other tests to check for underlying conditions.  How is this treated?  Treatment for this condition often includes changing your lifestyle. Treatment may include some or all of the following:  Dietary changes. This may include developing a healthy meal plan.  Regular physical activity. This may include activity that causes your heart to beat faster (aerobic exercise) and strength training. Work with your health care provider to design an exercise program that works for you.  Medicine to help you lose weight if you are unable to lose one pound a week after six weeks of healthy eating and more physical activity.  Treating conditions that cause the obesity (underlying conditions).  Surgery. Surgical options may include gastric banding and gastric bypass. Surgery may be done if:  Other treatments have not helped to improve your condition.  You have a BMI of 40 or higher.  You have life-threatening health problems related to obesity.  Follow these instructions at home:  Eating and drinking    Follow recommendations from your health care provider about what you eat and drink. Your health care provider may advise you to:  Limit fast food, sweets, and processed snack foods.  Choose low-fat options, such as low-fat milk instead of whole milk.  Eat five or more servings of fruits or vegetables every day.  Choose healthy foods when you eat out.  Keep low-fat snacks available.  Limit sugary drinks, such as soda, fruit juice, sweetened iced tea, and flavored milk.  Drink enough water to keep your urine pale yellow.  Do not follow a fad diet. Fad diets can be unhealthy and even dangerous.  Other healthful choices include:  Eat at home more often. This gives you more control over what you eat.  Learn to read food labels. This will help you understand how much food is considered one  serving.  Learn what a healthy serving size is.  Physical activity  Exercise regularly, as told by your health care provider.  Most adults should get up to 150 minutes of moderate-intensity exercise every week.  Ask your health care provider what types of exercise are safe for you and how often you should exercise.  Warm up and stretch before being active.  Cool down and stretch after being active.  Rest between periods of activity.  Lifestyle  Work with your health care provider and a dietitian to set a weight-loss goal that is healthy and reasonable for you.  Limit your screen time.  Find ways to reward yourself that do not involve food.  Do not drink alcohol if:  Your health care provider tells you not to drink.  You are pregnant, may be pregnant, or are planning to become pregnant.  If you drink alcohol:  Limit how much you have to:  0-1 drink a day for women.  0-2 drinks a day for men.  Know how much alcohol is in your drink. In the [place] one drink equals one 12 oz bottle of beer (355 mL), one 5 oz glass of wine (148 mL), or one 1½ oz glass of hard liquor (44 mL).  General instructions  Keep a weight-loss journal to keep track of the food you eat and how much exercise you get.  Take over-the-counter and prescription medicines only as told by your health care provider.  Take vitamins and supplements only as told by your health care provider.  Consider joining a support group. Your health care provider may be able to recommend a support group.  Pay attention to your mental health as obesity can lead to depression or self esteem issues.  Keep all follow-up visits. This is important.  Contact a health care provider if:  You are unable to meet your weight-loss goal after six weeks of dietary and lifestyle changes.  You have trouble breathing.  Summary  Obesity is the condition of having too much total body fat.  Being overweight or obese means that your weight is greater than what is considered healthy for your body  size.  Work with your health care provider and a dietitian to set a weight-loss goal that is healthy and reasonable for you.  Exercise regularly, as told by your health care provider. Ask your health care provider what types of exercise are safe for you and how often you should exercise.  This information is not intended to replace advice given to you by your health care provider. Make sure you discuss any questions you have with your health care provider.  Document Revised: 07/26/2022 Document Reviewed: 07/26/2022  [employer] Patient Education © 2024 [employer]  Exercising to Lose Weight  Getting regular exercise is important for everyone. It is especially important if you are overweight. Being overweight increases your risk of heart disease, stroke, diabetes, high blood pressure, and several types of cancer. Exercising, and reducing the calories you consume, can help you lose weight and improve fitness and health.  Exercise can be moderate or vigorous intensity. To lose weight, most people need to do a certain amount of moderate or vigorous-intensity exercise each week.  How can exercise affect me?  You lose weight when you exercise enough to burn more calories than you eat. Exercise also reduces body fat and builds muscle. The more muscle you have, the more calories you burn. Exercise also:  Improves mood.  Reduces stress and tension.  Improves your overall fitness, flexibility, and endurance.  Increases bone strength.  Moderate-intensity exercise    Moderate-intensity exercise is any activity that gets you moving enough to burn at least three times more energy (calories) than if you were sitting.  Examples of moderate exercise include:  Walking a mile in 15 minutes.  Doing light yard work.  Biking at an easy pace.  Most people should get at least 150 minutes of moderate-intensity exercise a week to maintain their body weight.  Vigorous-intensity exercise  Vigorous-intensity exercise is any activity that gets  you moving enough to burn at least six times more calories than if you were sitting. When you exercise at this intensity, you should be working hard enough that you are not able to carry on a conversation.  Examples of vigorous exercise include:  Running.  Playing a team sport, such as football, basketball, and soccer.  Jumping rope.  Most people should get at least 75 minutes a week of vigorous exercise to maintain their body weight.  What actions can I take to lose weight?  The amount of exercise you need to lose weight depends on:  Your age.  The type of exercise.  Any health conditions you have.  Your overall physical ability.  Talk to your health care provider about how much exercise you need and what types of activities are safe for you.  Nutrition    Make changes to your diet as told by your health care provider or diet and nutrition specialist (dietitian). This may include:  Eating fewer calories.  Eating more protein.  Eating less unhealthy fats.  Eating a diet that includes fresh fruits and vegetables, whole grains, low-fat dairy products, and lean protein.  Avoiding foods with added fat, salt, and sugar.  Drink plenty of water while you exercise to prevent dehydration or heat stroke.  Activity  Choose an activity that you enjoy and set realistic goals. Your health care provider can help you make an exercise plan that works for you.  Exercise at a moderate or vigorous intensity most days of the week.  The intensity of exercise may vary from person to person. You can tell how intense a workout is for you by paying attention to your breathing and heartbeat. Most people will notice their breathing and heartbeat get faster with more intense exercise.  Do resistance training twice each week, such as:  Push-ups.  Sit-ups.  Lifting weights.  Using resistance bands.  Getting short amounts of exercise can be just as helpful as long, structured periods of exercise. If you have trouble finding time to exercise, try  doing these things as part of your daily routine:  Get up, stretch, and walk around every 30 minutes throughout the day.  Go for a walk during your lunch break.  Park your car farther away from your destination.  If you take public transportation, get off one stop early and walk the rest of the way.  Make phone calls while standing up and walking around.  Take the stairs instead of elevators or escalators.  Wear comfortable clothes and shoes with good support.  Do not exercise so much that you hurt yourself, feel dizzy, or get very short of breath.  Where to find more information  [place] Department of Health and Human Services: www.hhs.gov  Centers for Disease Control and Prevention: www.cdc.gov  Contact a health care provider:  Before starting a new exercise program.  If you have questions or concerns about your weight.  If you have a medical problem that keeps you from exercising.  Get help right away if:  You have any of the following while exercising:  Injury.  Dizziness.  Difficulty breathing or shortness of breath that does not go away when you stop exercising.  Chest pain.  Rapid heartbeat.  These symptoms may represent a serious problem that is an emergency. Do not wait to see if the symptoms will go away. Get medical help right away. Call your local emergency services (911 in the [place] Do not drive yourself to the hospital.  Summary  Getting regular exercise is especially important if you are overweight.  Being overweight increases your risk of heart disease, stroke, diabetes, high blood pressure, and several types of cancer.  Losing weight happens when you burn more calories than you eat.  Reducing the amount of calories you eat, and getting regular moderate or vigorous exercise each week, helps you lose weight.  This information is not intended to replace advice given to you by your health care provider. Make sure you discuss any questions you have with your health care provider.  Document Revised:  02/13/2022 Document Reviewed: 02/13/2022  [employer] Patient Education © 2024 [employer]  MyPlate from [employer]    MyPlate is an outline of a general healthy diet based on the Dietary Guidelines for Americans, 7607-4771, from the [place] Department of Agriculture (USDA). It sets guidelines for how much food you should eat from each food group based on your age, sex, and level of physical activity.  What are tips for following MyPlate?  To follow MyPlate recommendations:  Eat a wide variety of fruits and vegetables, grains, and protein foods.  Serve smaller portions and eat less food throughout the day.  Limit portion sizes to avoid overeating.  Enjoy your food.  Get at least 150 minutes of exercise every week. This is about 30 minutes each day, 5 or more days per week.  It can be difficult to have every meal look like MyPlate. Think about MyPlate as eating guidelines for an entire day, rather than each individual meal.  Fruits and vegetables  Make one half of your plate fruits and vegetables.  Eat many different colors of fruits and vegetables each day.  For a 2,000-calorie daily food plan, eat:  2½ cups of vegetables every day.  2 cups of fruit every day.  1 cup is equal to:  1 cup raw or cooked vegetables.  1 cup raw fruit.  1 medium-sized orange, apple, or banana.  1 cup 100% fruit or vegetable juice.  2 cups raw leafy greens, such as lettuce, spinach, or kale.  ½ cup dried fruit.  Grains  One fourth of your plate should be grains.  Make at least half of the grains you eat each day whole grains.  For a 2,000-calorie daily food plan, eat 6 oz of grains every day.  1 oz is equal to:  1 slice bread.  1 cup cereal.  ½ cup cooked rice, cereal, or pasta.  Protein  One fourth of your plate should be protein.  Eat a wide variety of protein foods, including meat, poultry, fish, eggs, beans, nuts, and tofu.  For a 2,000-calorie daily food plan, eat 5½ oz of protein every day.  1 oz is equal to:  1 oz meat, poultry, or fish.  ¼  cup cooked beans.  1 egg.  ½ oz nuts or seeds.  1 Tbsp peanut butter.  Dairy  Drink fat-free or low-fat (1%) milk.  Eat or drink dairy as a side to meals.  For a 2,000-calorie daily food plan, eat or drink 3 cups of dairy every day.  1 cup is equal to:  1 cup milk, yogurt, cottage cheese, or soy milk (soy beverage).  2 oz processed cheese.  1½ oz natural cheese.  Fats, oils, salt, and sugars  Only small amounts of oils are recommended.  Avoid foods that are high in calories and low in nutritional value (empty calories), like foods high in fat or added sugars.  Choose foods that are low in salt (sodium). Choose foods that have less than 140 milligrams (mg) of sodium per serving.  Drink water instead of sugary drinks. Drink enough fluid to keep your urine pale yellow.  Where to find support  Work with your health care provider or a dietitian to develop a customized eating plan that is right for you.  Download an [employer] (mobile application) to help you track your daily food intake.  Where to find more information  USDA: ChooseMyPlate.gov  Summary  MyPlate is a general guideline for healthy eating from the USDA. It is based on the Dietary Guidelines for Americans, 1390-8458.  In general, fruits and vegetables should take up one half of your plate, grains should take up one fourth of your plate, and protein should take up one fourth of your plate.  This information is not intended to replace advice given to you by your health care provider. Make sure you discuss any questions you have with your health care provider.  Document Revised: 11/08/2021 Document Reviewed: 11/08/2021  [employer] Patient Education © 2024 [employer]

## 2025-01-28 NOTE — ASSESSMENT & PLAN NOTE
Patient's (Body mass index is 37.93 kg/m².) indicates that they are morbidly/severely obese (BMI > 40 or > 35 with obesity - related health condition) with health conditions that include hypertension, diabetes mellitus, dyslipidemias, GERD, and osteoarthritis . Weight is improving with lifestyle modifications. BMI  is above average; BMI management plan is completed. We discussed portion control, increasing exercise, and Information on healthy weight added to patient's after visit summary.

## 2025-01-28 NOTE — ASSESSMENT & PLAN NOTE
Her last DEXA scan was normal.  She has been on multiple steroid injections through pain management.  She is due for her repeat DEXA scan, order placed.  Orders:    DEXA Bone Density Axial; Future

## 2025-01-28 NOTE — ASSESSMENT & PLAN NOTE
Lipid abnormalities are stable    Plan:  Continue same medication/s without change.      Discussed medication dosage, use, side effects, and goals of treatment in detail.    Counseled patient on lifestyle modifications to help control hyperlipidemia.     Patient Treatment Goals:   LDL goal is less than 70    Followup in 6 months.    Orders:    atorvastatin (LIPITOR) 10 MG tablet; Take 1 tablet by mouth Every Night. Indications: High Amount of Fats in the Blood    Comprehensive Metabolic Panel    Lipid Panel

## 2025-01-28 NOTE — PROGRESS NOTES
Subjective   The ABCs of the Annual Wellness Visit  Medicare Wellness Visit      Jane Huang is a 75 y.o. patient who presents for a Medicare Wellness Visit.    The following portions of the patient's history were reviewed and   updated as appropriate: allergies, current medications, past family history, past medical history, past social history, past surgical history, and problem list.    Compared to one year ago, the patient's physical   health is better.  Compared to one year ago, the patient's mental   health is the same.    Recent Hospitalizations:  She was not admitted to the hospital during the last year.     Current Medical Providers:  Patient Care Team:  Chrissy Collins APRN as PCP - General (Nurse Practitioner)  Yenni Velasco APRN as Nurse Practitioner (Nurse Practitioner)    Outpatient Medications Prior to Visit   Medication Sig Dispense Refill    baclofen (LIORESAL) 10 MG tablet Take 1 tablet by mouth 3 (Three) Times a Day.      Blood Glucose Monitoring Suppl device Use as directed for blood glucose monitoring 1 each 0    Calcium Carbonate 1500 (600 Ca) MG tablet Take 1 tablet by mouth 2 (two) times a day.      empagliflozin (Jardiance) 25 MG tablet tablet Take 1 tablet by mouth Daily for 90 days. 30 tablet 0    gabapentin (NEURONTIN) 400 MG capsule TAKE 1 CAPSULE BY MOUTH THREE TIMES DAILY AS DIRECTED      glucose blood (True Metrix Blood Glucose Test) test strip TEST BLOOD GLUCOSE THREE TIMES DAILY 200 each 3    HYDROcodone-acetaminophen (NORCO)  MG per tablet Take 1 tablet by mouth Every 12 (Twelve) Hours As Needed.      Insulin Glargine (Lantus SoloStar) 100 UNIT/ML injection pen Inject 60 Units under the skin into the appropriate area as directed Daily for 90 days. 54 mL 1    Insulin Pen Needle (Pen Needles) 32G X 4 MM misc Use 1 each Daily. 100 each 5    ondansetron (Zofran) 4 MG tablet Take 1 tablet by mouth Every 8 (Eight) Hours As Needed for Nausea or Vomiting. 30  tablet 0    TRUEplus Lancets 33G misc USE TO TEST BLOOD GLUCOSE THREE TIMES DAILY 300 each 3    vitamin B-12 (CYANOCOBALAMIN) 1000 MCG tablet TAKE 1 TABLET BY MOUTH EVERY DAY 90 tablet 1    atorvastatin (LIPITOR) 10 MG tablet Take 1 tablet by mouth Every Night. Indications: High Amount of Fats in the Blood 90 tablet 1    benazepril (LOTENSIN) 20 MG tablet Take 1 tablet by mouth Daily. Indications: High Blood Pressure Disorder 90 tablet 1    cetirizine (zyrTEC) 10 MG tablet Take 1 tablet by mouth Daily.      famotidine (PEPCID) 40 MG tablet Take 1 tablet by mouth Daily. Indications: Gastroesophageal Reflux Disease 90 tablet 1    prazosin (MINIPRESS) 2 MG capsule Take 1 capsule by mouth Every Night. Indications: Frightening Dreams 90 capsule 1    Vitamin D, Cholecalciferol, 50 MCG (2000 UT) capsule Take 2 capsules by mouth Daily. Indications: Vitamin D Deficiency 180 capsule 1     No facility-administered medications prior to visit.     Opioid medication/s are on active medication list.  and I have evaluated her active treatment plan and pain score trends (see table).  Vitals:    01/28/25 1534   PainSc: 0-No pain     I have reviewed the chart for potential of high risk medication and harmful drug interactions in the elderly.        Aspirin is not on active medication list.  Aspirin use is not indicated based on review of current medical condition/s. Risk of harm outweighs potential benefits.  .    Patient Active Problem List   Diagnosis    Arthritis    Broken bones    Cataracts, bilateral    Depression    Diabetes mellitus, type 2    Diverticulitis    Essential tremor    Gall stones    GERD (gastroesophageal reflux disease)    Essential hypertension    Indeterminate colitis    Insomnia, unspecified    Kidney stones    Toe deformity    Vitamin B12 deficiency    Vitamin D deficiency    Actinic keratosis    Chronic pain disorder    Degeneration of lumbar intervertebral disc    Lumbar spondylosis    Vertigo    Unsteady  "gait    Mixed hyperlipidemia    Postmenopausal    Chronic left SI joint pain    Trochanteric bursitis of left hip    Osteoarthritis of left hip    Osteoarthritis of lumbar spine    Class 2 severe obesity with serious comorbidity and body mass index (BMI) of 37.0 to 37.9 in adult    Nightmares    Daytime sleepiness     Advance Care Planning Advance Directive is not on file.  ACP discussion was held with the patient during this visit. Patient does not have an advance directive, information provided.            Objective   Vitals:    01/28/25 1534   BP: 137/61   Pulse: 69   Resp: 18   Temp: 98.6 °F (37 °C)   TempSrc: Temporal   SpO2: 98%   Weight: 100 kg (221 lb)   Height: 162.6 cm (64\")   PainSc: 0-No pain       Estimated body mass index is 37.93 kg/m² as calculated from the following:    Height as of this encounter: 162.6 cm (64\").    Weight as of this encounter: 100 kg (221 lb).    Class 2 Severe Obesity (BMI >=35 and <=39.9). Obesity-related health conditions include the following: hypertension, diabetes mellitus, dyslipidemias, GERD, and osteoarthritis. Obesity is improving with lifestyle modifications. BMI is is above average; BMI management plan is completed. We discussed portion control, increasing exercise, and Information on healthy weight added to patient's after visit summary.         Does the patient have evidence of cognitive impairment? No  Lab Results   Component Value Date    HGBA1C 6.9 (A) 01/15/2025                                                                                                Health  Risk Assessment    Smoking Status:  Social History     Tobacco Use   Smoking Status Never   Smokeless Tobacco Never     Alcohol Consumption:  Social History     Substance and Sexual Activity   Alcohol Use Never       Fall Risk Screen  STEADI Fall Risk Assessment was completed, and patient is at LOW risk for falls.Assessment completed on:1/28/2025    Depression Screening   Little interest or pleasure in " doing things? Not at all   Feeling down, depressed, or hopeless? Not at all   PHQ-2 Total Score 0      Health Habits and Functional and Cognitive Screenin/28/2025     3:37 PM   Functional & Cognitive Status   Do you have difficulty preparing food and eating? No   Do you have difficulty bathing yourself, getting dressed or grooming yourself? No   Do you have difficulty using the toilet? No   Do you have difficulty moving around from place to place? No   Do you have trouble with steps or getting out of a bed or a chair? Yes   Current Diet Well Balanced Diet   Dental Exam Not up to date   Eye Exam Up to date   Exercise (times per week) 3 times per week   Current Exercises Include Walking;Yard Work;Other;House Cleaning;Gardening   Do you need help using the phone?  No   Are you deaf or do you have serious difficulty hearing?  Yes   Do you need help to go to places out of walking distance? No   Do you need help shopping? No   Do you need help preparing meals?  No   Do you need help with housework?  No   Do you need help with laundry? No   Do you need help taking your medications? No   Do you need help managing money? No   Do you ever drive or ride in a car without wearing a seat belt? No   Have you felt unusual stress, anger or loneliness in the last month? No   Who do you live with? Child   If you need help, do you have trouble finding someone available to you? No   Have you been bothered in the last four weeks by sexual problems? No   Do you have difficulty concentrating, remembering or making decisions? No           Age-appropriate Screening Schedule:  Refer to the list below for future screening recommendations based on patient's age, sex and/or medical conditions. Orders for these recommended tests are listed in the plan section. The patient has been provided with a written plan.    Health Maintenance List  Health Maintenance   Topic Date Due    DXA SCAN  2025    URINE MICROALBUMIN  2025     DIABETIC EYE EXAM  02/20/2025    ZOSTER VACCINE (1 of 2) 01/28/2026 (Originally 1/16/1998)    LIPID PANEL  05/24/2025    HEMOGLOBIN A1C  07/15/2025    ANNUAL WELLNESS VISIT  01/28/2026    BMI FOLLOWUP  01/28/2026    TDAP/TD VACCINES (2 - Td or Tdap) 10/26/2032    HEPATITIS C SCREENING  Completed    COVID-19 Vaccine  Completed    RSV Vaccine - Adults  Completed    INFLUENZA VACCINE  Completed    Pneumococcal Vaccine 65+  Completed    MAMMOGRAM  Discontinued    COLORECTAL CANCER SCREENING  Discontinued                                                                                                                                                CMS Preventative Services Quick Reference  Risk Factors Identified During Encounter  Immunizations Discussed/Encouraged: Shingrix  Dental Screening Recommended  Vision Screening Recommended    The above risks/problems have been discussed with the patient.  Pertinent information has been shared with the patient in the After Visit Summary.  An After Visit Summary and PPPS were made available to the patient.    Follow Up:   Next Medicare Wellness visit to be scheduled in 1 year.         Additional E&M Note during same encounter follows:  Patient has additional, significant, and separately identifiable condition(s)/problem(s) that require work above and beyond the Medicare Wellness Visit     Chief Complaint  Medicare Wellness-subsequent, Follow-up (Follow-Up on Diabetes, HTN), and Leg Pain (Patient complains of pain in both ankles, states the pain started around 2 months ago)    Subjective   HPI  Jane is also being seen today for an annual adult preventative physical exam.  and Jane is also being seen today for additional medical problem/s.    Review of Systems   Constitutional:  Negative for chills and fever.   Respiratory:  Negative for cough, shortness of breath and wheezing.    Cardiovascular:  Negative for chest pain, palpitations and leg swelling.   Gastrointestinal:   "Negative for constipation, diarrhea, nausea and vomiting.   Genitourinary:  Negative for difficulty urinating.   Musculoskeletal:  Positive for arthralgias.   Neurological:  Negative for confusion.   Psychiatric/Behavioral:  Negative for dysphoric mood and suicidal ideas. The patient is not nervous/anxious.       She is here for a follow-up.  She is accompanied by her daughter Val Michaud    She is complaining of right Leg pain, describes as a burning pain. She is planning to get an injection through pain mgmt. She is c/o bilateral ankle pain.   She has an artificial joint in left great toe, surgery was done in 1980s. It is now swelling daily. She is following with podiatry in [place].      Hyperlipidemia: She is on atorvastatin 10 mg nightly.    She has diabetes type 2, insulin-dependent.  She does follow with the diabetic clinic [facility].    Nightmares: She takes prazosin 2 mg every.    Hypertension: She had been having some dizzy spells with low blood pressure so I had her stop amlodipine.  She is on benazepril 20 mg daily.    Chronic allergies: She states she takes Zyrtec but needs a prescription sent to her pharmacy.    Body mass index is 37.93 kg/m².  She states she has been working on weight loss and has lost about 20 pounds in the past year.      Objective   Vital Signs:  /61   Pulse 69   Temp 98.6 °F (37 °C) (Temporal)   Resp 18   Ht 162.6 cm (64\")   Wt 100 kg (221 lb)   SpO2 98%   BMI 37.93 kg/m²   Physical Exam  Vitals reviewed.   Constitutional:       Appearance: Normal appearance. She is well-developed. She is obese.   HENT:      Right Ear: Tympanic membrane, ear canal and external ear normal.      Left Ear: Tympanic membrane, ear canal and external ear normal.      Mouth/Throat:      Mouth: Mucous membranes are moist.      Pharynx: No pharyngeal swelling, oropharyngeal exudate or posterior oropharyngeal erythema.   Neck:      Thyroid: No thyroid mass, thyromegaly or thyroid " tenderness.   Cardiovascular:      Rate and Rhythm: Normal rate and regular rhythm.      Heart sounds: No murmur heard.     No friction rub. No gallop.   Pulmonary:      Effort: Pulmonary effort is normal.      Breath sounds: Normal breath sounds. No wheezing or rhonchi.   Lymphadenopathy:      Cervical: No cervical adenopathy.   Skin:     General: Skin is warm and dry.   Neurological:      Mental Status: She is alert and oriented to person, place, and time.      Cranial Nerves: No cranial nerve deficit.   Psychiatric:         Mood and Affect: Mood and affect normal.         Behavior: Behavior normal.         Thought Content: Thought content normal. Thought content does not include homicidal or suicidal ideation.         Judgment: Judgment normal.         The following data was reviewed by: [facility] on 01/28/2025:  Common Labs   Common labs          8/6/2024    14:28 10/8/2024    14:38 1/15/2025    15:23   Common Labs   Hemoglobin A1C 7.2  7.2  6.9        CMP          2/21/2024    12:11 5/24/2024    15:11   CMP   Glucose  92    BUN  18    Creatinine  0.77    EGFR  80.1    Sodium  141    Potassium  4.4    Chloride  108    Calcium  8.7    Total Protein 7.0  6.9    Albumin 4.0  3.5    Globulin  3.4    Total Bilirubin 0.4  0.4    Alkaline Phosphatase 100  107    AST (SGOT) 42  35    ALT (SGPT) 33  33    Albumin/Globulin Ratio  1.0    BUN/Creatinine Ratio  23.4    Anion Gap  10.5      CBC          2/21/2024    12:11 5/24/2024    15:11   CBC   WBC 4.78  4.19    RBC 4.88  4.85    Hemoglobin 14.6  15.0    Hematocrit 44.9  45.2    MCV 92.0  93.2    MCH 29.9  30.9    MCHC 32.5  33.2    RDW 12.4  12.3    Platelets 145  139              Assessment and Plan      Medicare annual wellness visit, subsequent  Health maintenance and prevention discussed, handouts provided, see AVS.       Non-seasonal allergic rhinitis, unspecified trigger  Allergies are stable on Zyrtec 10 mg daily, will continue current  dose.  Orders:    cetirizine (zyrTEC) 10 MG tablet; Take 1 tablet by mouth Daily. Indications: Perennial Allergic Rhinitis    Mixed hyperlipidemia   Lipid abnormalities are stable    Plan:  Continue same medication/s without change.      Discussed medication dosage, use, side effects, and goals of treatment in detail.    Counseled patient on lifestyle modifications to help control hyperlipidemia.     Patient Treatment Goals:   LDL goal is less than 70    Followup in 6 months.    Orders:    atorvastatin (LIPITOR) 10 MG tablet; Take 1 tablet by mouth Every Night. Indications: High Amount of Fats in the Blood    Comprehensive Metabolic Panel    Lipid Panel    Essential hypertension  Hypertension is stable and controlled  Continue current treatment regimen.  Blood pressure will be reassessed in 6 months.    Orders:    benazepril (LOTENSIN) 20 MG tablet; Take 1 tablet by mouth Daily. Indications: High Blood Pressure    CBC Auto Differential    Comprehensive Metabolic Panel    Lipid Panel    Gastroesophageal reflux disease, unspecified whether esophagitis present  GERD is stable on Pepcid 40 mg daily, will continue current dose.  Orders:    famotidine (PEPCID) 40 MG tablet; Take 1 tablet by mouth Daily. Indications: Gastroesophageal Reflux Disease    Nightmares  Psychological condition is stable.  Continue current treatment regimen.  Psychological condition  will be reassessed in 6 months.    Orders:    prazosin (MINIPRESS) 2 MG capsule; Take 1 capsule by mouth Every Night. Indications: Frightening Dreams    Vitamin D deficiency  Currently stable, will continue vitamin D 4000 units daily.  Vitamin D level with labs today.  Orders:    Vitamin D, Cholecalciferol, 50 MCG (2000 UT) capsule; Take 2 capsules by mouth Daily. Indications: Vitamin D Deficiency    Vitamin D,25-Hydroxy    Type 2 diabetes mellitus with diabetic neuropathy, with long-term current use of insulin  Diabetes is stable.   Continue current treatment  regimen.  Reminded to get yearly retinal exam.  She is following with the diabetic clinic.  Will get urine microalbumin today.  Diabetes will be reassessed in 6 months    Orders:    Microalbumin / Creatinine Urine Ratio - Urine, Clean Catch    TSH Rfx On Abnormal To Free T4    CBC Auto Differential    Comprehensive Metabolic Panel    Lipid Panel    Class 2 severe obesity with serious comorbidity and body mass index (BMI) of 37.0 to 37.9 in adult, unspecified obesity type  Patient's (Body mass index is 37.93 kg/m².) indicates that they are morbidly/severely obese (BMI > 40 or > 35 with obesity - related health condition) with health conditions that include hypertension, diabetes mellitus, dyslipidemias, GERD, and osteoarthritis . Weight is improving with lifestyle modifications. BMI  is above average; BMI management plan is completed. We discussed portion control, increasing exercise, and Information on healthy weight added to patient's after visit summary.          Postmenopausal  Her last DEXA scan was normal.  She has been on multiple steroid injections through pain management.  She is due for her repeat DEXA scan, order placed.  Orders:    DEXA Bone Density Axial; Future    Vitamin B12 deficiency  She is taking vitamin B12 tablets over-the-counter.  I will recheck her B12 level today with her labs.  Orders:    Vitamin B12    Varicella vaccination status unknown  She does not want to get the shingles vaccine because she thinks she has never had the chickenpox.  We will check varicella antibody today with her labs.  Orders:    Varicella zoster antibody, IgG    Platelets decreased  She has had mildly low platelets in the past, will check CBC today.               Follow Up   Return in about 6 months (around 7/28/2025) for 30 min apt for complex pt.  Patient was given instructions and counseling regarding her condition or for health maintenance advice. Please see specific information pulled into the AVS if  appropriate.    Parts of this note are electronic transcriptions/translations of spoken language to printed text using the Dragon Dictation system.    Chrissy Collins, APRN    01/28/2025

## 2025-01-28 NOTE — ASSESSMENT & PLAN NOTE
Hypertension is stable and controlled  Continue current treatment regimen.  Blood pressure will be reassessed in 6 months.    Orders:    benazepril (LOTENSIN) 20 MG tablet; Take 1 tablet by mouth Daily. Indications: High Blood Pressure    CBC Auto Differential    Comprehensive Metabolic Panel    Lipid Panel

## 2025-01-28 NOTE — ASSESSMENT & PLAN NOTE
GERD is stable on Pepcid 40 mg daily, will continue current dose.  Orders:    famotidine (PEPCID) 40 MG tablet; Take 1 tablet by mouth Daily. Indications: Gastroesophageal Reflux Disease

## 2025-01-28 NOTE — ASSESSMENT & PLAN NOTE
Diabetes is stable.   Continue current treatment regimen.  Reminded to get yearly retinal exam.  She is following with the diabetic clinic.  Will get urine microalbumin today.  Diabetes will be reassessed in 6 months    Orders:    Microalbumin / Creatinine Urine Ratio - Urine, Clean Catch    TSH Rfx On Abnormal To Free T4    CBC Auto Differential    Comprehensive Metabolic Panel    Lipid Panel

## 2025-01-28 NOTE — ASSESSMENT & PLAN NOTE
She is taking vitamin B12 tablets over-the-counter.  I will recheck her B12 level today with her labs.  Orders:    Vitamin B12

## 2025-01-28 NOTE — ASSESSMENT & PLAN NOTE
Psychological condition is stable.  Continue current treatment regimen.  Psychological condition  will be reassessed in 6 months.    Orders:    prazosin (MINIPRESS) 2 MG capsule; Take 1 capsule by mouth Every Night. Indications: Frightening Dreams

## 2025-01-29 PROBLEM — N18.31 STAGE 3A CHRONIC KIDNEY DISEASE: Status: RESOLVED | Noted: 2024-05-24 | Resolved: 2025-01-29

## 2025-01-29 LAB
25(OH)D3 SERPL-MCNC: 44.4 NG/ML (ref 30–100)
ALBUMIN SERPL-MCNC: 3.7 G/DL (ref 3.5–5.2)
ALBUMIN UR-MCNC: 1.9 MG/DL
ALBUMIN/GLOB SERPL: 1 G/DL
ALP SERPL-CCNC: 115 U/L (ref 39–117)
ALT SERPL W P-5'-P-CCNC: 31 U/L (ref 1–33)
ANION GAP SERPL CALCULATED.3IONS-SCNC: 10 MMOL/L (ref 5–15)
AST SERPL-CCNC: 42 U/L (ref 1–32)
BASOPHILS # BLD AUTO: 0.03 10*3/MM3 (ref 0–0.2)
BASOPHILS NFR BLD AUTO: 0.6 % (ref 0–1.5)
BILIRUB SERPL-MCNC: 0.4 MG/DL (ref 0–1.2)
BUN SERPL-MCNC: 16 MG/DL (ref 8–23)
BUN/CREAT SERPL: 14.7 (ref 7–25)
CALCIUM SPEC-SCNC: 9.4 MG/DL (ref 8.6–10.5)
CHLORIDE SERPL-SCNC: 104 MMOL/L (ref 98–107)
CHOLEST SERPL-MCNC: 113 MG/DL (ref 0–200)
CO2 SERPL-SCNC: 26 MMOL/L (ref 22–29)
CREAT SERPL-MCNC: 1.09 MG/DL (ref 0.57–1)
CREAT UR-MCNC: 66 MG/DL
DEPRECATED RDW RBC AUTO: 43.9 FL (ref 37–54)
EGFRCR SERPLBLD CKD-EPI 2021: 52.4 ML/MIN/1.73
EOSINOPHIL # BLD AUTO: 0.07 10*3/MM3 (ref 0–0.4)
EOSINOPHIL NFR BLD AUTO: 1.5 % (ref 0.3–6.2)
ERYTHROCYTE [DISTWIDTH] IN BLOOD BY AUTOMATED COUNT: 12.6 % (ref 12.3–15.4)
GLOBULIN UR ELPH-MCNC: 3.7 GM/DL
GLUCOSE SERPL-MCNC: 123 MG/DL (ref 65–99)
HCT VFR BLD AUTO: 46.6 % (ref 34–46.6)
HDLC SERPL-MCNC: 44 MG/DL (ref 40–60)
HGB BLD-MCNC: 15.5 G/DL (ref 12–15.9)
IMM GRANULOCYTES # BLD AUTO: 0.01 10*3/MM3 (ref 0–0.05)
IMM GRANULOCYTES NFR BLD AUTO: 0.2 % (ref 0–0.5)
LDLC SERPL CALC-MCNC: 51 MG/DL (ref 0–100)
LDLC/HDLC SERPL: 1.13 {RATIO}
LYMPHOCYTES # BLD AUTO: 1.47 10*3/MM3 (ref 0.7–3.1)
LYMPHOCYTES NFR BLD AUTO: 31.7 % (ref 19.6–45.3)
MCH RBC QN AUTO: 31.4 PG (ref 26.6–33)
MCHC RBC AUTO-ENTMCNC: 33.3 G/DL (ref 31.5–35.7)
MCV RBC AUTO: 94.3 FL (ref 79–97)
MICROALBUMIN/CREAT UR: 28.8 MG/G (ref 0–29)
MONOCYTES # BLD AUTO: 0.28 10*3/MM3 (ref 0.1–0.9)
MONOCYTES NFR BLD AUTO: 6 % (ref 5–12)
NEUTROPHILS NFR BLD AUTO: 2.77 10*3/MM3 (ref 1.7–7)
NEUTROPHILS NFR BLD AUTO: 60 % (ref 42.7–76)
NRBC BLD AUTO-RTO: 0 /100 WBC (ref 0–0.2)
PLATELET # BLD AUTO: 112 10*3/MM3 (ref 140–450)
PMV BLD AUTO: 12.1 FL (ref 6–12)
POTASSIUM SERPL-SCNC: 4.3 MMOL/L (ref 3.5–5.2)
PROT SERPL-MCNC: 7.4 G/DL (ref 6–8.5)
RBC # BLD AUTO: 4.94 10*6/MM3 (ref 3.77–5.28)
SODIUM SERPL-SCNC: 140 MMOL/L (ref 136–145)
TRIGL SERPL-MCNC: 97 MG/DL (ref 0–150)
TSH SERPL DL<=0.05 MIU/L-ACNC: 1.87 UIU/ML (ref 0.27–4.2)
VIT B12 BLD-MCNC: 1183 PG/ML (ref 211–946)
VLDLC SERPL-MCNC: 18 MG/DL (ref 5–40)
WBC NRBC COR # BLD AUTO: 4.63 10*3/MM3 (ref 3.4–10.8)

## 2025-01-29 PROCEDURE — 82306 VITAMIN D 25 HYDROXY: CPT | Performed by: NURSE PRACTITIONER

## 2025-01-29 PROCEDURE — 80061 LIPID PANEL: CPT | Performed by: NURSE PRACTITIONER

## 2025-01-29 PROCEDURE — 82570 ASSAY OF URINE CREATININE: CPT | Performed by: NURSE PRACTITIONER

## 2025-01-29 PROCEDURE — 82043 UR ALBUMIN QUANTITATIVE: CPT | Performed by: NURSE PRACTITIONER

## 2025-01-29 PROCEDURE — 82607 VITAMIN B-12: CPT | Performed by: NURSE PRACTITIONER

## 2025-01-29 PROCEDURE — 84443 ASSAY THYROID STIM HORMONE: CPT | Performed by: NURSE PRACTITIONER

## 2025-01-29 PROCEDURE — 85025 COMPLETE CBC W/AUTO DIFF WBC: CPT | Performed by: NURSE PRACTITIONER

## 2025-01-29 PROCEDURE — 80053 COMPREHEN METABOLIC PANEL: CPT | Performed by: NURSE PRACTITIONER

## 2025-01-29 PROCEDURE — 86787 VARICELLA-ZOSTER ANTIBODY: CPT | Performed by: NURSE PRACTITIONER

## 2025-01-30 LAB — VZV IGG SER QL IA: REACTIVE

## 2025-02-07 ENCOUNTER — PATIENT MESSAGE (OUTPATIENT)
Dept: DIABETES SERVICES | Facility: HOSPITAL | Age: 75
End: 2025-02-07

## 2025-02-28 ENCOUNTER — HOSPITAL ENCOUNTER (OUTPATIENT)
Dept: BONE DENSITY | Facility: HOSPITAL | Age: 75
Discharge: HOME | End: 2025-02-28
Payer: MEDICARE

## 2025-02-28 DIAGNOSIS — Z78.0 POSTMENOPAUSAL: ICD-10-CM

## 2025-02-28 PROCEDURE — 77080 DXA BONE DENSITY AXIAL: CPT
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
## 2025-04-15 ENCOUNTER — OFFICE VISIT (OUTPATIENT)
Dept: DIABETES SERVICES | Facility: HOSPITAL | Age: 75
End: 2025-04-15
Payer: MEDICARE

## 2025-04-15 VITALS
DIASTOLIC BLOOD PRESSURE: 66 MMHG | SYSTOLIC BLOOD PRESSURE: 142 MMHG | BODY MASS INDEX: 37.85 KG/M2 | HEART RATE: 62 BPM | HEIGHT: 64 IN | OXYGEN SATURATION: 98 % | WEIGHT: 221.7 LBS

## 2025-04-15 DIAGNOSIS — Z79.4 CONTROLLED TYPE 2 DIABETES MELLITUS WITH HYPERGLYCEMIA, WITH LONG-TERM CURRENT USE OF INSULIN: ICD-10-CM

## 2025-04-15 DIAGNOSIS — E11.9 TYPE 2 DIABETES MELLITUS WITH HEMOGLOBIN A1C GOAL OF LESS THAN 7.0%: Primary | ICD-10-CM

## 2025-04-15 DIAGNOSIS — E11.22 TYPE 2 DIABETES MELLITUS WITH STAGE 3A CHRONIC KIDNEY DISEASE, WITH LONG-TERM CURRENT USE OF INSULIN: ICD-10-CM

## 2025-04-15 DIAGNOSIS — Z79.4 TYPE 2 DIABETES MELLITUS WITH DIABETIC NEUROPATHY, WITH LONG-TERM CURRENT USE OF INSULIN: ICD-10-CM

## 2025-04-15 DIAGNOSIS — E66.01 SEVERE OBESITY (BMI >= 40): ICD-10-CM

## 2025-04-15 DIAGNOSIS — E11.40 TYPE 2 DIABETES MELLITUS WITH DIABETIC NEUROPATHY, WITH LONG-TERM CURRENT USE OF INSULIN: ICD-10-CM

## 2025-04-15 DIAGNOSIS — E66.9 OBESITY (BMI 30-39.9): ICD-10-CM

## 2025-04-15 DIAGNOSIS — Z79.4 TYPE 2 DIABETES MELLITUS WITH STAGE 3A CHRONIC KIDNEY DISEASE, WITH LONG-TERM CURRENT USE OF INSULIN: ICD-10-CM

## 2025-04-15 DIAGNOSIS — E11.65 CONTROLLED TYPE 2 DIABETES MELLITUS WITH HYPERGLYCEMIA, WITH LONG-TERM CURRENT USE OF INSULIN: ICD-10-CM

## 2025-04-15 DIAGNOSIS — N18.31 TYPE 2 DIABETES MELLITUS WITH STAGE 3A CHRONIC KIDNEY DISEASE, WITH LONG-TERM CURRENT USE OF INSULIN: ICD-10-CM

## 2025-04-15 LAB
EXPIRATION DATE: ABNORMAL
GLUCOSE BLDC GLUCOMTR-MCNC: 134 MG/DL (ref 70–99)
HBA1C MFR BLD: 6.7 % (ref 4.5–5.7)
Lab: ABNORMAL

## 2025-04-15 PROCEDURE — 82948 REAGENT STRIP/BLOOD GLUCOSE: CPT | Performed by: NURSE PRACTITIONER

## 2025-04-15 PROCEDURE — 1160F RVW MEDS BY RX/DR IN RCRD: CPT | Performed by: NURSE PRACTITIONER

## 2025-04-15 PROCEDURE — 3078F DIAST BP <80 MM HG: CPT | Performed by: NURSE PRACTITIONER

## 2025-04-15 PROCEDURE — 1159F MED LIST DOCD IN RCRD: CPT | Performed by: NURSE PRACTITIONER

## 2025-04-15 PROCEDURE — 83036 HEMOGLOBIN GLYCOSYLATED A1C: CPT | Performed by: NURSE PRACTITIONER

## 2025-04-15 PROCEDURE — 99214 OFFICE O/P EST MOD 30 MIN: CPT | Performed by: NURSE PRACTITIONER

## 2025-04-15 PROCEDURE — G2211 COMPLEX E/M VISIT ADD ON: HCPCS | Performed by: NURSE PRACTITIONER

## 2025-04-15 PROCEDURE — G0463 HOSPITAL OUTPT CLINIC VISIT: HCPCS | Performed by: NURSE PRACTITIONER

## 2025-04-15 PROCEDURE — 3077F SYST BP >= 140 MM HG: CPT | Performed by: NURSE PRACTITIONER

## 2025-04-28 DIAGNOSIS — Z79.4 TYPE 2 DIABETES MELLITUS WITH DIABETIC NEUROPATHY, WITH LONG-TERM CURRENT USE OF INSULIN: ICD-10-CM

## 2025-04-28 DIAGNOSIS — E11.40 TYPE 2 DIABETES MELLITUS WITH DIABETIC NEUROPATHY, WITH LONG-TERM CURRENT USE OF INSULIN: ICD-10-CM

## 2025-04-28 RX ORDER — CALCIUM CITRATE/VITAMIN D3 200MG-6.25
TABLET ORAL
Qty: 300 EACH | Refills: 3 | Status: SHIPPED | OUTPATIENT
Start: 2025-04-28

## 2025-05-21 DIAGNOSIS — E11.40 TYPE 2 DIABETES MELLITUS WITH DIABETIC NEUROPATHY, WITH LONG-TERM CURRENT USE OF INSULIN: ICD-10-CM

## 2025-05-21 DIAGNOSIS — Z79.4 TYPE 2 DIABETES MELLITUS WITH DIABETIC NEUROPATHY, WITH LONG-TERM CURRENT USE OF INSULIN: ICD-10-CM

## 2025-05-22 RX ORDER — PEN NEEDLE, DIABETIC 32GX 5/32"
NEEDLE, DISPOSABLE MISCELLANEOUS DAILY
Qty: 100 EACH | Refills: 3 | Status: SHIPPED | OUTPATIENT
Start: 2025-05-22

## 2025-05-22 NOTE — TELEPHONE ENCOUNTER
Last OV 04-    Next scheduled appt 07-    Requesting refill of Insulin pen needles      Last written script 02- 100 (5) refills

## 2025-06-25 DIAGNOSIS — E78.2 MIXED HYPERLIPIDEMIA: ICD-10-CM

## 2025-06-25 RX ORDER — ATORVASTATIN CALCIUM 10 MG/1
TABLET, FILM COATED ORAL
Qty: 90 TABLET | Refills: 3 | Status: SHIPPED | OUTPATIENT
Start: 2025-06-25

## 2025-06-25 NOTE — TELEPHONE ENCOUNTER
REFILL REQUEST      atorvastatin (LIPITOR) 10 MG tablet (01/28/2025)       FOLLOW UP: 07/22/2025    LAST OFFICE VISIT:01/28/2025
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
## 2025-07-15 ENCOUNTER — OFFICE VISIT (OUTPATIENT)
Dept: DIABETES SERVICES | Facility: HOSPITAL | Age: 75
End: 2025-07-15
Payer: MEDICARE

## 2025-07-15 VITALS
DIASTOLIC BLOOD PRESSURE: 60 MMHG | HEIGHT: 64 IN | SYSTOLIC BLOOD PRESSURE: 126 MMHG | WEIGHT: 225 LBS | OXYGEN SATURATION: 97 % | HEART RATE: 67 BPM | BODY MASS INDEX: 38.41 KG/M2

## 2025-07-15 DIAGNOSIS — E11.65 CONTROLLED TYPE 2 DIABETES MELLITUS WITH HYPERGLYCEMIA, WITH LONG-TERM CURRENT USE OF INSULIN: ICD-10-CM

## 2025-07-15 DIAGNOSIS — E66.9 OBESITY (BMI 30-39.9): ICD-10-CM

## 2025-07-15 DIAGNOSIS — N18.31 TYPE 2 DIABETES MELLITUS WITH STAGE 3A CHRONIC KIDNEY DISEASE, WITH LONG-TERM CURRENT USE OF INSULIN: ICD-10-CM

## 2025-07-15 DIAGNOSIS — Z79.4 CONTROLLED TYPE 2 DIABETES MELLITUS WITH HYPERGLYCEMIA, WITH LONG-TERM CURRENT USE OF INSULIN: ICD-10-CM

## 2025-07-15 DIAGNOSIS — E11.22 TYPE 2 DIABETES MELLITUS WITH STAGE 3A CHRONIC KIDNEY DISEASE, WITH LONG-TERM CURRENT USE OF INSULIN: ICD-10-CM

## 2025-07-15 DIAGNOSIS — E11.9 TYPE 2 DIABETES MELLITUS WITH HEMOGLOBIN A1C GOAL OF LESS THAN 7.0%: ICD-10-CM

## 2025-07-15 DIAGNOSIS — E11.649 HYPOGLYCEMIA DUE TO TYPE 2 DIABETES MELLITUS: Primary | ICD-10-CM

## 2025-07-15 DIAGNOSIS — Z79.4 TYPE 2 DIABETES MELLITUS WITH STAGE 3A CHRONIC KIDNEY DISEASE, WITH LONG-TERM CURRENT USE OF INSULIN: ICD-10-CM

## 2025-07-15 DIAGNOSIS — E11.40 TYPE 2 DIABETES MELLITUS WITH DIABETIC NEUROPATHY, WITH LONG-TERM CURRENT USE OF INSULIN: ICD-10-CM

## 2025-07-15 DIAGNOSIS — Z79.4 TYPE 2 DIABETES MELLITUS WITH DIABETIC NEUROPATHY, WITH LONG-TERM CURRENT USE OF INSULIN: ICD-10-CM

## 2025-07-15 LAB
EXPIRATION DATE: ABNORMAL
GLUCOSE BLDC GLUCOMTR-MCNC: 154 MG/DL (ref 70–99)
HBA1C MFR BLD: 7 % (ref 4.5–5.7)
Lab: ABNORMAL

## 2025-07-15 PROCEDURE — 99214 OFFICE O/P EST MOD 30 MIN: CPT | Performed by: NURSE PRACTITIONER

## 2025-07-15 PROCEDURE — 1159F MED LIST DOCD IN RCRD: CPT | Performed by: NURSE PRACTITIONER

## 2025-07-15 PROCEDURE — 83036 HEMOGLOBIN GLYCOSYLATED A1C: CPT | Performed by: NURSE PRACTITIONER

## 2025-07-15 PROCEDURE — G0463 HOSPITAL OUTPT CLINIC VISIT: HCPCS | Performed by: NURSE PRACTITIONER

## 2025-07-15 PROCEDURE — 3074F SYST BP LT 130 MM HG: CPT | Performed by: NURSE PRACTITIONER

## 2025-07-15 PROCEDURE — 82948 REAGENT STRIP/BLOOD GLUCOSE: CPT | Performed by: NURSE PRACTITIONER

## 2025-07-15 PROCEDURE — 1160F RVW MEDS BY RX/DR IN RCRD: CPT | Performed by: NURSE PRACTITIONER

## 2025-07-15 PROCEDURE — 3078F DIAST BP <80 MM HG: CPT | Performed by: NURSE PRACTITIONER

## 2025-07-15 PROCEDURE — G2211 COMPLEX E/M VISIT ADD ON: HCPCS | Performed by: NURSE PRACTITIONER

## 2025-07-15 RX ORDER — INSULIN GLARGINE 100 [IU]/ML
60 INJECTION, SOLUTION SUBCUTANEOUS DAILY
Qty: 54 ML | Refills: 1 | Status: SHIPPED | OUTPATIENT
Start: 2025-07-15 | End: 2025-10-13

## 2025-07-15 RX ORDER — AVOBENZONE, HOMOSALATE, OCTISALATE, OCTOCRYLENE 30; 40; 45; 26 MG/ML; MG/ML; MG/ML; MG/ML
CREAM TOPICAL
Qty: 300 EACH | Refills: 5 | Status: SHIPPED | OUTPATIENT
Start: 2025-07-15

## 2025-07-22 ENCOUNTER — OFFICE VISIT (OUTPATIENT)
Dept: FAMILY MEDICINE CLINIC | Facility: CLINIC | Age: 75
End: 2025-07-22
Payer: MEDICARE

## 2025-07-22 VITALS
TEMPERATURE: 96.8 F | SYSTOLIC BLOOD PRESSURE: 139 MMHG | OXYGEN SATURATION: 95 % | HEART RATE: 77 BPM | BODY MASS INDEX: 38.33 KG/M2 | WEIGHT: 223.3 LBS | DIASTOLIC BLOOD PRESSURE: 76 MMHG

## 2025-07-22 DIAGNOSIS — R25.2 MUSCLE CRAMPS: ICD-10-CM

## 2025-07-22 DIAGNOSIS — Z79.4 TYPE 2 DIABETES MELLITUS WITH DIABETIC NEUROPATHY, WITH LONG-TERM CURRENT USE OF INSULIN: ICD-10-CM

## 2025-07-22 DIAGNOSIS — F51.5 NIGHTMARES: ICD-10-CM

## 2025-07-22 DIAGNOSIS — I10 ESSENTIAL HYPERTENSION: Primary | ICD-10-CM

## 2025-07-22 DIAGNOSIS — M20.62 TOE DEFORMITY, LEFT: ICD-10-CM

## 2025-07-22 DIAGNOSIS — K21.9 GASTROESOPHAGEAL REFLUX DISEASE, UNSPECIFIED WHETHER ESOPHAGITIS PRESENT: ICD-10-CM

## 2025-07-22 DIAGNOSIS — J30.89 NON-SEASONAL ALLERGIC RHINITIS, UNSPECIFIED TRIGGER: ICD-10-CM

## 2025-07-22 DIAGNOSIS — E11.40 TYPE 2 DIABETES MELLITUS WITH DIABETIC NEUROPATHY, WITH LONG-TERM CURRENT USE OF INSULIN: ICD-10-CM

## 2025-07-22 DIAGNOSIS — E55.9 VITAMIN D DEFICIENCY: ICD-10-CM

## 2025-07-22 PROBLEM — Z79.899 OTHER LONG TERM (CURRENT) DRUG THERAPY: Status: ACTIVE | Noted: 2019-03-12

## 2025-07-22 LAB
25(OH)D3 SERPL-MCNC: 37.7 NG/ML (ref 30–100)
BASOPHILS # BLD AUTO: 0.02 10*3/MM3 (ref 0–0.2)
BASOPHILS NFR BLD AUTO: 0.5 % (ref 0–1.5)
DEPRECATED RDW RBC AUTO: 49.5 FL (ref 37–54)
EOSINOPHIL # BLD AUTO: 0.05 10*3/MM3 (ref 0–0.4)
EOSINOPHIL NFR BLD AUTO: 1.3 % (ref 0.3–6.2)
ERYTHROCYTE [DISTWIDTH] IN BLOOD BY AUTOMATED COUNT: 13.5 % (ref 12.3–15.4)
HCT VFR BLD AUTO: 45.5 % (ref 34–46.6)
HGB BLD-MCNC: 14.6 G/DL (ref 12–15.9)
IMM GRANULOCYTES # BLD AUTO: 0.01 10*3/MM3 (ref 0–0.05)
IMM GRANULOCYTES NFR BLD AUTO: 0.3 % (ref 0–0.5)
LYMPHOCYTES # BLD AUTO: 1.15 10*3/MM3 (ref 0.7–3.1)
LYMPHOCYTES NFR BLD AUTO: 28.8 % (ref 19.6–45.3)
MCH RBC QN AUTO: 32.2 PG (ref 26.6–33)
MCHC RBC AUTO-ENTMCNC: 32.1 G/DL (ref 31.5–35.7)
MCV RBC AUTO: 100.2 FL (ref 79–97)
MONOCYTES # BLD AUTO: 0.24 10*3/MM3 (ref 0.1–0.9)
MONOCYTES NFR BLD AUTO: 6 % (ref 5–12)
NEUTROPHILS NFR BLD AUTO: 2.53 10*3/MM3 (ref 1.7–7)
NEUTROPHILS NFR BLD AUTO: 63.1 % (ref 42.7–76)
NRBC BLD AUTO-RTO: 0 /100 WBC (ref 0–0.2)
PLATELET # BLD AUTO: 103 10*3/MM3 (ref 140–450)
PMV BLD AUTO: 12.7 FL (ref 6–12)
RBC # BLD AUTO: 4.54 10*6/MM3 (ref 3.77–5.28)
WBC NRBC COR # BLD AUTO: 4 10*3/MM3 (ref 3.4–10.8)

## 2025-07-22 PROCEDURE — 36415 COLL VENOUS BLD VENIPUNCTURE: CPT | Performed by: NURSE PRACTITIONER

## 2025-07-22 PROCEDURE — 1160F RVW MEDS BY RX/DR IN RCRD: CPT | Performed by: NURSE PRACTITIONER

## 2025-07-22 PROCEDURE — 3078F DIAST BP <80 MM HG: CPT | Performed by: NURSE PRACTITIONER

## 2025-07-22 PROCEDURE — 80053 COMPREHEN METABOLIC PANEL: CPT | Performed by: NURSE PRACTITIONER

## 2025-07-22 PROCEDURE — 99214 OFFICE O/P EST MOD 30 MIN: CPT | Performed by: NURSE PRACTITIONER

## 2025-07-22 PROCEDURE — 85025 COMPLETE CBC W/AUTO DIFF WBC: CPT | Performed by: NURSE PRACTITIONER

## 2025-07-22 PROCEDURE — 3075F SYST BP GE 130 - 139MM HG: CPT | Performed by: NURSE PRACTITIONER

## 2025-07-22 PROCEDURE — 1126F AMNT PAIN NOTED NONE PRSNT: CPT | Performed by: NURSE PRACTITIONER

## 2025-07-22 PROCEDURE — 83735 ASSAY OF MAGNESIUM: CPT | Performed by: NURSE PRACTITIONER

## 2025-07-22 PROCEDURE — 1159F MED LIST DOCD IN RCRD: CPT | Performed by: NURSE PRACTITIONER

## 2025-07-22 PROCEDURE — 82306 VITAMIN D 25 HYDROXY: CPT | Performed by: NURSE PRACTITIONER

## 2025-07-22 PROCEDURE — 80061 LIPID PANEL: CPT | Performed by: NURSE PRACTITIONER

## 2025-07-22 RX ORDER — BENAZEPRIL HYDROCHLORIDE 20 MG/1
20 TABLET ORAL DAILY
Qty: 90 TABLET | Refills: 1 | Status: SHIPPED | OUTPATIENT
Start: 2025-07-22

## 2025-07-22 RX ORDER — CETIRIZINE HYDROCHLORIDE 10 MG/1
10 TABLET ORAL DAILY
Qty: 90 TABLET | Refills: 3 | Status: CANCELLED | OUTPATIENT
Start: 2025-07-22

## 2025-07-22 RX ORDER — LATANOPROST 50 UG/ML
SOLUTION/ DROPS OPHTHALMIC
COMMUNITY
Start: 2025-06-07

## 2025-07-22 RX ORDER — MULTIVIT-MIN/IRON/FOLIC ACID/K 18-600-40
4000 CAPSULE ORAL DAILY
Qty: 180 CAPSULE | Refills: 1 | Status: CANCELLED | OUTPATIENT
Start: 2025-07-22

## 2025-07-22 RX ORDER — LANOLIN ALCOHOL/MO/W.PET/CERES
1000 CREAM (GRAM) TOPICAL DAILY
Qty: 90 TABLET | Refills: 1 | Status: CANCELLED | OUTPATIENT
Start: 2025-07-22

## 2025-07-22 RX ORDER — PRAZOSIN HYDROCHLORIDE 2 MG/1
2 CAPSULE ORAL NIGHTLY
Qty: 90 CAPSULE | Refills: 1 | Status: SHIPPED | OUTPATIENT
Start: 2025-07-22

## 2025-07-22 RX ORDER — LANOLIN ALCOHOL/MO/W.PET/CERES
1000 CREAM (GRAM) TOPICAL DAILY
Qty: 90 TABLET | Refills: 1 | Status: SHIPPED | OUTPATIENT
Start: 2025-07-22

## 2025-07-22 RX ORDER — MULTIVIT-MIN/IRON/FOLIC ACID/K 18-600-40
4000 CAPSULE ORAL DAILY
Qty: 180 CAPSULE | Refills: 1 | Status: SHIPPED | OUTPATIENT
Start: 2025-07-22

## 2025-07-22 RX ORDER — MAGNESIUM OXIDE 400 MG/1
400 TABLET ORAL DAILY
Qty: 90 TABLET | Refills: 1 | Status: SHIPPED | OUTPATIENT
Start: 2025-07-22

## 2025-07-22 RX ORDER — FAMOTIDINE 40 MG/1
40 TABLET, FILM COATED ORAL DAILY
Qty: 90 TABLET | Refills: 1 | Status: SHIPPED | OUTPATIENT
Start: 2025-07-22

## 2025-07-22 NOTE — PROGRESS NOTES
Chief Complaint  Hypertension and Diabetes    Patient or patient representative verbalized consent for the use of Ambient Listening during the visit with  NHUNG Ramos for chart documentation. 7/22/2025  11:21 EDT    Subjective            Jane Huang is a 75 y.o. female who presents to [facility]   Primary Care Follow-Up  Conditions present: anxiety, depression, diabetes, hypertension, hyperlipidemia, GERD and other    Current symptoms: insomnia, shortness of breath, weight gain, peripheral edema and myalgias      Current symptoms: no chest pain, no dizziness, no dry mouth, no palpitations, no weight loss, no blurred vision, no foot ulcerations and no headaches    Sleep quality:  Poor  Sleep per night:  6 hours  Treatment compliance:  All of the time  Treatment barriers:  No complaince problems  Exercise:  Three times a week  Anxiety:     Additional anxiety information:  Stable  Depression:     Additional depression information:  Same no change  Diabetes:     Current treatments:  Diet, oral medications and insulin injections    Dose schedule:  Pre-breakfast    Given by:  Patient    Injection sites:  Abdominal wall    Home blood tests:  1-2 x day    Highest range:  180-200    Below 70:  Occasionally    Meal planning:  Carbohydrate counting    Eye exam current: yes      Sees podiatrist: no    Hypertension:     Additional hypertension information:  No issues  Hyperlipidemia:     Additional hyperlipidemia information:  None  GERD:     Additional GERD information:  No issues  Other:     Additional other condition information:  Staying tired feeling weak. Blood sugar goes up and down at then.    History of Present Illness  The patient is here today for her 6-month follow-up. She is accompanied by her daughter.    She reports feeling weaker than usual, with her blood sugar levels being low, such as 85 this morning. She is unsure if this weakness is related to her blood  sugar levels. She is currently on Lantus 60 units and Jardiance for diabetes management. Her A1c level was 7 during her last visit to Y[facility] Velasco, which is a slight increase from the previous reading of 6.7. She has an upcoming eye exam scheduled in August or January and typically has these exams every 3 to 6 months. She has not seen a podiatrist recently.    She mentions that her blood pressure reading of 139/76 is higher than her usual range, which she attributes to forgetting her medication yesterday. She is currently taking benazepril 20 mg daily for blood pressure management.    She experiences muscle spasms, similar to charley horses, intermittently. She has tried eating bananas to alleviate the symptoms, but this has not been effective. She does not take magnesium supplements. She also reports constipation but does not have diarrhea.    She is currently taking Zyrtec daily for allergies, but it is not providing sufficient relief. She has previously tried Xyzal, which was effective.    She is taking famotidine 40 mg daily for acid reflux, which is well-managed.    She is taking prazosin 2 mg nightly for nightmares, which is effective.    She is taking vitamin D 2000 units daily for vitamin D deficiency, which is well-managed.    She is taking B12 tablets 1000 mcg daily, which is well-managed.    She reports no swelling. She has a hammertoe deformity on her left foot and an artificial joint in her left first metatarsal digit. She has not seen a podiatrist recently.    Occupations: Works one day a week    PAST SURGICAL HISTORY:  Artificial joint in the left first metatarsal digit  Hammertoe surgery on the left foot        Tobacco Use: Low Risk  (7/22/2025)    Patient History     Smoking Tobacco Use: Never     Smokeless Tobacco Use: Never     Passive Exposure: Not on file      E-cigarette/Vaping    E-cigarette/Vaping Use Never User      E-cigarette/Vaping Substances     E-cigarette/Vaping Devices       Alcohol  Use: Not At Risk (9/7/2021)    AUDIT-C     Frequency of Alcohol Consumption: Never     Average Number of Drinks: Not on file     Frequency of Binge Drinking: Not on file         Objective   Vital Signs:   Vitals:    07/22/25 1106   BP: 139/76   Pulse: 77   Temp: 96.8 °F (36 °C)   SpO2: 95%   Weight: 101 kg (223 lb 4.8 oz)     Body mass index is 38.33 kg/m².    Wt Readings from Last 3 Encounters:   07/22/25 101 kg (223 lb 4.8 oz)   07/15/25 102 kg (225 lb)   04/15/25 101 kg (221 lb 11.2 oz)     BP Readings from Last 3 Encounters:   07/22/25 139/76   07/15/25 126/60   04/15/25 142/66       Health Maintenance   Topic Date Due    DIABETIC FOOT EXAM  08/09/2024    DIABETIC EYE EXAM  02/20/2025    COVID-19 Vaccine (7 - 2024-25 season) 04/22/2025    ZOSTER VACCINE (1 of 2) 01/28/2026 (Originally 1/16/1998)    INFLUENZA VACCINE  10/01/2025    HEMOGLOBIN A1C  01/15/2026    ANNUAL WELLNESS VISIT  01/28/2026    LIPID PANEL  01/29/2026    URINE MICROALBUMIN-CREATININE RATIO (uACR)  01/29/2026    DXA SCAN  02/28/2027    TDAP/TD VACCINES (2 - Td or Tdap) 10/26/2032    HEPATITIS C SCREENING  Completed    RSV Vaccine - Adults  Completed    Pneumococcal Vaccine 50+  Completed    MAMMOGRAM  Discontinued    COLORECTAL CANCER SCREENING  Discontinued       /76   Pulse 77   Temp 96.8 °F (36 °C)   Wt 101 kg (223 lb 4.8 oz)   SpO2 95%   BMI 38.33 kg/m²       Current Outpatient Medications:     atorvastatin (LIPITOR) 10 MG tablet, TAKE 1 TABLET EVERY NIGHT FOR HIGH AMOUNT OF FATS IN THE BLOOD, Disp: 90 tablet, Rfl: 3    baclofen (LIORESAL) 10 MG tablet, Take 1 tablet by mouth 3 (Three) Times a Day. (Patient taking differently: Take 1 tablet by mouth 2 (Two) Times a Day.), Disp: , Rfl:     benazepril (LOTENSIN) 20 MG tablet, Take 1 tablet by mouth Daily. Indications: High Blood Pressure, Disp: 90 tablet, Rfl: 1    Blood Glucose Monitoring Suppl device, Use as directed for blood glucose monitoring, Disp: 1 each, Rfl: 0    Blood  Glucose Monitoring Suppl device, Use as directed for blood glucose monitoring, Disp: 1 each, Rfl: 0    Calcium Carbonate 1500 (600 Ca) MG tablet, Take 1 tablet by mouth 2 (two) times a day., Disp: , Rfl:     cetirizine (zyrTEC) 10 MG tablet, Take 1 tablet by mouth Daily. Indications: Perennial Allergic Rhinitis, Disp: 90 tablet, Rfl: 3    Droplet Pen Needles 32G X 4 MM misc, USE DAILY., Disp: 100 each, Rfl: 3    famotidine (PEPCID) 40 MG tablet, Take 1 tablet by mouth Daily. Indications: Gastroesophageal Reflux Disease, Disp: 90 tablet, Rfl: 1    gabapentin (NEURONTIN) 400 MG capsule, TAKE 1 CAPSULE BY MOUTH THREE TIMES DAILY AS DIRECTED, Disp: , Rfl:     glucose blood (True Metrix Blood Glucose Test) test strip, TEST BLOOD SUGAR THREE TIMES DAILY, Disp: 300 each, Rfl: 3    glucose blood test strip, Test blood glucose 3 times each day, Disp: 300 each, Rfl: 5    HYDROcodone-acetaminophen (NORCO)  MG per tablet, Take 1 tablet by mouth Every 12 (Twelve) Hours As Needed., Disp: , Rfl:     Insulin Glargine (Lantus SoloStar) 100 UNIT/ML injection pen, Inject 60 Units under the skin into the appropriate area as directed Daily for 90 days., Disp: 54 mL, Rfl: 1    Lancets misc, Test blood glucose 3 times each day, Disp: 300 each, Rfl: 5    latanoprost (XALATAN) 0.005 % ophthalmic solution, instill 1 drop in both eyes at bedtime, Disp: , Rfl:     ondansetron (Zofran) 4 MG tablet, Take 1 tablet by mouth Every 8 (Eight) Hours As Needed for Nausea or Vomiting., Disp: 30 tablet, Rfl: 0    prazosin (MINIPRESS) 2 MG capsule, Take 1 capsule by mouth Every Night. Indications: Frightening Dreams, Disp: 90 capsule, Rfl: 1    TRUEplus Lancets 33G misc, USE TO TEST BLOOD GLUCOSE THREE TIMES DAILY, Disp: 300 each, Rfl: 3    vitamin B-12 (CYANOCOBALAMIN) 1000 MCG tablet, Take 1 tablet by mouth Daily., Disp: 90 tablet, Rfl: 1    Vitamin D, Cholecalciferol, 50 MCG (2000 UT) capsule, Take 2 capsules by mouth Daily. Indications: Vitamin  D Deficiency, Disp: 180 capsule, Rfl: 1    magnesium oxide (MAG-OX) 400 MG tablet, Take 1 tablet by mouth Daily., Disp: 90 tablet, Rfl: 1   Past Medical History:   Diagnosis Date    Acid reflux     Allergic     Pcn    Allergies     Ankle sprain     Arthritis     Arthritis of back     Years    Broken bones     Bursitis of hip 2022    Cataracts, bilateral     Chronic allergic rhinitis     Claustrophobia     Colitis     CTS (carpal tunnel syndrome)     Right    Depression 09/09/2020    Diabetes     Diabetes mellitus, type 2 06/11/2019    Diverticulitis     Encounter for screening breast examination     Essential hypertension 06/11/2019    Essential tremor 06/11/2019    Forgetfulness     Gall stones     GERD (gastroesophageal reflux disease) 06/02/2021    Glaucoma     Latanoptost at night    High blood pressure     Hip arthrosis     Years    HTN (hypertension), benign     Hypoglycemia     Increased low episodes recently    Insomnia, unspecified 09/09/2020    Kidney stones     Knee swelling     Right worae than left    Low back pain     Low back strain     Lumbosacral disc disease     Mixed hyperlipidemia 09/15/2022    Rotator cuff syndrome     Left shoulder repaiws 2001    Screening for colon cancer 03/18/2013    Sinus trouble     Toe deformity 03/02/2021    Tremor     Vitamin B12 deficiency 09/09/2020    Vitamin D deficiency 12/09/2020        Physical Exam  Vitals reviewed.   Constitutional:       Appearance: Normal appearance. She is well-developed. She is obese.   Neck:      Thyroid: No thyroid mass, thyromegaly or thyroid tenderness.   Cardiovascular:      Rate and Rhythm: Normal rate and regular rhythm.      Pulses:           Dorsalis pedis pulses are 2+ on the right side and 2+ on the left side.      Heart sounds: No murmur heard.     No friction rub. No gallop.   Pulmonary:      Effort: Pulmonary effort is normal.      Breath sounds: Normal breath sounds. No wheezing or rhonchi.   Musculoskeletal:      Left foot:  Deformity present.   Feet:      Right foot:      Protective Sensation: 9 sites tested.  8 sites sensed.      Skin integrity: Callus and dry skin present. No ulcer, blister or skin breakdown.      Toenail Condition: Right toenails are abnormally thick, long and ingrown.      Left foot:      Protective Sensation: 9 sites tested.  9 sites sensed.      Skin integrity: Callus and dry skin present. No ulcer, blister or skin breakdown.      Toenail Condition: Left toenails are abnormally thick and long.      Comments: Diabetic Foot Exam Performed and Monofilament Test Performed     Lymphadenopathy:      Cervical: No cervical adenopathy.   Skin:     General: Skin is warm and dry.   Neurological:      Mental Status: She is alert and oriented to person, place, and time.      Cranial Nerves: No cranial nerve deficit.   Psychiatric:         Mood and Affect: Mood and affect normal.         Behavior: Behavior normal.         Thought Content: Thought content normal. Thought content does not include homicidal or suicidal ideation.         Judgment: Judgment normal.                      Physical Exam  Respiratory: Clear to auscultation, no wheezing, rales or rhonchi  Cardiovascular: Regular rate and rhythm, no murmurs, rubs, or gallops  Other: There is a little red spot on the right fourth toe where the shoe must be rubbing. Hammertoe deformities are present on the left foot. An artificial joint is noted in the left first digit. Calluses and dry skin are observed on the feet. Pulses in the feet are good.      Result Review :    The following data was reviewed by: NHUNG Ramos on 07/22/2025:           No Images in the past 120 days found..    Results  Labs   - A1c: 6.7%     Assessment & Plan  Essential hypertension      Orders:    benazepril (LOTENSIN) 20 MG tablet; Take 1 tablet by mouth Daily. Indications: High Blood Pressure    CBC Auto Differential    Comprehensive Metabolic Panel    Lipid Panel    Non-seasonal  allergic rhinitis, unspecified trigger    Gastroesophageal reflux disease, unspecified whether esophagitis present    Orders:    famotidine (PEPCID) 40 MG tablet; Take 1 tablet by mouth Daily. Indications: Gastroesophageal Reflux Disease    Nightmares    Orders:    prazosin (MINIPRESS) 2 MG capsule; Take 1 capsule by mouth Every Night. Indications: Frightening Dreams    Vitamin D deficiency    Orders:    Vitamin D, Cholecalciferol, 50 MCG (2000 UT) capsule; Take 2 capsules by mouth Daily. Indications: Vitamin D Deficiency    Vitamin D,25-Hydroxy    Muscle cramps    Orders:    Magnesium    magnesium oxide (MAG-OX) 400 MG tablet; Take 1 tablet by mouth Daily.    Type 2 diabetes mellitus with diabetic neuropathy, with long-term current use of insulin      Orders:    Ambulatory Referral to Podiatry    Toe deformity, left    Orders:    Ambulatory Referral to Podiatry       Assessment & Plan  1. Diabetes Mellitus.  - Blood glucose levels have remained stable, with a recent A1c of 7.0%.  - Currently on Lantus 60 units and Jardiance. Reports feeling weak, with fasting sugars as low as 85 mg/dL.  - Advised to monitor blood glucose levels at home and consult with diabetes specialist, Yuliya Velasco, regarding potential adjustments to insulin regimen to avoid hypoglycemia.  - Diabetic foot exam performed today, revealing an ingrown toenail on the right fourth toe and hammertoe deformities on the left foot. Referral to podiatry will be made for further evaluation and management of foot conditions.    2. Hypertension.  - Blood pressure reading today was 139/76 mmHg, slightly elevated.  - Missed medication dose yesterday. Currently on benazepril 20 mg once a day.  - Advised to continue monitoring blood pressure at home and ensure regular medication intake.    3. Allergic Rhinitis.  - Currently taking cetirizine (Zyrtec) daily.  - Given age, a lower dose of 5 mg is recommended, but patient states the zyrtec 10 mg is what works for  her without side effects.      4. Gastroesophageal Reflux Disease (GERD).  - Taking famotidine 40 mg daily.  - Symptoms are effectively managed.    5. Nightmares.  - On prazosin 2 mg every night.  - Symptoms are effectively managed.    6. Vitamin D Deficiency.  - Taking vitamin D 2000 units daily.  - Prescription for vitamin D will be sent to pharmacy.    7. Vitamin B12 Deficiency.  - Taking B12 tablets 1000 mcg daily.  - Prescription for B12 will be sent to pharmacy.    8. Muscle Cramps.  - Experiences muscle cramps and spasms, particularly in legs.  - Advised to perform stretching exercises during the day and before bedtime.  - Magnesium supplementation once a day is recommended to help alleviate cramps.  - If symptoms persist, tonic water or pickle juice can be considered as additional remedies.    Follow-up: The patient will follow up in 6 months for her Medicare wellness visit.       Diagnosis Plan   1. Essential hypertension  benazepril (LOTENSIN) 20 MG tablet    CBC Auto Differential    Comprehensive Metabolic Panel    Lipid Panel      2. Non-seasonal allergic rhinitis, unspecified trigger        3. Gastroesophageal reflux disease, unspecified whether esophagitis present  famotidine (PEPCID) 40 MG tablet      4. Nightmares  prazosin (MINIPRESS) 2 MG capsule      5. Vitamin D deficiency  Vitamin D, Cholecalciferol, 50 MCG (2000 UT) capsule    Vitamin D,25-Hydroxy      6. Muscle cramps  Magnesium    magnesium oxide (MAG-OX) 400 MG tablet      7. Type 2 diabetes mellitus with diabetic neuropathy, with long-term current use of insulin  Ambulatory Referral to Podiatry      8. Toe deformity, left  Ambulatory Referral to Podiatry            FOLLOW UP  Return in about 6 months (around 1/22/2026) for Annual Wellness Visit (Medicare), 30 min apt for complex pt.  Patient was given instructions and counseling regarding her condition or for health maintenance advice. Please see specific information pulled into the AVS if  appropriate.       CURRENT & DISCONTINUED MEDICATIONS  Current Outpatient Medications   Medication Instructions    atorvastatin (LIPITOR) 10 MG tablet TAKE 1 TABLET EVERY NIGHT FOR HIGH AMOUNT OF FATS IN THE BLOOD    baclofen (LIORESAL) 10 mg, 3 Times Daily    benazepril (LOTENSIN) 20 mg, Oral, Daily    Blood Glucose Monitoring Suppl device Use as directed for blood glucose monitoring    Blood Glucose Monitoring Suppl device Use as directed for blood glucose monitoring    Calcium Carbonate 1500 (600 Ca) MG tablet 1 tablet, 2 times daily    cetirizine (ZYRTEC) 10 mg, Oral, Daily    Droplet Pen Needles 32G X 4 MM misc Daily    famotidine (PEPCID) 40 mg, Oral, Daily    gabapentin (NEURONTIN) 400 MG capsule TAKE 1 CAPSULE BY MOUTH THREE TIMES DAILY AS DIRECTED    glucose blood (True Metrix Blood Glucose Test) test strip TEST BLOOD SUGAR THREE TIMES DAILY    glucose blood test strip Test blood glucose 3 times each day    HYDROcodone-acetaminophen (NORCO)  MG per tablet 1 tablet, Every 12 Hours PRN    Lancets misc Test blood glucose 3 times each day    Lantus SoloStar 60 Units, Subcutaneous, Daily    latanoprost (XALATAN) 0.005 % ophthalmic solution instill 1 drop in both eyes at bedtime    magnesium oxide (MAG-OX) 400 mg, Oral, Daily    ondansetron (ZOFRAN) 4 mg, Oral, Every 8 Hours PRN    prazosin (MINIPRESS) 2 mg, Oral, Nightly    TRUEplus Lancets 33G misc USE TO TEST BLOOD GLUCOSE THREE TIMES DAILY    vitamin B-12 (CYANOCOBALAMIN) 1,000 mcg, Oral, Daily    Vitamin D (Cholecalciferol) 100 mcg, Oral, Daily       Medications Discontinued During This Encounter   Medication Reason    vitamin B-12 (CYANOCOBALAMIN) 1000 MCG tablet Reorder    benazepril (LOTENSIN) 20 MG tablet Reorder    famotidine (PEPCID) 40 MG tablet Reorder    prazosin (MINIPRESS) 2 MG capsule Reorder    Vitamin D, Cholecalciferol, 50 MCG (2000 UT) capsule Reorder        Parts of this note are electronic transcriptions/translations of spoken  language to printed text using the Dragon Dictation system.    Chrissy Collins, NHUNG  07/22/25  11:49 EDT

## 2025-07-22 NOTE — ASSESSMENT & PLAN NOTE
Orders:    Vitamin D, Cholecalciferol, 50 MCG (2000 UT) capsule; Take 2 capsules by mouth Daily. Indications: Vitamin D Deficiency    Vitamin D,25-Hydroxy

## 2025-07-22 NOTE — ASSESSMENT & PLAN NOTE
Orders:    famotidine (PEPCID) 40 MG tablet; Take 1 tablet by mouth Daily. Indications: Gastroesophageal Reflux Disease

## 2025-07-22 NOTE — ASSESSMENT & PLAN NOTE
{Hypertension is (optional):[id number]}    Orders:    benazepril (LOTENSIN) 20 MG tablet; Take 1 tablet by mouth Daily. Indications: High Blood Pressure    CBC Auto Differential    Comprehensive Metabolic Panel    Lipid Panel

## 2025-07-22 NOTE — ASSESSMENT & PLAN NOTE
Orders:    prazosin (MINIPRESS) 2 MG capsule; Take 1 capsule by mouth Every Night. Indications: Frightening Dreams

## 2025-07-23 ENCOUNTER — RESULTS FOLLOW-UP (OUTPATIENT)
Dept: FAMILY MEDICINE CLINIC | Facility: CLINIC | Age: 75
End: 2025-07-23
Payer: MEDICARE

## 2025-07-23 LAB
ALBUMIN SERPL-MCNC: 3.6 G/DL (ref 3.5–5.2)
ALBUMIN/GLOB SERPL: 1.1 G/DL
ALP SERPL-CCNC: 91 U/L (ref 39–117)
ALT SERPL W P-5'-P-CCNC: 29 U/L (ref 1–33)
ANION GAP SERPL CALCULATED.3IONS-SCNC: 10.9 MMOL/L (ref 5–15)
AST SERPL-CCNC: 38 U/L (ref 1–32)
BILIRUB SERPL-MCNC: 0.5 MG/DL (ref 0–1.2)
BUN SERPL-MCNC: 19 MG/DL (ref 8–23)
BUN/CREAT SERPL: 17.3 (ref 7–25)
CALCIUM SPEC-SCNC: 9.2 MG/DL (ref 8.6–10.5)
CHLORIDE SERPL-SCNC: 107 MMOL/L (ref 98–107)
CHOLEST SERPL-MCNC: 107 MG/DL (ref 0–200)
CO2 SERPL-SCNC: 24.1 MMOL/L (ref 22–29)
CREAT SERPL-MCNC: 1.1 MG/DL (ref 0.57–1)
EGFRCR SERPLBLD CKD-EPI 2021: 51.9 ML/MIN/1.73
GLOBULIN UR ELPH-MCNC: 3.4 GM/DL
GLUCOSE SERPL-MCNC: 165 MG/DL (ref 65–99)
HDLC SERPL-MCNC: 40 MG/DL (ref 40–60)
LDLC SERPL CALC-MCNC: 41 MG/DL (ref 0–100)
LDLC/HDLC SERPL: 0.91 {RATIO}
MAGNESIUM SERPL-MCNC: 1.9 MG/DL (ref 1.6–2.4)
POTASSIUM SERPL-SCNC: 4.4 MMOL/L (ref 3.5–5.2)
PROT SERPL-MCNC: 7 G/DL (ref 6–8.5)
SODIUM SERPL-SCNC: 142 MMOL/L (ref 136–145)
TRIGL SERPL-MCNC: 153 MG/DL (ref 0–150)
VLDLC SERPL-MCNC: 26 MG/DL (ref 5–40)

## 2025-08-03 DIAGNOSIS — I10 ESSENTIAL HYPERTENSION: ICD-10-CM

## 2025-08-03 DIAGNOSIS — K21.9 GASTROESOPHAGEAL REFLUX DISEASE, UNSPECIFIED WHETHER ESOPHAGITIS PRESENT: ICD-10-CM

## 2025-08-04 RX ORDER — FAMOTIDINE 40 MG/1
40 TABLET, FILM COATED ORAL DAILY
Qty: 90 TABLET | Refills: 3 | OUTPATIENT
Start: 2025-08-04

## 2025-08-04 RX ORDER — BENAZEPRIL HYDROCHLORIDE 20 MG/1
20 TABLET ORAL DAILY
Qty: 90 TABLET | Refills: 3 | OUTPATIENT
Start: 2025-08-04